# Patient Record
Sex: MALE | Race: WHITE | NOT HISPANIC OR LATINO | Employment: OTHER | URBAN - METROPOLITAN AREA
[De-identification: names, ages, dates, MRNs, and addresses within clinical notes are randomized per-mention and may not be internally consistent; named-entity substitution may affect disease eponyms.]

---

## 2024-08-17 ENCOUNTER — APPOINTMENT (OUTPATIENT)
Dept: RADIOLOGY | Facility: HOSPITAL | Age: 84
End: 2024-08-17
Payer: MEDICARE

## 2024-08-17 ENCOUNTER — CLINICAL SUPPORT (OUTPATIENT)
Dept: EMERGENCY MEDICINE | Facility: HOSPITAL | Age: 84
End: 2024-08-17
Payer: MEDICARE

## 2024-08-17 ENCOUNTER — HOSPITAL ENCOUNTER (OUTPATIENT)
Facility: HOSPITAL | Age: 84
Setting detail: OBSERVATION
End: 2024-08-17
Attending: EMERGENCY MEDICINE | Admitting: INTERNAL MEDICINE
Payer: MEDICARE
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
## 2024-08-17 DIAGNOSIS — S80.01XA TRAUMATIC HEMATOMA OF RIGHT KNEE, INITIAL ENCOUNTER: ICD-10-CM

## 2024-08-17 DIAGNOSIS — F32.0 CURRENT MILD EPISODE OF MAJOR DEPRESSIVE DISORDER WITHOUT PRIOR EPISODE (CMS-HCC): ICD-10-CM

## 2024-08-17 DIAGNOSIS — R41.89 IMPAIRED COGNITIVE ABILITY: ICD-10-CM

## 2024-08-17 DIAGNOSIS — S82.841A ANKLE FRACTURE, BIMALLEOLAR, CLOSED, RIGHT, INITIAL ENCOUNTER: ICD-10-CM

## 2024-08-17 DIAGNOSIS — M25.561 ACUTE PAIN OF RIGHT KNEE: ICD-10-CM
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
## 2024-08-17 LAB
ABO GROUP (TYPE) IN BLOOD: NORMAL
ANION GAP SERPL CALC-SCNC: 20 MMOL/L (ref 10–20)
ANTIBODY SCREEN: NORMAL
APTT PPP: 25 SECONDS (ref 27–38)
ATRIAL RATE: 72 BPM
ATRIAL RATE: 76 BPM
BASOPHILS # BLD AUTO: 0.05 X10*3/UL (ref 0–0.1)
BASOPHILS NFR BLD AUTO: 0.6 %
BUN SERPL-MCNC: 26 MG/DL (ref 6–23)
CALCIUM SERPL-MCNC: 9.8 MG/DL (ref 8.6–10.6)
CARDIAC TROPONIN I PNL SERPL HS: 7 NG/L (ref 0–53)
CHLORIDE SERPL-SCNC: 103 MMOL/L (ref 98–107)
CO2 SERPL-SCNC: 20 MMOL/L (ref 21–32)
CREAT SERPL-MCNC: 1.73 MG/DL (ref 0.5–1.3)
EGFRCR SERPLBLD CKD-EPI 2021: 38 ML/MIN/1.73M*2
EOSINOPHIL # BLD AUTO: 0.06 X10*3/UL (ref 0–0.4)
EOSINOPHIL NFR BLD AUTO: 0.7 %
ERYTHROCYTE [DISTWIDTH] IN BLOOD BY AUTOMATED COUNT: 12.3 % (ref 11.5–14.5)
GLUCOSE SERPL-MCNC: 110 MG/DL (ref 74–99)
HCT VFR BLD AUTO: 32.2 % (ref 41–52)
HGB BLD-MCNC: 12.3 G/DL (ref 13.5–17.5)
IMM GRANULOCYTES # BLD AUTO: 0.03 X10*3/UL (ref 0–0.5)
IMM GRANULOCYTES NFR BLD AUTO: 0.4 % (ref 0–0.9)
INR PPP: 1 (ref 0.9–1.1)
LYMPHOCYTES # BLD AUTO: 1.14 X10*3/UL (ref 0.8–3)
LYMPHOCYTES NFR BLD AUTO: 13.4 %
MCH RBC QN AUTO: 34.9 PG (ref 26–34)
MCHC RBC AUTO-ENTMCNC: 38.2 G/DL (ref 32–36)
MCV RBC AUTO: 92 FL (ref 80–100)
MONOCYTES # BLD AUTO: 0.59 X10*3/UL (ref 0.05–0.8)
MONOCYTES NFR BLD AUTO: 6.9 %
NEUTROPHILS # BLD AUTO: 6.65 X10*3/UL (ref 1.6–5.5)
NEUTROPHILS NFR BLD AUTO: 78 %
NRBC BLD-RTO: 0 /100 WBCS (ref 0–0)
P AXIS: 64 DEGREES
P AXIS: 77 DEGREES
P OFFSET: 166 MS
P OFFSET: 197 MS
P ONSET: 108 MS
P ONSET: 142 MS
PLATELET # BLD AUTO: 183 X10*3/UL (ref 150–450)
POTASSIUM SERPL-SCNC: 3.9 MMOL/L (ref 3.5–5.3)
PR INTERVAL: 168 MS
PR INTERVAL: 190 MS
PROTHROMBIN TIME: 11.3 SECONDS (ref 9.8–12.8)
Q ONSET: 203 MS
Q ONSET: 226 MS
QRS COUNT: 12 BEATS
QRS COUNT: 13 BEATS
QRS DURATION: 124 MS
QRS DURATION: 130 MS
QT INTERVAL: 418 MS
QT INTERVAL: 464 MS
QTC CALCULATION(BAZETT): 470 MS
QTC CALCULATION(BAZETT): 504 MS
QTC FREDERICIA: 452 MS
QTC FREDERICIA: 491 MS
R AXIS: 66 DEGREES
R AXIS: 77 DEGREES
RBC # BLD AUTO: 3.52 X10*6/UL (ref 4.5–5.9)
RH FACTOR (ANTIGEN D): NORMAL
SODIUM SERPL-SCNC: 139 MMOL/L (ref 136–145)
T AXIS: 59 DEGREES
T AXIS: 65 DEGREES
T OFFSET: 435 MS
T OFFSET: 435 MS
VENTRICULAR RATE: 71 BPM
VENTRICULAR RATE: 76 BPM
WBC # BLD AUTO: 8.5 X10*3/UL (ref 4.4–11.3)

## 2024-08-17 PROCEDURE — 36415 COLL VENOUS BLD VENIPUNCTURE: CPT | Performed by: EMERGENCY MEDICINE

## 2024-08-17 PROCEDURE — 73502 X-RAY EXAM HIP UNI 2-3 VIEWS: CPT | Mod: RIGHT SIDE | Performed by: RADIOLOGY

## 2024-08-17 PROCEDURE — 73700 CT LOWER EXTREMITY W/O DYE: CPT | Mod: RT

## 2024-08-17 PROCEDURE — 93005 ELECTROCARDIOGRAM TRACING: CPT

## 2024-08-17 PROCEDURE — 85025 COMPLETE CBC W/AUTO DIFF WBC: CPT | Performed by: EMERGENCY MEDICINE

## 2024-08-17 PROCEDURE — 73560 X-RAY EXAM OF KNEE 1 OR 2: CPT | Mod: RT

## 2024-08-17 PROCEDURE — 86901 BLOOD TYPING SEROLOGIC RH(D): CPT | Performed by: EMERGENCY MEDICINE

## 2024-08-17 PROCEDURE — 99285 EMERGENCY DEPT VISIT HI MDM: CPT | Mod: 25

## 2024-08-17 PROCEDURE — 73502 X-RAY EXAM HIP UNI 2-3 VIEWS: CPT | Mod: RT

## 2024-08-17 PROCEDURE — 2500000004 HC RX 250 GENERAL PHARMACY W/ HCPCS (ALT 636 FOR OP/ED)

## 2024-08-17 PROCEDURE — 85730 THROMBOPLASTIN TIME PARTIAL: CPT | Performed by: EMERGENCY MEDICINE

## 2024-08-17 PROCEDURE — 85610 PROTHROMBIN TIME: CPT | Performed by: EMERGENCY MEDICINE

## 2024-08-17 PROCEDURE — 73590 X-RAY EXAM OF LOWER LEG: CPT | Mod: RT

## 2024-08-17 PROCEDURE — 70450 CT HEAD/BRAIN W/O DYE: CPT

## 2024-08-17 PROCEDURE — 73700 CT LOWER EXTREMITY W/O DYE: CPT | Mod: RIGHT SIDE | Performed by: RADIOLOGY

## 2024-08-17 PROCEDURE — 72125 CT NECK SPINE W/O DYE: CPT

## 2024-08-17 PROCEDURE — 96374 THER/PROPH/DIAG INJ IV PUSH: CPT

## 2024-08-17 PROCEDURE — 96376 TX/PRO/DX INJ SAME DRUG ADON: CPT

## 2024-08-17 PROCEDURE — 2500000004 HC RX 250 GENERAL PHARMACY W/ HCPCS (ALT 636 FOR OP/ED): Performed by: EMERGENCY MEDICINE

## 2024-08-17 PROCEDURE — 70486 CT MAXILLOFACIAL W/O DYE: CPT

## 2024-08-17 PROCEDURE — 84484 ASSAY OF TROPONIN QUANT: CPT | Performed by: EMERGENCY MEDICINE

## 2024-08-17 PROCEDURE — 99285 EMERGENCY DEPT VISIT HI MDM: CPT | Performed by: EMERGENCY MEDICINE

## 2024-08-17 PROCEDURE — G0378 HOSPITAL OBSERVATION PER HR: HCPCS

## 2024-08-17 PROCEDURE — 80048 BASIC METABOLIC PNL TOTAL CA: CPT | Performed by: EMERGENCY MEDICINE

## 2024-08-17 PROCEDURE — 76377 3D RENDER W/INTRP POSTPROCES: CPT

## 2024-08-17 PROCEDURE — 93010 ELECTROCARDIOGRAM REPORT: CPT | Performed by: EMERGENCY MEDICINE

## 2024-08-17 PROCEDURE — 96361 HYDRATE IV INFUSION ADD-ON: CPT

## 2024-08-17 PROCEDURE — 96375 TX/PRO/DX INJ NEW DRUG ADDON: CPT

## 2024-08-17 PROCEDURE — 73552 X-RAY EXAM OF FEMUR 2/>: CPT | Mod: RT

## 2024-08-17 PROCEDURE — 73552 X-RAY EXAM OF FEMUR 2/>: CPT | Mod: RIGHT SIDE | Performed by: RADIOLOGY

## 2024-08-17 RX ORDER — SENNOSIDES 8.6 MG/1
2 TABLET ORAL 2 TIMES DAILY
Status: DISCONTINUED | OUTPATIENT
Start: 2024-08-17 | End: 2024-08-21 | Stop reason: HOSPADM

## 2024-08-17 RX ORDER — LISINOPRIL 20 MG/1
20 TABLET ORAL DAILY
Status: DISCONTINUED | OUTPATIENT
Start: 2024-08-18 | End: 2024-08-21 | Stop reason: HOSPADM

## 2024-08-17 RX ORDER — ONDANSETRON HYDROCHLORIDE 2 MG/ML
4 INJECTION, SOLUTION INTRAVENOUS ONCE
Status: COMPLETED | OUTPATIENT
Start: 2024-08-17 | End: 2024-08-17

## 2024-08-17 RX ORDER — ACETAMINOPHEN 160 MG/5ML
650 SOLUTION ORAL EVERY 4 HOURS PRN
Status: DISCONTINUED | OUTPATIENT
Start: 2024-08-17 | End: 2024-08-19

## 2024-08-17 RX ORDER — DONEPEZIL HYDROCHLORIDE 10 MG/1
10 TABLET, FILM COATED ORAL DAILY
Status: DISCONTINUED | OUTPATIENT
Start: 2024-08-18 | End: 2024-08-21 | Stop reason: HOSPADM

## 2024-08-17 RX ORDER — FINASTERIDE 5 MG/1
5 TABLET, FILM COATED ORAL DAILY
COMMUNITY

## 2024-08-17 RX ORDER — ONDANSETRON HYDROCHLORIDE 2 MG/ML
INJECTION, SOLUTION INTRAVENOUS
Status: COMPLETED
Start: 2024-08-17 | End: 2024-08-17

## 2024-08-17 RX ORDER — MIRTAZAPINE 15 MG/1
7.5 TABLET, FILM COATED ORAL
COMMUNITY
Start: 2024-06-14 | End: 2024-08-21 | Stop reason: HOSPADM

## 2024-08-17 RX ORDER — ESCITALOPRAM OXALATE 10 MG/1
10 TABLET ORAL
COMMUNITY
Start: 2024-05-14 | End: 2024-08-18 | Stop reason: WASHOUT

## 2024-08-17 RX ORDER — LIDOCAINE 560 MG/1
1 PATCH PERCUTANEOUS; TOPICAL; TRANSDERMAL DAILY
Status: DISCONTINUED | OUTPATIENT
Start: 2024-08-18 | End: 2024-08-21 | Stop reason: HOSPADM

## 2024-08-17 RX ORDER — PRAVASTATIN SODIUM 10 MG/1
1 TABLET ORAL
COMMUNITY
Start: 2012-12-24

## 2024-08-17 RX ORDER — DONEPEZIL HYDROCHLORIDE 10 MG/1
10 TABLET, FILM COATED ORAL DAILY
COMMUNITY

## 2024-08-17 RX ORDER — MEMANTINE HYDROCHLORIDE 10 MG/1
10 TABLET ORAL 2 TIMES DAILY
COMMUNITY
Start: 2024-05-16 | End: 2024-08-21 | Stop reason: HOSPADM

## 2024-08-17 RX ORDER — ESCITALOPRAM OXALATE 20 MG/1
10 TABLET ORAL DAILY
Status: DISCONTINUED | OUTPATIENT
Start: 2024-08-18 | End: 2024-08-21 | Stop reason: HOSPADM

## 2024-08-17 RX ORDER — PRAVASTATIN SODIUM 20 MG/1
10 TABLET ORAL
Status: DISCONTINUED | OUTPATIENT
Start: 2024-08-19 | End: 2024-08-21 | Stop reason: HOSPADM

## 2024-08-17 RX ORDER — QUETIAPINE FUMARATE 25 MG/1
25 TABLET, FILM COATED ORAL NIGHTLY
COMMUNITY
Start: 2024-06-09 | End: 2024-08-18 | Stop reason: WASHOUT

## 2024-08-17 RX ORDER — ACETAMINOPHEN 650 MG/1
650 SUPPOSITORY RECTAL EVERY 4 HOURS PRN
Status: DISCONTINUED | OUTPATIENT
Start: 2024-08-17 | End: 2024-08-19

## 2024-08-17 RX ORDER — HYDROMORPHONE HYDROCHLORIDE 1 MG/ML
0.2 INJECTION, SOLUTION INTRAMUSCULAR; INTRAVENOUS; SUBCUTANEOUS ONCE
Status: COMPLETED | OUTPATIENT
Start: 2024-08-17 | End: 2024-08-17

## 2024-08-17 RX ORDER — ACETAMINOPHEN 325 MG/1
650 TABLET ORAL EVERY 4 HOURS PRN
Status: DISCONTINUED | OUTPATIENT
Start: 2024-08-17 | End: 2024-08-19

## 2024-08-17 RX ORDER — LISINOPRIL 20 MG/1
20 TABLET ORAL DAILY
COMMUNITY

## 2024-08-17 RX ORDER — FINASTERIDE 5 MG/1
5 TABLET, FILM COATED ORAL DAILY
Status: DISCONTINUED | OUTPATIENT
Start: 2024-08-18 | End: 2024-08-21 | Stop reason: HOSPADM

## 2024-08-17 RX ORDER — MEMANTINE HYDROCHLORIDE 10 MG/1
10 TABLET ORAL DAILY
Status: DISCONTINUED | OUTPATIENT
Start: 2024-08-18 | End: 2024-08-21 | Stop reason: HOSPADM

## 2024-08-17 RX ORDER — MIRTAZAPINE 15 MG/1
15 TABLET, FILM COATED ORAL NIGHTLY
Status: DISCONTINUED | OUTPATIENT
Start: 2024-08-17 | End: 2024-08-21 | Stop reason: HOSPADM

## 2024-08-17 RX ORDER — OXYCODONE HYDROCHLORIDE 5 MG/1
5 TABLET ORAL EVERY 6 HOURS PRN
Status: DISCONTINUED | OUTPATIENT
Start: 2024-08-17 | End: 2024-08-21 | Stop reason: HOSPADM

## 2024-08-17 RX ADMIN — ONDANSETRON 4 MG: 2 INJECTION INTRAMUSCULAR; INTRAVENOUS at 19:26

## 2024-08-17 RX ADMIN — ONDANSETRON HYDROCHLORIDE 4 MG: 2 INJECTION, SOLUTION INTRAVENOUS at 18:25

## 2024-08-17 RX ADMIN — HYDROMORPHONE HYDROCHLORIDE 0.2 MG: 1 INJECTION, SOLUTION INTRAMUSCULAR; INTRAVENOUS; SUBCUTANEOUS at 18:04

## 2024-08-17 RX ADMIN — SODIUM CHLORIDE, POTASSIUM CHLORIDE, SODIUM LACTATE AND CALCIUM CHLORIDE 1000 ML: 600; 310; 30; 20 INJECTION, SOLUTION INTRAVENOUS at 20:45

## 2024-08-17 RX ADMIN — ONDANSETRON 4 MG: 2 INJECTION INTRAMUSCULAR; INTRAVENOUS at 18:25

## 2024-08-17 ASSESSMENT — PAIN - FUNCTIONAL ASSESSMENT: PAIN_FUNCTIONAL_ASSESSMENT: 0-10

## 2024-08-17 ASSESSMENT — COLUMBIA-SUICIDE SEVERITY RATING SCALE - C-SSRS
6. HAVE YOU EVER DONE ANYTHING, STARTED TO DO ANYTHING, OR PREPARED TO DO ANYTHING TO END YOUR LIFE?: NO
1. IN THE PAST MONTH, HAVE YOU WISHED YOU WERE DEAD OR WISHED YOU COULD GO TO SLEEP AND NOT WAKE UP?: NO
2. HAVE YOU ACTUALLY HAD ANY THOUGHTS OF KILLING YOURSELF?: NO

## 2024-08-17 ASSESSMENT — PAIN DESCRIPTION - FREQUENCY: FREQUENCY: CONSTANT/CONTINUOUS

## 2024-08-17 ASSESSMENT — PAIN DESCRIPTION - LOCATION: LOCATION: HIP

## 2024-08-17 ASSESSMENT — PAIN DESCRIPTION - PAIN TYPE: TYPE: ACUTE PAIN

## 2024-08-17 ASSESSMENT — PAIN SCALES - GENERAL: PAINLEVEL_OUTOF10: 10 - WORST POSSIBLE PAIN

## 2024-08-17 NOTE — ED PROVIDER NOTES
Limitations to History: Patient has dementia    HPI: 84-year-old male with history of hyperlipidemia hypertension and dementia alert and oriented x 2 at baseline presents after fall.  Patient was at a house showing and missed a step in the basement falling onto his right knee and hip and also striking his chin.  This was unwitnessed but patient was able to call out immediately to his spouse who came down.  Patient denies any loss of consciousness.  Patient is not anticoagulated aside from a baby aspirin.  Patient originally was able to ambulate on the scene and then developed impressive swelling to his right knee and increasing pain prompting his ED evaluation.    Additional History Obtained from: Spouse of 56 years    ------------------------------------------------------------------------------------------------------------------------------------------  Physical Exam:    VS: As documented in the triage note and EMR flowsheet from this visit were reviewed.  General: Patient appears uncomfortable saying his right knee hurts.  Patient hemodynamically stable and afebrile.  Eyes: Pupils round and reactive. No scleral icterus.   HENT: Patient with mild swelling to the right mandible in the mental area.  Patient with no malocclusion.  Normocephalic. Moist mucous membranes.  No cervical C-spine tenderness.  CV: Regular rate. No pedal edema appreciated.  Resp: Clear to auscultation bilaterally. Non-labored.    GI: Soft, nontender to palpation. Nondistended.   MSK: Patient with impressive swelling of about a grapefruit sized to the medial aspect of his right knee with blistering of the skin.  He has a intact popliteal pulse and DP pulse.  Patient with no shortening or malrotation of the femur.  Patient's pelvis is stable.  Patient with pain on palpation of the distal femur  Skin: Warm, dry, intact. No systemic rashes or lesions appreciated.  Neuro: Patient alert and oriented x 2.   Has extraocular motions intact is able to  elevate his palate without difficulty.  Has intact sensation to light touch over all regions of his face. Patient able to move all extremities without difficulty.  Patient cannot ambulate due to pain fluent. Answers questions appropriately.   Psych: Appropriate. Kempt.    ------------------------------------------------------------------------------------------------------------------------------------------    Medical Decision Making 84-year-old male presents after fall from 1 step onto his right knee but also with stigmata of facial trauma.  Patient is not anticoagulated.  Patient with impressive swelling and pain on palpation of his distal femur.  Portable x-ray shows possible fracture of the distal femur over the medial aspect of the knee though it is difficult for me to appreciate this versus chronic osteoarthritic changes.  Given the fall and this amount of trauma we will CT his brain C-spine face and that right knee.  Patient to require hydromorphone for pain control.  Despite the swelling patient has good peripheral pulses so I have low concern for an arterial injury or a dislocation that was relocated with associated arterial injury    9:07 PM  ED Course as of 08/17/24 2107   Sat Aug 17, 2024   2030 Basic metabolic panel(!) [CM]   2030 CBC and Auto Differential(!) [CM]   2030 CBC with no significant leukocytosis so hemoglobin is 12.3.  On care everywhere labs from [facility] most recent hemoglobin was in the 13's.  Patient with a creatinine of 1.73 most recent at [facility] was 1.5.  CT scans of the brain cervical spine and face are without any traumatic injury.  X-rays of the pelvis right femur right knee and right tib-fib are without any traumatic injury aside from significant soft tissue swelling medial to the right knee.  CT of the knee is pending radiology read at this time.  Patient's pain much improved with 0.2 mg hydromorphone [CM]      ED Course User Index  [CM] Bobby G Deion, MD         Diagnoses as of  08/17/24 2107   Fall, initial encounter   Acute pain of right knee   Traumatic hematoma of right knee, initial encounter     Patient with CT of the knee does not show any evidence of fracture but does show a large hematoma.  Patient is unable to ambulate given the size of the hematoma and pain over that site.  The rest of patient's imaging is negative for any traumatic findings.  Patient to be admitted to medicine for pain control PT OT.  We discussed possible discharge home with outpatient PMD follow-up and patient and his spouse were not comfortable with this given his recurrent falls.    EKG interpreted by myself (ED attending physician) normal sinus rhythm, right bundle branch block.  QTc 470.  No ST or T wave changes consistent with occlusion MI.  Normal axis.  Right bundle branch block is new compared to previous from 2012    External Records Reviewed: I reviewed recent and relevant outside records including: Outpatient labs done at [facility]    Diagnostic testing considered: CT imaging of the head neck and knee.  X-ray imaging of the hip femur knee and tib-fib    Independent Interpretation of Studies:  I independently interpreted CT of the head which shows no obvious traumatic hemorrhage, x-ray of the pelvis that shows no evidence of pelvic or hip fracture, CT of the neck that showed no malalignment of the cervical spine.  CT of the face that showed no evidence of fracture in the submental region of the mandible    Escalation of Care:  Appropriate for medicine admission for PT OT given recent falls and inability to ambulate given large hematoma to the right medial knee    Social Determinants Affecting Care: None    Prescription Drug Consideration: None    Discussion of Management with Other Providers:   I discussed the patient/results with: Admitting team     Bobby Albarran MD  Emergency Medicine \     Bobby Albarran MD  08/18/24 0420

## 2024-08-17 NOTE — ED TRIAGE NOTES
Pt presents with a fall today down 1 step, was unsure where the step ended covered in a rug, pt hit right knee with large hematoma and hip pain and ecchymosis to chin. Pt got up and walked to car and went home and decided he was in too much pain.   Pt did have another fall a week ago     History of neurogenic bladder and self  caths self at home, lives with  and he mentions pt does have some  slight dementia.

## 2024-08-18 ENCOUNTER — APPOINTMENT (OUTPATIENT)
Dept: RADIOLOGY | Facility: HOSPITAL | Age: 84
End: 2024-08-18
Payer: MEDICARE

## 2024-08-18 VITALS
DIASTOLIC BLOOD PRESSURE: 67 MMHG | SYSTOLIC BLOOD PRESSURE: 140 MMHG | BODY MASS INDEX: 26.66 KG/M2 | HEIGHT: 69 IN | TEMPERATURE: 98.4 F | WEIGHT: 180 LBS | RESPIRATION RATE: 19 BRPM | OXYGEN SATURATION: 97 % | HEART RATE: 87 BPM

## 2024-08-18 PROBLEM — S80.01XA TRAUMATIC HEMATOMA OF RIGHT KNEE: Status: ACTIVE | Noted: 2024-08-17

## 2024-08-18 LAB
ABO GROUP (TYPE) IN BLOOD: NORMAL
ALBUMIN SERPL BCP-MCNC: 4.2 G/DL (ref 3.4–5)
ANION GAP SERPL CALC-SCNC: 21 MMOL/L (ref 10–20)
APPEARANCE UR: ABNORMAL
BASOPHILS # BLD AUTO: 0.04 X10*3/UL (ref 0–0.1)
BASOPHILS NFR BLD AUTO: 0.5 %
BILIRUB UR STRIP.AUTO-MCNC: NEGATIVE MG/DL
BUN SERPL-MCNC: 29 MG/DL (ref 6–23)
CALCIUM SERPL-MCNC: 9.6 MG/DL (ref 8.6–10.6)
CHLORIDE SERPL-SCNC: 105 MMOL/L (ref 98–107)
CHLORIDE UR-SCNC: 47 MMOL/L
CHLORIDE/CREATININE (MMOL/G) IN URINE: 20 MMOL/G CREAT (ref 23–275)
CO2 SERPL-SCNC: 20 MMOL/L (ref 21–32)
COLOR UR: YELLOW
CREAT SERPL-MCNC: 2.06 MG/DL (ref 0.5–1.3)
CREAT UR-MCNC: 238.6 MG/DL (ref 20–370)
EGFRCR SERPLBLD CKD-EPI 2021: 31 ML/MIN/1.73M*2
EOSINOPHIL # BLD AUTO: 0.02 X10*3/UL (ref 0–0.4)
EOSINOPHIL NFR BLD AUTO: 0.2 %
ERYTHROCYTE [DISTWIDTH] IN BLOOD BY AUTOMATED COUNT: 12 % (ref 11.5–14.5)
GLUCOSE SERPL-MCNC: 115 MG/DL (ref 74–99)
GLUCOSE UR STRIP.AUTO-MCNC: NORMAL MG/DL
HCT VFR BLD AUTO: 30 % (ref 41–52)
HGB BLD-MCNC: 11.8 G/DL (ref 13.5–17.5)
IMM GRANULOCYTES # BLD AUTO: 0.02 X10*3/UL (ref 0–0.5)
IMM GRANULOCYTES NFR BLD AUTO: 0.2 % (ref 0–0.9)
KETONES UR STRIP.AUTO-MCNC: ABNORMAL MG/DL
LEUKOCYTE ESTERASE UR QL STRIP.AUTO: ABNORMAL
LYMPHOCYTES # BLD AUTO: 0.77 X10*3/UL (ref 0.8–3)
LYMPHOCYTES NFR BLD AUTO: 9.3 %
MAGNESIUM SERPL-MCNC: 1.78 MG/DL (ref 1.6–2.4)
MCH RBC QN AUTO: 35.2 PG (ref 26–34)
MCHC RBC AUTO-ENTMCNC: 39.3 G/DL (ref 32–36)
MCV RBC AUTO: 90 FL (ref 80–100)
MONOCYTES # BLD AUTO: 0.68 X10*3/UL (ref 0.05–0.8)
MONOCYTES NFR BLD AUTO: 8.2 %
MUCOUS THREADS #/AREA URNS AUTO: ABNORMAL /LPF
NEUTROPHILS # BLD AUTO: 6.72 X10*3/UL (ref 1.6–5.5)
NEUTROPHILS NFR BLD AUTO: 81.6 %
NITRITE UR QL STRIP.AUTO: NEGATIVE
NRBC BLD-RTO: 0 /100 WBCS (ref 0–0)
PH UR STRIP.AUTO: 5.5 [PH]
PHOSPHATE SERPL-MCNC: 3.8 MG/DL (ref 2.5–4.9)
PLATELET # BLD AUTO: 150 X10*3/UL (ref 150–450)
POTASSIUM SERPL-SCNC: 4.5 MMOL/L (ref 3.5–5.3)
POTASSIUM UR-SCNC: 77 MMOL/L
POTASSIUM/CREAT UR-RTO: 32 MMOL/G CREAT
PROT UR STRIP.AUTO-MCNC: ABNORMAL MG/DL
RBC # BLD AUTO: 3.35 X10*6/UL (ref 4.5–5.9)
RBC # UR STRIP.AUTO: ABNORMAL /UL
RBC #/AREA URNS AUTO: >20 /HPF
RH FACTOR (ANTIGEN D): NORMAL
SODIUM SERPL-SCNC: 141 MMOL/L (ref 136–145)
SODIUM UR-SCNC: 60 MMOL/L
SODIUM/CREAT UR-RTO: 25 MMOL/G CREAT
SP GR UR STRIP.AUTO: 1.02
UROBILINOGEN UR STRIP.AUTO-MCNC: NORMAL MG/DL
WBC # BLD AUTO: 8.3 X10*3/UL (ref 4.4–11.3)
WBC #/AREA URNS AUTO: ABNORMAL /HPF

## 2024-08-18 PROCEDURE — 2500000001 HC RX 250 WO HCPCS SELF ADMINISTERED DRUGS (ALT 637 FOR MEDICARE OP)

## 2024-08-18 PROCEDURE — 99233 SBSQ HOSP IP/OBS HIGH 50: CPT | Performed by: INTERNAL MEDICINE

## 2024-08-18 PROCEDURE — 82436 ASSAY OF URINE CHLORIDE: CPT

## 2024-08-18 PROCEDURE — 72100 X-RAY EXAM L-S SPINE 2/3 VWS: CPT

## 2024-08-18 PROCEDURE — G0378 HOSPITAL OBSERVATION PER HR: HCPCS

## 2024-08-18 PROCEDURE — 99222 1ST HOSP IP/OBS MODERATE 55: CPT | Performed by: ORTHOPAEDIC SURGERY

## 2024-08-18 PROCEDURE — 83735 ASSAY OF MAGNESIUM: CPT

## 2024-08-18 PROCEDURE — 80069 RENAL FUNCTION PANEL: CPT

## 2024-08-18 PROCEDURE — 81001 URINALYSIS AUTO W/SCOPE: CPT

## 2024-08-18 PROCEDURE — 72100 X-RAY EXAM L-S SPINE 2/3 VWS: CPT | Performed by: RADIOLOGY

## 2024-08-18 PROCEDURE — 71045 X-RAY EXAM CHEST 1 VIEW: CPT

## 2024-08-18 PROCEDURE — 36415 COLL VENOUS BLD VENIPUNCTURE: CPT

## 2024-08-18 PROCEDURE — 85025 COMPLETE CBC W/AUTO DIFF WBC: CPT

## 2024-08-18 RX ORDER — ACETAMINOPHEN 500 MG
2000 TABLET ORAL DAILY
COMMUNITY
End: 2024-08-21 | Stop reason: HOSPADM

## 2024-08-18 RX ORDER — OMEPRAZOLE 10 MG/1
10 CAPSULE, DELAYED RELEASE ORAL DAILY
COMMUNITY

## 2024-08-18 RX ORDER — HYDRALAZINE HYDROCHLORIDE 20 MG/ML
10 INJECTION INTRAMUSCULAR; INTRAVENOUS EVERY 6 HOURS PRN
Status: DISCONTINUED | OUTPATIENT
Start: 2024-08-18 | End: 2024-08-21 | Stop reason: HOSPADM

## 2024-08-18 RX ORDER — ASPIRIN 81 MG/1
81 TABLET ORAL DAILY
COMMUNITY
End: 2024-08-21 | Stop reason: HOSPADM

## 2024-08-18 RX ADMIN — MEMANTINE 10 MG: 10 TABLET ORAL at 10:14

## 2024-08-18 RX ADMIN — SENNOSIDES 17.2 MG: 8.6 TABLET, FILM COATED ORAL at 20:35

## 2024-08-18 RX ADMIN — LISINOPRIL 20 MG: 20 TABLET ORAL at 10:14

## 2024-08-18 RX ADMIN — FINASTERIDE 5 MG: 5 TABLET, FILM COATED ORAL at 10:14

## 2024-08-18 RX ADMIN — ESCITALOPRAM 10 MG: 20 TABLET, FILM COATED ORAL at 10:13

## 2024-08-18 RX ADMIN — MIRTAZAPINE 15 MG: 15 TABLET, FILM COATED ORAL at 20:35

## 2024-08-18 SDOH — SOCIAL STABILITY: SOCIAL INSECURITY: HAS ANYONE EVER THREATENED TO HURT YOUR FAMILY OR YOUR PETS?: NO

## 2024-08-18 SDOH — SOCIAL STABILITY: SOCIAL INSECURITY: ARE THERE ANY APPARENT SIGNS OF INJURIES/BEHAVIORS THAT COULD BE RELATED TO ABUSE/NEGLECT?: NO

## 2024-08-18 SDOH — SOCIAL STABILITY: SOCIAL INSECURITY: DO YOU FEEL ANYONE HAS EXPLOITED OR TAKEN ADVANTAGE OF YOU FINANCIALLY OR OF YOUR PERSONAL PROPERTY?: NO

## 2024-08-18 SDOH — SOCIAL STABILITY: SOCIAL INSECURITY: ARE YOU OR HAVE YOU BEEN THREATENED OR ABUSED PHYSICALLY, EMOTIONALLY, OR SEXUALLY BY ANYONE?: NO

## 2024-08-18 SDOH — SOCIAL STABILITY: SOCIAL INSECURITY: DOES ANYONE TRY TO KEEP YOU FROM HAVING/CONTACTING OTHER FRIENDS OR DOING THINGS OUTSIDE YOUR HOME?: NO

## 2024-08-18 SDOH — SOCIAL STABILITY: SOCIAL INSECURITY: HAVE YOU HAD ANY THOUGHTS OF HARMING ANYONE ELSE?: NO

## 2024-08-18 SDOH — SOCIAL STABILITY: SOCIAL INSECURITY: HAVE YOU HAD THOUGHTS OF HARMING ANYONE ELSE?: NO

## 2024-08-18 SDOH — SOCIAL STABILITY: SOCIAL INSECURITY: WERE YOU ABLE TO COMPLETE ALL THE BEHAVIORAL HEALTH SCREENINGS?: YES

## 2024-08-18 SDOH — SOCIAL STABILITY: SOCIAL INSECURITY: ABUSE: ADULT

## 2024-08-18 SDOH — SOCIAL STABILITY: SOCIAL INSECURITY: DO YOU FEEL UNSAFE GOING BACK TO THE PLACE WHERE YOU ARE LIVING?: NO

## 2024-08-18 ASSESSMENT — COGNITIVE AND FUNCTIONAL STATUS - GENERAL
CLIMB 3 TO 5 STEPS WITH RAILING: TOTAL
MOBILITY SCORE: 18
STANDING UP FROM CHAIR USING ARMS: A LITTLE
MOVING TO AND FROM BED TO CHAIR: A LITTLE
PATIENT BASELINE BEDBOUND: NO
DAILY ACTIVITIY SCORE: 24
WALKING IN HOSPITAL ROOM: A LITTLE

## 2024-08-18 ASSESSMENT — LIFESTYLE VARIABLES
EVER HAD A DRINK FIRST THING IN THE MORNING TO STEADY YOUR NERVES TO GET RID OF A HANGOVER: NO
HOW MANY STANDARD DRINKS CONTAINING ALCOHOL DO YOU HAVE ON A TYPICAL DAY: 1 OR 2
EVER FELT BAD OR GUILTY ABOUT YOUR DRINKING: NO
AUDIT-C TOTAL SCORE: 1
TOTAL SCORE: 0
HOW OFTEN DO YOU HAVE A DRINK CONTAINING ALCOHOL: MONTHLY OR LESS
HOW OFTEN DO YOU HAVE 6 OR MORE DRINKS ON ONE OCCASION: NEVER
HAVE PEOPLE ANNOYED YOU BY CRITICIZING YOUR DRINKING: NO
SKIP TO QUESTIONS 9-10: 1
HAVE YOU EVER FELT YOU SHOULD CUT DOWN ON YOUR DRINKING: NO
AUDIT-C TOTAL SCORE: 1

## 2024-08-18 ASSESSMENT — ACTIVITIES OF DAILY LIVING (ADL)
TOILETING: INDEPENDENT
FEEDING YOURSELF: INDEPENDENT
HEARING - LEFT EAR: HEARING AID
GROOMING: INDEPENDENT
HEARING - RIGHT EAR: HEARING AID
WALKS IN HOME: INDEPENDENT
JUDGMENT_ADEQUATE_SAFELY_COMPLETE_DAILY_ACTIVITIES: NO
LACK_OF_TRANSPORTATION: NO
BATHING: INDEPENDENT
ADEQUATE_TO_COMPLETE_ADL: YES
DRESSING YOURSELF: INDEPENDENT
ASSISTIVE_DEVICE: EYEGLASSES;HEARING AID - LEFT
PATIENT'S MEMORY ADEQUATE TO SAFELY COMPLETE DAILY ACTIVITIES?: UNABLE TO ASSESS

## 2024-08-18 ASSESSMENT — PATIENT HEALTH QUESTIONNAIRE - PHQ9
2. FEELING DOWN, DEPRESSED OR HOPELESS: NOT AT ALL
1. LITTLE INTEREST OR PLEASURE IN DOING THINGS: NOT AT ALL
SUM OF ALL RESPONSES TO PHQ9 QUESTIONS 1 & 2: 0

## 2024-08-18 ASSESSMENT — PAIN SCALES - GENERAL: PAINLEVEL_OUTOF10: 0 - NO PAIN

## 2024-08-18 NOTE — CONSULTS
"Orthopedic Surgery Consult Note    History of Present Illness:   Injury: R thigh hematoma    84 M (dementia, HTN, HLD, and BPH) p/a fall down 1 stair. Denies thinners. History obtained from spouse/POA and family in room. Found to have medial thigh hematoma overnight with blister that ruptured. Has been able to ambulate. No fractures on ED workup. No other injuries.    Past Medical History:  As noted in HPI.    Past Surgical History:  Denies prior surgeries to RLE    Social History:   - Denies tobacco use  - Denies alcohol use  - Denies drug use.    Family History:  Non-contributory to patient’s acute orthopaedic injury.    ROS:  Comprehensive review of systems negative except as noted in HPI and exam    Objective     Physical Exam:  - Constitutional: No acute distress, cooperative  - Eyes: EOM grossly intact  - Head/Neck: Trachea midline  - Respiratory/Thorax: Normal work of breathing  - Cardiovascular: RRR on peripheral palpation  - Gastrointestinal: Nondistended  - Psychological: Appropriate mood/behavior  - Skin: Warm and dry. Additional findings in musculoskeletal evaluation  - Musculoskeletal:  RLE:   - Ruptured 5x5cm blister over medial knee with associated surrounding hematoma/ecchymosis  - Tender at site of injury with painful ROM.  - Motor intact in DF/PF/EHL/FHL  - SILT in saph/sural/SPN/DPN distributions  - Foot warm, well perfused  - DP/PT pulse, brisk cap refill  - Compartments soft and compressible  - Stable to valgus/varus stress, Lachman's negative    Last Recorded Vitals  Blood pressure 148/83, pulse 88, temperature 36.9 °C (98.4 °F), resp. rate 18, height 1.753 m (5' 9\"), weight 81.6 kg (180 lb), SpO2 98%.  Intake/Output last 3 Shifts:  I/O last 3 completed shifts:  In: - (0 mL/kg)   Out: 700 (8.6 mL/kg) [Urine:700 (0.2 mL/kg/hr)]  Weight: 81.6 kg     Assessment:  Injury: R thigh hematoma  84 M (dementia, HTN, HLD, and BPH) p/a fall down 1 stair. Denies thinners. Exam w/anteromedial hematoma and " ruptured pressure blister, NVI. Compartments soft. Ligamentous exam stable. XR/CT w/o osseous or intra-articular hemarthrosis. Aspiration attempted w/ 5 cc bloody fluid.    Plan:   - NPO at midnight for upcoming procedure  - Appreciate excellent care per Medicine, please document medical clearance when able  - Please obtain pre-operative labs: T&S, PT/INR, CBC, EKG, CXR - complete  - Consented and posted to OR schedule for R thigh hematoma evacuation w/Dr. Schmitz in AM  - WBAT RLE  - Encourage IS, supplemental O2 as needed  - SCDs only, no chemo ppx in setting of upcoming surgery  - Maintain PIVs    Patient was seen within 30 minutes of consultation.    Arleen Adrian MD  Orthopaedic Surgery  PGY-2  Resident on Call   Available via Mailcloud    This patient will be followed by Ortho Trauma team (All chat preferred):    1st call: Román Bazan PGY-1 or Clint Magallanes PGY-1  2nd call: Kyle Snyder PGY-2  3rd call: Jude Escudero PGY-3    On weekends and after 6PM:  At [facility]: Please reach out to the orthopaedic on-call resident ([phone])  At [facility]: Please reach out to the orthopaedic on-call RIANNA or resident (please refer to Chellya)

## 2024-08-18 NOTE — H&P
"HPI:  Gal Cramer is a 84 y.o. male with a hx of dementia, HTN, HLD, and BPH admitted following mechanical fall resulting in R. Knee hematoma. Patient states that he poorly recalls the circumstances of the fall at this point but denies losing consciousness or head strike.   Per provider notes, patient was at a house showing and missed a step in the basement and fell onto his right knee and hip as well as striking his chin.  This fall was unwitnessed but he had immediate attention from his spouse.  Patient was able to ambulate on the scene but subsequently had impressive swelling and pain that led to his presentation.     Patient denies any history of any other recent falls.  He does not use a walker for ambulation and does not recall feeling unsteady prior to the fall.  Patient denies any dizziness, chest pain, palpitations, syncopal episodes, headache, vision changes, new weakness, numbness, or tingling.  Patient says he is aware of his right knee but pain is currently well-controlled.      ED course  Vitals:   /81 (BP Location: Right arm, Patient Position: Lying)   Pulse 77   Temp 37 °C (98.6 °F) (Temporal)   Resp 19   Ht 1.753 m (5' 9\")   Wt 81.6 kg (180 lb)   SpO2 100%   BMI 26.58 kg/m²    - Labs:   CBC: WBC 8.5 Hgb 12.3  plt 183   BMP: Na 139, K 3.9 Cl 103 HCO3 20 BUN 26 Cr 1.73glu 110   LFT: Ca 9.8    Heme: PT 11.3, INR 1.0 aPTT 25    - Imaging:   ECG 12 lead    Result Date: 8/17/2024  Normal sinus rhythm Right bundle branch block Abnormal ECG When compared with ECG of 13-MAR-2012 08:37, Right bundle branch block is now Present See ED provider note for full interpretation and clinical correlation Confirmed by David Marmolejo ([id number]) on 8/17/2024 10:52:07 PM    ECG 12 lead    Result Date: 8/17/2024  Normal sinus rhythm Nonspecific intraventricular block Abnormal ECG When compared with ECG of 17-AUG-2024 17:31, Nonspecific intraventricular block has replaced Right bundle branch block See ED " provider note for full interpretation and clinical correlation Confirmed by David Marmolejo ([id number]) on 8/17/2024 10:52:02 PM    CT knee right wo IV contrast    Result Date: 8/17/2024  STUDY: CT Right Knee; Completed Time:  8/17/2024 at 7:09 PM INDICATION: Right knee pain and swelling; minimal XR findings. COMPARISON: XR right knee, XR right femur, XR right hip and XR right tibia and fibula 8/17/2024. ACCESSION NUMBER(S): [id number] ORDERING CLINICIAN: SAVANNAH CARLIN TECHNIQUE:  Thin section axial images were obtained through the right knee without intravenous contrast.  Orthogonal reconstructed images were obtained and reviewed.  Automated mA/kV exposure control was utilized and patient examination was performed in strict accordance with principles of ALARA. FINDINGS: There is a 3.6 x 8.9 x 11.2 cm soft tissue mass anteriorly in the subcutaneous fat.  It is heterogeneous in attenuation and is consistent with a hematoma.    No acute osseous abnormalities. Soft tissue mass in the anterior subcutaneous fat consistent with a hematoma. Signed by Jose Alejandro Cross MD    CT head wo IV contrast    Result Date: 8/17/2024  Interpreted By:  Miriam Paz, STUDY: CT HEAD WO IV CONTRAST; CT CERVICAL SPINE WO IV CONTRAST; CT FACIAL BONES WO IV CONTRAST;  8/17/2024 7:07 pm   INDICATION: Signs/Symptoms:fall struck head, no AC; Signs/Symptoms:fall; Signs/Symptoms:fall, right chibn pain.   COMPARISON: None.   ACCESSION NUMBER(S): [id number]; [id number]; [id number]   ORDERING CLINICIAN: SAVANNAH CARLIN   TECHNIQUE: Noncontrast axial CT scan of head was performed, with coronal and sagittal reformats provided. The images were reviewed in bone, brain, blood and soft tissue windows.   Thin cut axial CT images through the facial bones were obtained and reconstructed in the coronal and sagittal plane. 3D reconstruction of the facial bones was created on a dedicated workstation and provided for interpretation.   Axial CT  images of the cervical spine are obtained. Axial, coronal and sagittal reconstructions are provided for review.   FINDINGS: CT HEAD:   No hyperdense intracranial hemorrhage is identified. There is no mass effect or midline shift.   Gray-white differentiation is intact, without evidence of CT apparent transcortical infarct, although patchy attenuation changes are present in the periventricular and subcortical white matter of bilateral cerebral hemispheres, nonspecific findings favored to represent sequela of microvascular disease.   No abnormal ventricular dilatation is present. Basal cisterns are patent. No extra-axial fluid collections are identified.   Scalp soft tissues do not demonstrate any acute abnormality. Calvarium is unremarkable without evidence of skull fracture.   Mastoid air cells and middle ear cavities are clear.   CT FACIAL BONES:   Bony orbits are intact, without evidence of fracture. Periorbital soft tissues and intraorbital structures are symmetric in appearance without evidence of acute trauma. Patient is status post cataract extraction bilaterally.   No acute facial bone fracture is identified. Nasal bones are unremarkable in appearance.   Paranasal sinuses are well aerated without evidence of air-fluid levels.   Some soft tissue swelling overlies the chin, without evidence of underlying osseous injury. No associated fluid collection or soft tissue gas is present.   Although evaluation of the oral cavity is degraded by beam hardening artifact no definite evidence of acute trauma to the oral cavity is present. Temporomandibular joints are intact, with asymmetric degenerative changes present in the right.   Large periapical lucency surrounds the roots of the 2nd right maxillary molar.   Parotid and submandibular glands are symmetric in appearance and otherwise unremarkable.   CT C-SPINE:   There is straightening of normal lordotic curvature of the cervical spine, without evidence of significant  spondylolisthesis.   Cervical vertebral body heights are preserved without evidence of compression fractures, although some insufficiency endplate changes with smaller Schmorl's nodes are present at several levels. There is no evidence of acute trauma to the posterior elements of the cervical spine.   Craniocervical junction is intact, although degenerative changes at the atlantoaxial articulation with hypertrophic pannus extending into the anterior epidural space of C1 contributing to mild spinal canal narrowing at this level with the effacement of the anterior subarachnoid space.   Facet joints are preserved without evidence of traumatic subluxation or perching, although multilevel degenerate facet osteoarthropathy is evident, most pronounced at C2-C3 and C3-C4 on the left.   Multilevel intervertebral disc height loss is present, moderate to severe at C4-C5 and C5-C6 and mild at other levels.   No high-grade stenosis is identified in the cervical spine, although mild spinal canal narrowing is suspected at C3-C4, C4-C5 and C5-C6 due to disc osteophyte complexes and ligamentum flavum thickening.   Mild spinal canal narrowing is present at C3-C4 bilaterally and C4-C5 on the left due to uncovertebral and facet joint hypertrophy.   Prevertebral and paraspinal soft tissues do not demonstrate any acute abnormalities. Included lung apices are clear.       CT HEAD: 1. No evidence of hemorrhage, skull fracture, or other acute intracranial trauma/abnormality. 2. Patchy attenuation changes are present in the periventricular and subcortical white matter of bilateral cerebral hemispheres, nonspecific findings favored to represent sequela of microvascular disease.   CT FACIAL BONES: 1. Soft tissue swelling overlies the chin, likely posttraumatic without absence of the underlying osseous injury. No facial or orbital bone fracture is present. 2. Large periapical lucency surrounds the roots of the 2nd right mandibular molar,  correlate with dental exam.   CT C-SPINE: 1. No evidence of acute trauma to the cervical spine. 2. Multilevel degenerative changes of the cervical spine are present, with intervertebral disc height loss, facet osteoarthropathy, and mild spinal canal and neural foraminal stenosis at several levels due to disc osteophyte complexes, ligamentum flavum thickening and hypertrophic facet changes.   MACRO: None   Signed by: Miriam Paz 8/17/2024 7:28 PM Dictation workstation:   [id number]    CT maxillofacial bones wo IV contrast    Result Date: 8/17/2024  Interpreted By:  Miriam Paz, STUDY: CT HEAD WO IV CONTRAST; CT CERVICAL SPINE WO IV CONTRAST; CT FACIAL BONES WO IV CONTRAST;  8/17/2024 7:07 pm   INDICATION: Signs/Symptoms:fall struck head, no AC; Signs/Symptoms:fall; Signs/Symptoms:fall, right chibn pain.   COMPARISON: None.   ACCESSION NUMBER(S): [id number]; [id number]; [id number]   ORDERING CLINICIAN: SAVANNAH CARLIN   TECHNIQUE: Noncontrast axial CT scan of head was performed, with coronal and sagittal reformats provided. The images were reviewed in bone, brain, blood and soft tissue windows.   Thin cut axial CT images through the facial bones were obtained and reconstructed in the coronal and sagittal plane. 3D reconstruction of the facial bones was created on a dedicated workstation and provided for interpretation.   Axial CT images of the cervical spine are obtained. Axial, coronal and sagittal reconstructions are provided for review.   FINDINGS: CT HEAD:   No hyperdense intracranial hemorrhage is identified. There is no mass effect or midline shift.   Gray-white differentiation is intact, without evidence of CT apparent transcortical infarct, although patchy attenuation changes are present in the periventricular and subcortical white matter of bilateral cerebral hemispheres, nonspecific findings favored to represent sequela of microvascular disease.   No abnormal ventricular dilatation  is present. Basal cisterns are patent. No extra-axial fluid collections are identified.   Scalp soft tissues do not demonstrate any acute abnormality. Calvarium is unremarkable without evidence of skull fracture.   Mastoid air cells and middle ear cavities are clear.   CT FACIAL BONES:   Bony orbits are intact, without evidence of fracture. Periorbital soft tissues and intraorbital structures are symmetric in appearance without evidence of acute trauma. Patient is status post cataract extraction bilaterally.   No acute facial bone fracture is identified. Nasal bones are unremarkable in appearance.   Paranasal sinuses are well aerated without evidence of air-fluid levels.   Some soft tissue swelling overlies the chin, without evidence of underlying osseous injury. No associated fluid collection or soft tissue gas is present.   Although evaluation of the oral cavity is degraded by beam hardening artifact no definite evidence of acute trauma to the oral cavity is present. Temporomandibular joints are intact, with asymmetric degenerative changes present in the right.   Large periapical lucency surrounds the roots of the 2nd right maxillary molar.   Parotid and submandibular glands are symmetric in appearance and otherwise unremarkable.   CT C-SPINE:   There is straightening of normal lordotic curvature of the cervical spine, without evidence of significant spondylolisthesis.   Cervical vertebral body heights are preserved without evidence of compression fractures, although some insufficiency endplate changes with smaller Schmorl's nodes are present at several levels. There is no evidence of acute trauma to the posterior elements of the cervical spine.   Craniocervical junction is intact, although degenerative changes at the atlantoaxial articulation with hypertrophic pannus extending into the anterior epidural space of C1 contributing to mild spinal canal narrowing at this level with the effacement of the anterior  subarachnoid space.   Facet joints are preserved without evidence of traumatic subluxation or perching, although multilevel degenerate facet osteoarthropathy is evident, most pronounced at C2-C3 and C3-C4 on the left.   Multilevel intervertebral disc height loss is present, moderate to severe at C4-C5 and C5-C6 and mild at other levels.   No high-grade stenosis is identified in the cervical spine, although mild spinal canal narrowing is suspected at C3-C4, C4-C5 and C5-C6 due to disc osteophyte complexes and ligamentum flavum thickening.   Mild spinal canal narrowing is present at C3-C4 bilaterally and C4-C5 on the left due to uncovertebral and facet joint hypertrophy.   Prevertebral and paraspinal soft tissues do not demonstrate any acute abnormalities. Included lung apices are clear.       CT HEAD: 1. No evidence of hemorrhage, skull fracture, or other acute intracranial trauma/abnormality. 2. Patchy attenuation changes are present in the periventricular and subcortical white matter of bilateral cerebral hemispheres, nonspecific findings favored to represent sequela of microvascular disease.   CT FACIAL BONES: 1. Soft tissue swelling overlies the chin, likely posttraumatic without absence of the underlying osseous injury. No facial or orbital bone fracture is present. 2. Large periapical lucency surrounds the roots of the 2nd right mandibular molar, correlate with dental exam.   CT C-SPINE: 1. No evidence of acute trauma to the cervical spine. 2. Multilevel degenerative changes of the cervical spine are present, with intervertebral disc height loss, facet osteoarthropathy, and mild spinal canal and neural foraminal stenosis at several levels due to disc osteophyte complexes, ligamentum flavum thickening and hypertrophic facet changes.   MACRO: None   Signed by: Miriam Paz 8/17/2024 7:28 PM Dictation workstation:   [id number]    CT cervical spine wo IV contrast    Result Date: 8/17/2024  Interpreted By:   Miriam Paz, STUDY: CT HEAD WO IV CONTRAST; CT CERVICAL SPINE WO IV CONTRAST; CT FACIAL BONES WO IV CONTRAST;  8/17/2024 7:07 pm   INDICATION: Signs/Symptoms:fall struck head, no AC; Signs/Symptoms:fall; Signs/Symptoms:fall, right chibn pain.   COMPARISON: None.   ACCESSION NUMBER(S): [id number]; [id number]; [id number]   ORDERING CLINICIAN: SAVANNAH CARLIN   TECHNIQUE: Noncontrast axial CT scan of head was performed, with coronal and sagittal reformats provided. The images were reviewed in bone, brain, blood and soft tissue windows.   Thin cut axial CT images through the facial bones were obtained and reconstructed in the coronal and sagittal plane. 3D reconstruction of the facial bones was created on a dedicated workstation and provided for interpretation.   Axial CT images of the cervical spine are obtained. Axial, coronal and sagittal reconstructions are provided for review.   FINDINGS: CT HEAD:   No hyperdense intracranial hemorrhage is identified. There is no mass effect or midline shift.   Gray-white differentiation is intact, without evidence of CT apparent transcortical infarct, although patchy attenuation changes are present in the periventricular and subcortical white matter of bilateral cerebral hemispheres, nonspecific findings favored to represent sequela of microvascular disease.   No abnormal ventricular dilatation is present. Basal cisterns are patent. No extra-axial fluid collections are identified.   Scalp soft tissues do not demonstrate any acute abnormality. Calvarium is unremarkable without evidence of skull fracture.   Mastoid air cells and middle ear cavities are clear.   CT FACIAL BONES:   Bony orbits are intact, without evidence of fracture. Periorbital soft tissues and intraorbital structures are symmetric in appearance without evidence of acute trauma. Patient is status post cataract extraction bilaterally.   No acute facial bone fracture is identified. Nasal bones are  unremarkable in appearance.   Paranasal sinuses are well aerated without evidence of air-fluid levels.   Some soft tissue swelling overlies the chin, without evidence of underlying osseous injury. No associated fluid collection or soft tissue gas is present.   Although evaluation of the oral cavity is degraded by beam hardening artifact no definite evidence of acute trauma to the oral cavity is present. Temporomandibular joints are intact, with asymmetric degenerative changes present in the right.   Large periapical lucency surrounds the roots of the 2nd right maxillary molar.   Parotid and submandibular glands are symmetric in appearance and otherwise unremarkable.   CT C-SPINE:   There is straightening of normal lordotic curvature of the cervical spine, without evidence of significant spondylolisthesis.   Cervical vertebral body heights are preserved without evidence of compression fractures, although some insufficiency endplate changes with smaller Schmorl's nodes are present at several levels. There is no evidence of acute trauma to the posterior elements of the cervical spine.   Craniocervical junction is intact, although degenerative changes at the atlantoaxial articulation with hypertrophic pannus extending into the anterior epidural space of C1 contributing to mild spinal canal narrowing at this level with the effacement of the anterior subarachnoid space.   Facet joints are preserved without evidence of traumatic subluxation or perching, although multilevel degenerate facet osteoarthropathy is evident, most pronounced at C2-C3 and C3-C4 on the left.   Multilevel intervertebral disc height loss is present, moderate to severe at C4-C5 and C5-C6 and mild at other levels.   No high-grade stenosis is identified in the cervical spine, although mild spinal canal narrowing is suspected at C3-C4, C4-C5 and C5-C6 due to disc osteophyte complexes and ligamentum flavum thickening.   Mild spinal canal narrowing is  present at C3-C4 bilaterally and C4-C5 on the left due to uncovertebral and facet joint hypertrophy.   Prevertebral and paraspinal soft tissues do not demonstrate any acute abnormalities. Included lung apices are clear.       CT HEAD: 1. No evidence of hemorrhage, skull fracture, or other acute intracranial trauma/abnormality. 2. Patchy attenuation changes are present in the periventricular and subcortical white matter of bilateral cerebral hemispheres, nonspecific findings favored to represent sequela of microvascular disease.   CT FACIAL BONES: 1. Soft tissue swelling overlies the chin, likely posttraumatic without absence of the underlying osseous injury. No facial or orbital bone fracture is present. 2. Large periapical lucency surrounds the roots of the 2nd right mandibular molar, correlate with dental exam.   CT C-SPINE: 1. No evidence of acute trauma to the cervical spine. 2. Multilevel degenerative changes of the cervical spine are present, with intervertebral disc height loss, facet osteoarthropathy, and mild spinal canal and neural foraminal stenosis at several levels due to disc osteophyte complexes, ligamentum flavum thickening and hypertrophic facet changes.   MACRO: None   Signed by: Miriam Paz 8/17/2024 7:28 PM Dictation workstation:   [id number]    XR hip right with pelvis when performed 2 or 3 views    Result Date: 8/17/2024  STUDY: Femur Radiographs, Pelvis and Right Hip Radiographs;  08/17/2024 6:18 PM INDICATION: Fall/trauma. COMPARISON: None available. ACCESSION NUMBER(S): [id number], [id number] ORDERING CLINICIAN: SAVANNAH CARLIN TECHNIQUE:  Two views (five images) of the right femur.  One view(s) of the pelvis.  Two view(s) of the right hip hip. FINDINGS:  Right Femur: There is no displaced fracture.  The alignment is anatomic.  There is marked soft tissue swelling anteriorly over the knee. PELVIS: The pelvic ring is intact.  There is no acute fracture.  There are degenerative  changes in the included the lower lumbar spine.  The sacroiliac joints are patent.  There is mild narrowing of the left hip joint Right Hip: There is no displaced fracture.  The alignment is anatomic.  There is mild joint space narrowing with acetabular spurring.  Nonspecific soft tissue reticulations.    No acute bony abnormalities on x-ray examination of the pelvis, right femur and right hip. Marked anterior soft tissue swelling over the right knee. Signed by Vonda Sanchez DO    XR femur right 2+ views    Result Date: 8/17/2024  STUDY: Femur Radiographs, Pelvis and Right Hip Radiographs;  08/17/2024 6:18 PM INDICATION: Fall/trauma. COMPARISON: None available. ACCESSION NUMBER(S): [id number], [id number] ORDERING CLINICIAN: SAVANNAH CARLIN TECHNIQUE:  Two views (five images) of the right femur.  One view(s) of the pelvis.  Two view(s) of the right hip hip. FINDINGS:  Right Femur: There is no displaced fracture.  The alignment is anatomic.  There is marked soft tissue swelling anteriorly over the knee. PELVIS: The pelvic ring is intact.  There is no acute fracture.  There are degenerative changes in the included the lower lumbar spine.  The sacroiliac joints are patent.  There is mild narrowing of the left hip joint Right Hip: There is no displaced fracture.  The alignment is anatomic.  There is mild joint space narrowing with acetabular spurring.  Nonspecific soft tissue reticulations.    No acute bony abnormalities on x-ray examination of the pelvis, right femur and right hip. Marked anterior soft tissue swelling over the right knee. Signed by Vonda Sanchez DO    XR tibia fibula right 2 views    Result Date: 8/17/2024  STUDY: Knee Radiographs; 08/17/2024 6:17 PM INDICATION: Fall with right knee and leg pain. COMPARISON: None. ACCESSION NUMBER(S): [id number], [id number] ORDERING CLINICIAN: SAVANNAH CARLIN TECHNIQUE:  Right Knee:  Three view(s) of the right knee. Right Tibia/Fibula:  Five view(s) of the right  tibia and fibula. FINDINGS:  Right Knee:  There is no displaced fracture.  The alignment is anatomic.  No soft tissue abnormality is seen.  There is no joint effusion. There is prominent soft tissue swelling anterior to the patella. Right Tibia/Fibula:  There is no displaced fracture.  The alignment is anatomic.  No soft tissue abnormality is seen.    Prominent soft tissue swelling anterior to the patella. No fracture or dislocation. Signed by Andre Chanel MD    XR knee right 1-2 views    Result Date: 8/17/2024  STUDY: Knee Radiographs; 08/17/2024 6:17 PM INDICATION: Fall with right knee and leg pain. COMPARISON: None. ACCESSION NUMBER(S): [id number], [id number] ORDERING CLINICIAN: SAVANNAH CARLIN TECHNIQUE:  Right Knee:  Three view(s) of the right knee. Right Tibia/Fibula:  Five view(s) of the right tibia and fibula. FINDINGS:  Right Knee:  There is no displaced fracture.  The alignment is anatomic.  No soft tissue abnormality is seen.  There is no joint effusion. There is prominent soft tissue swelling anterior to the patella. Right Tibia/Fibula:  There is no displaced fracture.  The alignment is anatomic.  No soft tissue abnormality is seen.    Prominent soft tissue swelling anterior to the patella. No fracture or dislocation. Signed by Andre Chanel MD   - Interventions 1L LR, zofran, and dilaudid x1     Past medical history:  As above    Surgical history:  No past surgical history on file.    Medications prior to admission:  Prior to Admission medications    Medication Sig Start Date End Date Taking? Authorizing Provider   escitalopram (Lexapro) 10 mg tablet Take 1 tablet (10 mg) by mouth once daily. 5/14/24  Yes Historical Provider, MD   memantine (Namenda) 10 mg tablet Take 1 tablet (10 mg) by mouth twice a day. 5/16/24  Yes Historical Provider, MD   mirtazapine (Remeron) 15 mg tablet Take 0.5 tablets (7.5 mg) by mouth. 6/14/24  Yes Historical Provider, MD   pravastatin (Pravachol) 10 mg tablet Take  1 tablet (10 mg) by mouth once a day on Monday, Wednesday, and Friday. 12  Yes Historical Provider, MD   QUEtiapine (SEROquel) 25 mg tablet Take 1 tablet (25 mg) by mouth once daily at bedtime. 24  Yes Historical Provider, MD   donepezil (Aricept) 10 mg tablet Take 1 tablet (10 mg) by mouth once daily.    Historical Provider, MD   finasteride (Proscar) 5 mg tablet Take 1 tablet (5 mg) by mouth once daily.    Historical Provider, MD   lisinopril 20 mg tablet Take 1 tablet (20 mg) by mouth once daily.    Historical Provider, MD     Medication Documentation Review Audit    **Prior to Admission medications have not yet been reviewed**         Allergies:  NKDA    Family history:  Non-contributory    Social history:  Lives at home with partner. Lives in apartment building, with elevator, not a lot of fall hazards.  Max helps with medications. No other recent falls.   Denies EtOH, illicits, or tobacco use    Review of systems:  12 point ROS otherwise negative      Vitals:    24 Hour Vitals  Temperature:  [37 °C (98.6 °F)] 37 °C (98.6 °F)  Heart Rate:  [77] 77  Respirations:  [19] 19  BP: (164)/(81) 164/81    Temp (24hrs), Av °C (98.6 °F), Min:37 °C (98.6 °F), Max:37 °C (98.6 °F)     24 hour Intake/Output    Intake/Output Summary (Last 24 hours) at 2024 2326  Last data filed at 2024 2045  Gross per 24 hour   Intake --   Output 700 ml   Net -700 ml        Physical exam:  Constitutional: Well-developed male in no acute distress.  HEENT: NCAT. EOMI. Small ecchymose on Right side of chin.   Respiratory: CTAB. No wheezes, crackles, or rhonchi. Normal respiratory effort on RA.  Cardiovascular: RRR, normal S1/S2, No murmurs, rubs, or gallops. + b/l DP pulse  Abdominal: Soft, nondistended, nontender to palpation. No rebound or guarding  Neuro: CN II-XII grossly intact. Moving all extremities with no focal deficits. Sensation and motor intact in b/l LE.   MSK: WWP, RLE edema. R. Knee with large medial  hematoma with overlying thin bubble with wheeping fluid. (Image uploaded to media).Surrounding edema. Small eschar on L. knee  Psych: Appropriate mood and affect.          Medications   Scheduled Medications  [Held by provider] donepezil, 10 mg, oral, Daily  [START ON 8/18/2024] escitalopram, 10 mg, oral, Daily  [START ON 8/18/2024] finasteride, 5 mg, oral, Daily  [START ON 8/18/2024] lisinopril, 20 mg, oral, Daily  [START ON 8/18/2024] memantine, 10 mg, oral, Daily  mirtazapine, 15 mg, oral, Nightly  [START ON 8/19/2024] pravastatin, 10 mg, oral, Every Mon/Wed/Fri  sennosides, 2 tablet, oral, BID     Continuous Medications    PRN Medications         Labs  CBC  Results from last 72 hours   Lab Units 08/17/24  1811   WBC AUTO x10*3/uL 8.5   HEMOGLOBIN g/dL 12.3*   HEMATOCRIT % 32.2*   PLATELETS AUTO x10*3/uL 183        BMP  Results from last 72 hours   Lab Units 08/17/24  1811   SODIUM mmol/L 139   POTASSIUM mmol/L 3.9   CHLORIDE mmol/L 103   BUN mg/dL 26*   CREATININE mg/dL 1.73*       Imaging:  === 08/05/13 ===    US SCROTUM AND TESTICLES    - Impression -  .  Large right-sided spermatocele, stable to slightly progressed.    Tiny left-sided varicocele.    Small right-sided hydrocele.    No testicular mass on either side.   === 08/17/24 ===    CT KNEE RIGHT WO IV CONTRAST    - Impression -  No acute osseous abnormalities.  Soft tissue mass in the anterior subcutaneous fat consistent with a  hematoma.  Signed by Jose Alejandro Cross MD           Assessment and plan:  Gal Cramer is a 84 y.o. male with a hx of dementia, HTN, HLD, and BPH admitted following mechanical fall resulting in R. Knee hematoma. Hg 12.3 and CT/xrays s/f overlying hematoma without fracture. Will continue with symptomatic management, trend CBC, and await PT/OT evaluation for homegoing needs.     #Mechanical Fall  ::CT knee without fracture but with overlying hematoma  ::CT Head, maxillofacial bones and hip/femur xrays without acute abnormality  ::Bl  Hg ~13  -PT/OT ordered   -Pain control with lidocaine patch, tylenol, oxy 5 prn  -Trend CBC  -Will julio c hematoma to assess for expansion    #Dementia  ::Hold donepezil as borderline Qtc (504) in conjunction with lexapro  -Continue mirtazapine, escitalapram, and memantine    #HTN  #HLD  -Continue home statin and lisinopril 20     #BPH  -Continue home finasteride     #VEDA on CKD  ::bl Cr ~1.5, Cr 1.73on admit  -Fup RFP after fluid bolus  -Send Ulytes if Cr does not improve      #Dispo  -PT/OT ordered    F: PRN  E: PRN  N: Regular diet  GI: None indicated  DVT prophylaxis: Held for now iso hematoma  Code status: Full (CONFIRMED ON ADMISSION)  Surrogate decision maker:  Max [phone]       Alejandra Luong MD  PGY-2 Internal Medicine

## 2024-08-18 NOTE — PROGRESS NOTES
Pharmacy Medication History Review    Gal Cramer is a 84 y.o. male admitted for Fall (on) (from) other stairs and steps, initial encounter. Pharmacy reviewed the patient's [id number] medications and allergies for accuracy.    The list below reflects the updated PTA list. Comments regarding how patient may be taking medications differently can be found in the Admit Orders Activity  Prior to Admission Medications   Prescriptions Last Dose Informant Patient Reported? Taking?   aspirin 81 mg EC tablet 8/17/2024 at am Spouse/Significant Other Yes Yes   Sig: Take 1 tablet (81 mg) by mouth once daily.   cholecalciferol (Vitamin D-3) 50 mcg (2,000 unit) capsule 8/17/2024 at am Spouse/Significant Other Yes Yes   Sig: Take 1 capsule (50 mcg) by mouth early in the morning..   donepezil (Aricept) 10 mg tablet 8/17/2024 at am Spouse/Significant Other Yes Yes   Sig: Take 1 tablet (10 mg) by mouth once daily.   finasteride (Proscar) 5 mg tablet 8/17/2024 at am Spouse/Significant Other Yes Yes   Sig: Take 1 tablet (5 mg) by mouth once daily.   lisinopril 20 mg tablet 8/17/2024 at am Spouse/Significant Other Yes Yes   Sig: Take 1 tablet (20 mg) by mouth once daily.   memantine (Namenda) 10 mg tablet 8/17/2024 at am Spouse/Significant Other Yes Yes   Sig: Take 1 tablet (10 mg) by mouth twice a day.   mirtazapine (Remeron) 15 mg tablet 8/16/2024 at pm Spouse/Significant Other Yes Yes   Sig: Take 0.5 tablets (7.5 mg) by mouth.   omeprazole (PriLOSEC) 10 mg DR capsule 8/17/2024 at am Spouse/Significant Other Yes Yes   Sig: Take 1 capsule (10 mg) by mouth once daily. Do not crush or chew.   pravastatin (Pravachol) 10 mg tablet 8/17/2024 at am Spouse/Significant Other Yes Yes   Sig: Take 1 tablet (10 mg) by mouth once a day on Monday, Wednesday, and Friday.      Facility-Administered Medications: None        The list below reflects the updated allergy list. Please review each documented allergy for additional clarification  and justification.  Allergies  Reviewed by Julianna Moss, RN on 8/17/2024   No Known Allergies         Patient declines M2B at discharge. Pharmacy has been updated to [facility] in [facility].    Sources used to complete the med history include   Family Interview - via phone with spouse Navi; lisbet historian, had medication list on hand for interview  Admission MedRec Grid  OARRS - none recent  EPIC medication dispense report    Medications ADDED:  Omeprazole 10mg  Aspirin 81mg  Vitamin D3 2000IU  Medications CHANGED:  None   Medications REMOVED:   Escitalopram 10mg  Quetiapine 25mg    Below are additional concerns with the patient's PTA list.  Patient has recent fills for escitalopram and quetiapine, however spouse states that patient no longer taking these medications    Deysi Rinaldi, PharmD   Transitions of Care Pharmacist  [facility]  Please reach out via Secure Chat for questions, or if no response call [phone] or [facility]

## 2024-08-19 ENCOUNTER — ANESTHESIA EVENT (OUTPATIENT)
Dept: OPERATING ROOM | Facility: HOSPITAL | Age: 84
End: 2024-08-19
Payer: MEDICARE

## 2024-08-19 ENCOUNTER — ANESTHESIA (OUTPATIENT)
Dept: OPERATING ROOM | Facility: HOSPITAL | Age: 84
End: 2024-08-19
Payer: MEDICARE
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
## 2024-08-19 LAB
ALBUMIN SERPL BCP-MCNC: 3.7 G/DL (ref 3.4–5)
ANION GAP SERPL CALC-SCNC: 12 MMOL/L (ref 10–20)
APTT PPP: 32 SECONDS (ref 27–38)
BASOPHILS # BLD AUTO: 0.05 X10*3/UL (ref 0–0.1)
BASOPHILS NFR BLD AUTO: 0.8 %
BUN SERPL-MCNC: 32 MG/DL (ref 6–23)
CALCIUM SERPL-MCNC: 8.9 MG/DL (ref 8.6–10.6)
CHLORIDE SERPL-SCNC: 108 MMOL/L (ref 98–107)
CO2 SERPL-SCNC: 27 MMOL/L (ref 21–32)
CREAT SERPL-MCNC: 1.96 MG/DL (ref 0.5–1.3)
EGFRCR SERPLBLD CKD-EPI 2021: 33 ML/MIN/1.73M*2
EOSINOPHIL # BLD AUTO: 0.11 X10*3/UL (ref 0–0.4)
EOSINOPHIL NFR BLD AUTO: 1.8 %
ERYTHROCYTE [DISTWIDTH] IN BLOOD BY AUTOMATED COUNT: 12.8 % (ref 11.5–14.5)
GLUCOSE SERPL-MCNC: 87 MG/DL (ref 74–99)
HCT VFR BLD AUTO: 27.5 % (ref 41–52)
HGB BLD-MCNC: 10.2 G/DL (ref 13.5–17.5)
IMM GRANULOCYTES # BLD AUTO: 0.03 X10*3/UL (ref 0–0.5)
IMM GRANULOCYTES NFR BLD AUTO: 0.5 % (ref 0–0.9)
INR PPP: 1 (ref 0.9–1.1)
LYMPHOCYTES # BLD AUTO: 1.23 X10*3/UL (ref 0.8–3)
LYMPHOCYTES NFR BLD AUTO: 19.8 %
MAGNESIUM SERPL-MCNC: 2 MG/DL (ref 1.6–2.4)
MCH RBC QN AUTO: 36.8 PG (ref 26–34)
MCHC RBC AUTO-ENTMCNC: 37.1 G/DL (ref 32–36)
MCV RBC AUTO: 99 FL (ref 80–100)
MONOCYTES # BLD AUTO: 0.55 X10*3/UL (ref 0.05–0.8)
MONOCYTES NFR BLD AUTO: 8.8 %
NEUTROPHILS # BLD AUTO: 4.25 X10*3/UL (ref 1.6–5.5)
NEUTROPHILS NFR BLD AUTO: 68.3 %
NRBC BLD-RTO: 0 /100 WBCS (ref 0–0)
PHOSPHATE SERPL-MCNC: 3 MG/DL (ref 2.5–4.9)
PLATELET # BLD AUTO: 162 X10*3/UL (ref 150–450)
POTASSIUM SERPL-SCNC: 4.1 MMOL/L (ref 3.5–5.3)
PROTHROMBIN TIME: 10.9 SECONDS (ref 9.8–12.8)
RBC # BLD AUTO: 2.77 X10*6/UL (ref 4.5–5.9)
SODIUM SERPL-SCNC: 143 MMOL/L (ref 136–145)
WBC # BLD AUTO: 6.2 X10*3/UL (ref 4.4–11.3)

## 2024-08-19 PROCEDURE — 27301 DRAIN THIGH/KNEE LESION: CPT | Performed by: ORTHOPAEDIC SURGERY

## 2024-08-19 PROCEDURE — 2500000001 HC RX 250 WO HCPCS SELF ADMINISTERED DRUGS (ALT 637 FOR MEDICARE OP)

## 2024-08-19 PROCEDURE — 7100000001 HC RECOVERY ROOM TIME - INITIAL BASE CHARGE: Performed by: ORTHOPAEDIC SURGERY

## 2024-08-19 PROCEDURE — 99100 ANES PT EXTEME AGE<1 YR&>70: CPT | Performed by: ANESTHESIOLOGY

## 2024-08-19 PROCEDURE — 99233 SBSQ HOSP IP/OBS HIGH 50: CPT | Performed by: INTERNAL MEDICINE

## 2024-08-19 PROCEDURE — 1100000001 HC PRIVATE ROOM DAILY

## 2024-08-19 PROCEDURE — 85025 COMPLETE CBC W/AUTO DIFF WBC: CPT

## 2024-08-19 PROCEDURE — 2500000004 HC RX 250 GENERAL PHARMACY W/ HCPCS (ALT 636 FOR OP/ED): Performed by: ORTHOPAEDIC SURGERY

## 2024-08-19 PROCEDURE — 80069 RENAL FUNCTION PANEL: CPT

## 2024-08-19 PROCEDURE — 2500000004 HC RX 250 GENERAL PHARMACY W/ HCPCS (ALT 636 FOR OP/ED): Performed by: STUDENT IN AN ORGANIZED HEALTH CARE EDUCATION/TRAINING PROGRAM

## 2024-08-19 PROCEDURE — 3600000008 HC OR TIME - EACH INCREMENTAL 1 MINUTE - PROCEDURE LEVEL THREE: Performed by: ORTHOPAEDIC SURGERY

## 2024-08-19 PROCEDURE — 2500000005 HC RX 250 GENERAL PHARMACY W/O HCPCS: Performed by: STUDENT IN AN ORGANIZED HEALTH CARE EDUCATION/TRAINING PROGRAM

## 2024-08-19 PROCEDURE — 2500000005 HC RX 250 GENERAL PHARMACY W/O HCPCS: Performed by: ORTHOPAEDIC SURGERY

## 2024-08-19 PROCEDURE — 2500000004 HC RX 250 GENERAL PHARMACY W/ HCPCS (ALT 636 FOR OP/ED): Mod: JZ

## 2024-08-19 PROCEDURE — 36415 COLL VENOUS BLD VENIPUNCTURE: CPT

## 2024-08-19 PROCEDURE — 99232 SBSQ HOSP IP/OBS MODERATE 35: CPT

## 2024-08-19 PROCEDURE — 3600000003 HC OR TIME - INITIAL BASE CHARGE - PROCEDURE LEVEL THREE: Performed by: ORTHOPAEDIC SURGERY

## 2024-08-19 PROCEDURE — 3700000001 HC GENERAL ANESTHESIA TIME - INITIAL BASE CHARGE: Performed by: ORTHOPAEDIC SURGERY

## 2024-08-19 PROCEDURE — 0JCN0ZZ EXTIRPATION OF MATTER FROM RIGHT LOWER LEG SUBCUTANEOUS TISSUE AND FASCIA, OPEN APPROACH: ICD-10-PCS | Performed by: OBSTETRICS & GYNECOLOGY

## 2024-08-19 PROCEDURE — 2720000007 HC OR 272 NO HCPCS: Performed by: ORTHOPAEDIC SURGERY

## 2024-08-19 PROCEDURE — 85610 PROTHROMBIN TIME: CPT

## 2024-08-19 PROCEDURE — A27301 PR INCIS/DRAIN THIGH/KNEE ABSCESS,DEEP: Performed by: ANESTHESIOLOGY

## 2024-08-19 PROCEDURE — 83735 ASSAY OF MAGNESIUM: CPT

## 2024-08-19 PROCEDURE — 3700000002 HC GENERAL ANESTHESIA TIME - EACH INCREMENTAL 1 MINUTE: Performed by: ORTHOPAEDIC SURGERY

## 2024-08-19 PROCEDURE — 51701 INSERT BLADDER CATHETER: CPT

## 2024-08-19 PROCEDURE — 7100000002 HC RECOVERY ROOM TIME - EACH INCREMENTAL 1 MINUTE: Performed by: ORTHOPAEDIC SURGERY

## 2024-08-19 RX ORDER — CHOLECALCIFEROL (VITAMIN D3) 25 MCG
2000 TABLET ORAL DAILY
Status: DISCONTINUED | OUTPATIENT
Start: 2024-08-19 | End: 2024-08-21 | Stop reason: HOSPADM

## 2024-08-19 RX ORDER — TRANEXAMIC ACID 100 MG/ML
INJECTION, SOLUTION INTRAVENOUS AS NEEDED
Status: DISCONTINUED | OUTPATIENT
Start: 2024-08-19 | End: 2024-08-19

## 2024-08-19 RX ORDER — LABETALOL HYDROCHLORIDE 5 MG/ML
5 INJECTION, SOLUTION INTRAVENOUS ONCE AS NEEDED
Status: DISCONTINUED | OUTPATIENT
Start: 2024-08-19 | End: 2024-08-19 | Stop reason: HOSPADM

## 2024-08-19 RX ORDER — LIDOCAINE HYDROCHLORIDE 20 MG/ML
INJECTION, SOLUTION INFILTRATION; PERINEURAL AS NEEDED
Status: DISCONTINUED | OUTPATIENT
Start: 2024-08-19 | End: 2024-08-19

## 2024-08-19 RX ORDER — ACETAMINOPHEN 325 MG/1
975 TABLET ORAL EVERY 6 HOURS PRN
Status: DISCONTINUED | OUTPATIENT
Start: 2024-08-19 | End: 2024-08-19

## 2024-08-19 RX ORDER — ALBUTEROL SULFATE 0.83 MG/ML
2.5 SOLUTION RESPIRATORY (INHALATION) ONCE AS NEEDED
Status: DISCONTINUED | OUTPATIENT
Start: 2024-08-19 | End: 2024-08-19 | Stop reason: HOSPADM

## 2024-08-19 RX ORDER — CEFAZOLIN SODIUM 2 G/100ML
2 INJECTION, SOLUTION INTRAVENOUS EVERY 12 HOURS
Status: COMPLETED | OUTPATIENT
Start: 2024-08-19 | End: 2024-08-20

## 2024-08-19 RX ORDER — HYDROMORPHONE HYDROCHLORIDE 1 MG/ML
0.4 INJECTION, SOLUTION INTRAMUSCULAR; INTRAVENOUS; SUBCUTANEOUS EVERY 5 MIN PRN
Status: DISCONTINUED | OUTPATIENT
Start: 2024-08-19 | End: 2024-08-19 | Stop reason: HOSPADM

## 2024-08-19 RX ORDER — PROPOFOL 10 MG/ML
INJECTION, EMULSION INTRAVENOUS AS NEEDED
Status: DISCONTINUED | OUTPATIENT
Start: 2024-08-19 | End: 2024-08-19

## 2024-08-19 RX ORDER — SODIUM CHLORIDE, SODIUM LACTATE, POTASSIUM CHLORIDE, CALCIUM CHLORIDE 600; 310; 30; 20 MG/100ML; MG/100ML; MG/100ML; MG/100ML
100 INJECTION, SOLUTION INTRAVENOUS CONTINUOUS
Status: DISCONTINUED | OUTPATIENT
Start: 2024-08-19 | End: 2024-08-19 | Stop reason: HOSPADM

## 2024-08-19 RX ORDER — ONDANSETRON HYDROCHLORIDE 2 MG/ML
INJECTION, SOLUTION INTRAVENOUS AS NEEDED
Status: DISCONTINUED | OUTPATIENT
Start: 2024-08-19 | End: 2024-08-19

## 2024-08-19 RX ORDER — LIDOCAINE HYDROCHLORIDE 10 MG/ML
0.1 INJECTION INFILTRATION; PERINEURAL ONCE
Status: DISCONTINUED | OUTPATIENT
Start: 2024-08-19 | End: 2024-08-19 | Stop reason: HOSPADM

## 2024-08-19 RX ORDER — DROPERIDOL 2.5 MG/ML
0.62 INJECTION, SOLUTION INTRAMUSCULAR; INTRAVENOUS ONCE AS NEEDED
Status: DISCONTINUED | OUTPATIENT
Start: 2024-08-19 | End: 2024-08-19 | Stop reason: HOSPADM

## 2024-08-19 RX ORDER — HYDROMORPHONE HYDROCHLORIDE 1 MG/ML
0.2 INJECTION, SOLUTION INTRAMUSCULAR; INTRAVENOUS; SUBCUTANEOUS EVERY 5 MIN PRN
Status: DISCONTINUED | OUTPATIENT
Start: 2024-08-19 | End: 2024-08-19 | Stop reason: HOSPADM

## 2024-08-19 RX ORDER — PANTOPRAZOLE SODIUM 20 MG/1
20 TABLET, DELAYED RELEASE ORAL
Status: DISCONTINUED | OUTPATIENT
Start: 2024-08-20 | End: 2024-08-21 | Stop reason: HOSPADM

## 2024-08-19 RX ORDER — ACETAMINOPHEN 325 MG/1
975 TABLET ORAL 3 TIMES DAILY
Status: DISCONTINUED | OUTPATIENT
Start: 2024-08-19 | End: 2024-08-21 | Stop reason: HOSPADM

## 2024-08-19 RX ORDER — MEPERIDINE HYDROCHLORIDE 25 MG/ML
12.5 INJECTION INTRAMUSCULAR; INTRAVENOUS; SUBCUTANEOUS EVERY 10 MIN PRN
Status: DISCONTINUED | OUTPATIENT
Start: 2024-08-19 | End: 2024-08-19 | Stop reason: HOSPADM

## 2024-08-19 RX ORDER — ASPIRIN 81 MG/1
81 TABLET ORAL DAILY
Status: DISCONTINUED | OUTPATIENT
Start: 2024-08-19 | End: 2024-08-19

## 2024-08-19 RX ORDER — FERROUS SULFATE, DRIED 160(50) MG
2 TABLET, EXTENDED RELEASE ORAL DAILY
Qty: 90 TABLET | Refills: 1 | Status: SHIPPED | OUTPATIENT
Start: 2024-08-19 | End: 2024-11-17

## 2024-08-19 RX ORDER — TOBRAMYCIN 1.2 G/30ML
INJECTION, POWDER, LYOPHILIZED, FOR SOLUTION INTRAVENOUS AS NEEDED
Status: DISCONTINUED | OUTPATIENT
Start: 2024-08-19 | End: 2024-08-19 | Stop reason: HOSPADM

## 2024-08-19 RX ORDER — SODIUM CHLORIDE 0.9 G/100ML
IRRIGANT IRRIGATION AS NEEDED
Status: DISCONTINUED | OUTPATIENT
Start: 2024-08-19 | End: 2024-08-19 | Stop reason: HOSPADM

## 2024-08-19 RX ORDER — FENTANYL CITRATE 50 UG/ML
INJECTION, SOLUTION INTRAMUSCULAR; INTRAVENOUS AS NEEDED
Status: DISCONTINUED | OUTPATIENT
Start: 2024-08-19 | End: 2024-08-19

## 2024-08-19 RX ORDER — ESMOLOL HYDROCHLORIDE 10 MG/ML
INJECTION INTRAVENOUS AS NEEDED
Status: DISCONTINUED | OUTPATIENT
Start: 2024-08-19 | End: 2024-08-19

## 2024-08-19 RX ORDER — VANCOMYCIN HYDROCHLORIDE 1 G/20ML
INJECTION, POWDER, LYOPHILIZED, FOR SOLUTION INTRAVENOUS AS NEEDED
Status: DISCONTINUED | OUTPATIENT
Start: 2024-08-19 | End: 2024-08-19 | Stop reason: HOSPADM

## 2024-08-19 RX ORDER — ASPIRIN 81 MG/1
81 TABLET ORAL 2 TIMES DAILY
Status: DISCONTINUED | OUTPATIENT
Start: 2024-08-20 | End: 2024-08-21 | Stop reason: HOSPADM

## 2024-08-19 RX ORDER — DIPHENHYDRAMINE HYDROCHLORIDE 50 MG/ML
12.5 INJECTION INTRAMUSCULAR; INTRAVENOUS ONCE AS NEEDED
Status: DISCONTINUED | OUTPATIENT
Start: 2024-08-19 | End: 2024-08-19 | Stop reason: HOSPADM

## 2024-08-19 RX ORDER — LABETALOL HYDROCHLORIDE 5 MG/ML
INJECTION, SOLUTION INTRAVENOUS AS NEEDED
Status: DISCONTINUED | OUTPATIENT
Start: 2024-08-19 | End: 2024-08-19

## 2024-08-19 RX ORDER — CEFAZOLIN 1 G/1
INJECTION, POWDER, FOR SOLUTION INTRAVENOUS AS NEEDED
Status: DISCONTINUED | OUTPATIENT
Start: 2024-08-19 | End: 2024-08-19

## 2024-08-19 RX ORDER — ACETAMINOPHEN 325 MG/1
975 TABLET ORAL 2 TIMES DAILY
Status: CANCELLED | OUTPATIENT
Start: 2024-08-19

## 2024-08-19 RX ADMIN — Medication 2000 UNITS: at 14:57

## 2024-08-19 RX ADMIN — MEMANTINE 10 MG: 10 TABLET ORAL at 08:20

## 2024-08-19 RX ADMIN — ACETAMINOPHEN 975 MG: 325 TABLET ORAL at 20:39

## 2024-08-19 RX ADMIN — MIRTAZAPINE 15 MG: 15 TABLET, FILM COATED ORAL at 20:39

## 2024-08-19 RX ADMIN — FINASTERIDE 5 MG: 5 TABLET, FILM COATED ORAL at 08:21

## 2024-08-19 RX ADMIN — ASPIRIN 81 MG: 81 TABLET, COATED ORAL at 14:57

## 2024-08-19 RX ADMIN — ESCITALOPRAM 10 MG: 20 TABLET, FILM COATED ORAL at 08:20

## 2024-08-19 RX ADMIN — LISINOPRIL 20 MG: 20 TABLET ORAL at 08:20

## 2024-08-19 RX ADMIN — CEFAZOLIN SODIUM 2 G: 2 INJECTION, SOLUTION INTRAVENOUS at 16:05

## 2024-08-19 RX ADMIN — SODIUM CHLORIDE, POTASSIUM CHLORIDE, SODIUM LACTATE AND CALCIUM CHLORIDE 100 ML/HR: 600; 310; 30; 20 INJECTION, SOLUTION INTRAVENOUS at 10:45

## 2024-08-19 RX ADMIN — SENNOSIDES 17.2 MG: 8.6 TABLET, FILM COATED ORAL at 20:39

## 2024-08-19 RX ADMIN — PRAVASTATIN SODIUM 10 MG: 20 TABLET ORAL at 23:51

## 2024-08-19 RX ADMIN — Medication 6 L/MIN: at 10:45

## 2024-08-19 RX ADMIN — ACETAMINOPHEN 975 MG: 325 TABLET ORAL at 14:57

## 2024-08-19 RX ADMIN — SENNOSIDES 17.2 MG: 8.6 TABLET, FILM COATED ORAL at 08:20

## 2024-08-19 SDOH — HEALTH STABILITY: MENTAL HEALTH: CURRENT SMOKER: 0

## 2024-08-19 ASSESSMENT — COLUMBIA-SUICIDE SEVERITY RATING SCALE - C-SSRS
2. HAVE YOU ACTUALLY HAD ANY THOUGHTS OF KILLING YOURSELF?: NO
1. IN THE PAST MONTH, HAVE YOU WISHED YOU WERE DEAD OR WISHED YOU COULD GO TO SLEEP AND NOT WAKE UP?: NO
6. HAVE YOU EVER DONE ANYTHING, STARTED TO DO ANYTHING, OR PREPARED TO DO ANYTHING TO END YOUR LIFE?: NO

## 2024-08-19 ASSESSMENT — PAIN SCALES - GENERAL
PAINLEVEL_OUTOF10: 3
PAINLEVEL_OUTOF10: 0 - NO PAIN
PAIN_LEVEL: 0
PAINLEVEL_OUTOF10: 3

## 2024-08-19 ASSESSMENT — PAIN - FUNCTIONAL ASSESSMENT
PAIN_FUNCTIONAL_ASSESSMENT: UNABLE TO SELF-REPORT
PAIN_FUNCTIONAL_ASSESSMENT: UNABLE TO SELF-REPORT
PAIN_FUNCTIONAL_ASSESSMENT: 0-10

## 2024-08-19 NOTE — DISCHARGE INSTRUCTIONS
Dear Mr. Cramer,    You were admitted on 8/17/24 overnight after having fallen earlier in the day while going down the stairs during a house tour, and hitting your right knee and right side of your face. You mentioned this was your third fall in the last three weeks. On arrival, you had a large bruise with a collection of blood (hematoma) on the right knee, as well as a small bruise on your chin. You did not complain of any pain at this time. Initial imaging showed no new abnormalities other than soft tissue swelling and fluid collection in the right knee. Orthopedic surgery evaluated your imaging, and your knee on physical exam, and determined you were a good candidate for surgery. You were taken to surgery on 8/19/24 for drainage and removal of blood collection in the right knee. Surgery went well, and you did not complain of excessive pain postoperatively.       FOLLOW UP APPOINTMENTS:  - Please follow up with the orthopedic surgery doctor (Jose Schmitz) within 2 weeks. Referral orders have been placed.   - Please also follow up with your primary care provider within 1-2 weeks.    Please call [phone] to schedule your follow up appointments.     Future Appointments   Date Time Provider Department Center   9/6/2024 11:45 AM Mel Crowell PA-C [id number]       It has been a pleasure taking care of you. We wish you a very speedy recovery!    Your  Internal Medicine Team  [facility]  Department of Internal Medicine      ------------------------------------------------------------------------------------------------------  Orthopaedic Surgery Discharge Instructions:    Follow-Up Instructions  You will need to be seen in clinic by Dr. Jose Schmitz  in 2 weeks for a post-operative evaluation.    You will need to call and schedule an appointment, unless there is a previous appointment that appears on your discharge instructions.  The direct orthopaedic clinic  appointment line phone number is [phone].  Please do not delay in calling to make this appointment.    You should also follow up with your primary care provider in 1-2 weeks.    Activity Restrictions  1) No driving until further instructed by your orthopaedic physician, which will be addressed at your outpatient appointments.    2) No driving or operating heavy machinery while taking narcotic pain medication.    3) Weight bearing status --> weight bearing as tolerated right lower extremity     Discharge Medications  You have been sent home with the following home medications: Oxycodone, Tylenol, Miralax, Zofran, Vitamin D, and Aspirin.  Please wean yourself off the oxycodone, as tolerated. A good time to take the medication is before physical therapy sessions and bedtime. Miralax is a stool softener to reduce the narcotic pain medications cause. Take it one or twice a day while taking narcotic pain medication to ensure you maintain your regular bowel movement frequency. Zofran can be taken for nausea. Please take one Vitamin D tablet daily to aid bone healing. Baby aspirin is taken twice daily to help reduce your risk of blood clots.     You should also take tylenol 650mg every 6 hours as needed to reduce the amount of oxycodone you need for pain.    Wound care instructions:   1) Leave operative dressing in place until postoperative day 7 (8/26/24). Then remove and leave incision open to air. Let water run freely over incision when showering, do not scrub. Do not soak in pool or tub.    2) Call if any drainage after 7 days, increased redness/warmth/swelling at incision site, abnormal pain/tenderness of the extremity, abnormal swelling of the extremity that does not respond to elevation, SOB/chest pain.

## 2024-08-19 NOTE — ANESTHESIA PREPROCEDURE EVALUATION
Patient: Gal Cramer    Procedure Information       Date/Time: 08/19/24 0900    Procedures:       Incision and Drainage Thigh (Right: Thigh - Leg Upper)      Evacuation Hematoma Thigh (Right: Thigh - Leg Upper)    Location: [facility] OR 09 / Virtual [facility] OR    Surgeons: Jose Schmitz MD        Gal Cramer is a 84 y.o. male with a hx of dementia, HTN, HLD, and BPH admitted following mechanical fall resulting in R. Knee hematoma.   Relevant Problems      #Dementia      #HTN  #HLD       #BPH       #VEDA on CKD  -       Chemistry    Lab Results   Component Value Date/Time     08/18/2024 0613    K 4.5 08/18/2024 0613     08/18/2024 0613    CO2 20 (L) 08/18/2024 0613    BUN 29 (H) 08/18/2024 0613    CREATININE 2.06 (H) 08/18/2024 0613    Lab Results   Component Value Date/Time    CALCIUM 9.6 08/18/2024 0613          Lab Results   Component Value Date/Time    WBC 8.3 08/18/2024 0613    HGB 11.8 (L) 08/18/2024 0613    HCT 30.0 (L) 08/18/2024 0613     08/18/2024 0613     Lab Results   Component Value Date/Time    PROTIME 11.3 08/17/2024 1811    INR 1.0 08/17/2024 1811     Encounter Date: 08/17/24   ECG 12 lead   Result Value    Ventricular Rate 71    Atrial Rate 72    KS Interval 190    QRS Duration 130    QT Interval 464    QTC Calculation(Bazett) 504    P Axis 64    R Axis 66    T Axis 59    QRS Count 12    Q Onset 203    P Onset 108    P Offset 166    T Offset 435    QTC Fredericia 491    Narrative    Normal sinus rhythm  Nonspecific intraventricular block  Abnormal ECG  When compared with ECG of 17-AUG-2024 17:31,  Nonspecific intraventricular block has replaced Right bundle branch block  See ED provider note for full interpretation and clinical correlation  Confirmed by David Marmolejo ([id number]) on 8/17/2024 10:52:02 PM     No results found for this or any previous visit from the past 1095 days.   Clinical information reviewed:   Tobacco  Allergies  Meds   Med Hx  Surg Hx    Fam Hx  Soc Hx       ECG 12 lead     Result Date: 8/17/2024  Normal sinus rhythm Right bundle branch block Abnormal ECG When compared with ECG of 13-MAR-2012 08:37, Right bundle branch block is now Present See ED provider note for full interpretation and clinical correlation Confirmed by David Marmolejo ([id number]) on 8/17/2024 10:52:07 PM  NPO Detail:  NPO/Void Status  Carbohydrate Drink Given Prior to Surgery? : N  Date of Last Liquid: 08/18/24  Time of Last Liquid: 2359  Date of Last Solid: 08/18/24  Time of Last Solid: 2359  Last Intake Type: Clear fluids  Time of Last Void: 0500         Physical Exam    Airway  Mallampati: II  TM distance: >3 FB  Neck ROM: full     Cardiovascular    Dental        Pulmonary    Abdominal        Anesthesia Plan    History of general anesthesia?: yes  History of complications of general anesthesia?: yes    ASA 3     general     The patient is not a current smoker.  Patient did not smoke on day of procedure.    intravenous induction   Postoperative administration of opioids is intended.  Trial extubation is planned.  Anesthetic plan and risks discussed with patient and spouse.  Use of blood products discussed with patient and spouse who consented to blood products.    Plan discussed with resident.

## 2024-08-19 NOTE — ANESTHESIA PROCEDURE NOTES
Airway  Date/Time: 8/19/2024 10:12 AM  Urgency: elective    Airway not difficult    Staffing  Performed: resident   Authorized by: Corina Nunn MD    Performed by: Darryn Corona MD  Patient location during procedure: OR    Indications and Patient Condition  Indications for airway management: anesthesia  Spontaneous Ventilation: absent  Sedation level: deep  Preoxygenated: yes  Patient position: sniffing  Mask difficulty assessment: 0 - not attempted  Planned trial extubation    Final Airway Details  Final airway type: supraglottic airway      Successful airway: Supreme  Size 5     Number of attempts at approach: 1

## 2024-08-19 NOTE — SIGNIFICANT EVENT
Patient's revised cardiac risk index is 6.0% with a 30 day risk of MI, death, or cardiac arrest.  From medicine point of view, patient is medically optimized, and chronic conditions controlled. As per all surgeries, there are always inherent risks. Final risks verses benefits of all surgical procedures is based on the surgical and the anesthesia teams' assessments.

## 2024-08-19 NOTE — ADDENDUM NOTE
Addendum  created 08/19/24 1114 by Joslyn Garnett, [facility]    Narcotic reconciliation edited

## 2024-08-19 NOTE — ANESTHESIA POSTPROCEDURE EVALUATION
Patient: Gal Cramer    Procedure Summary       Date: 08/19/24 Room / Location: [facility] OR 09 / Virtual [facility] OR    Anesthesia Start: 1002 Anesthesia Stop: 1051    Procedures:       Incision and Drainage Thigh (Right: Thigh - Leg Upper)      Evacuation Hematoma Thigh (Right: Thigh - Leg Upper) Diagnosis:       Traumatic hematoma of right knee, initial encounter      (Traumatic hematoma of right knee, initial encounter [S80.01XA])    Surgeons: Jose Schmitz MD Responsible Provider: Corina Nunn MD    Anesthesia Type: general ASA Status: 3            Anesthesia Type: general    Vitals Value Taken Time   /89 08/19/24 1051   Temp 36.2 08/19/24 1051   Pulse 67 08/19/24 1051   Resp 12 08/19/24 1051   SpO2 99 08/19/24 1051       Anesthesia Post Evaluation    Patient location during evaluation: PACU  Patient participation: complete - patient cannot participate  Level of consciousness: sedated (Removal of LMA while deep. Airway suctioned, minimal secretions. Airway patent with oral and nasal airways)  Pain score: 0  Pain management: adequate  Airway patency: patent  Cardiovascular status: acceptable  Respiratory status: acceptable  Hydration status: acceptable  Postoperative Nausea and Vomiting: none        No notable events documented.    
Fluid/Electrolyte/Metabolic

## 2024-08-19 NOTE — PROGRESS NOTES
"Physical Therapy                 Therapy Communication Note    Patient Name: Gal Cramer  MRN: [id number]  Today's Date: 8/19/2024     Discipline: Physical Therapy    Missed Visit Reason: Missed Visit Reason: Other (Comment)    Missed Time: Attempt      Comment:  PT eval orders received; per EMR pending \"OR today with Dr Schmitz for hematoma evacuation\". Will follow and re-attempt following OR  "

## 2024-08-19 NOTE — PROGRESS NOTES
"Orthopaedic Surgery Progress Note    PROCEDURE: Right lower extremity hematoma evacuation   DOS: 8/19/24  PRIMARY: Medicine     SUBJECTIVE:  The patient was returned to the PACU in stable condition and evaluated in the immediate postoperative period. Pain well controlled considering recent surgery. Denies chest pain or shortness of breath.     OBJECTIVE:  /75   Pulse 76   Temp 36.2 °C (97.2 °F) (Tympanic)   Resp 16   Ht 1.75 m (5' 8.9\")   Wt 82 kg (180 lb 12.4 oz)   SpO2 100%   BMI 26.78 kg/m²     PHYSICAL EXAM  Gen: NAD  HEENT: normocephalic atraumatic  Psych: appropriate mood and affect  Resp: nonlabored breathing    Cardiac: Extremities WWP    Neuro: Motor and sensory grossly in tact.    Skin: no rashes    MSK:  Right Lower Extremity:   - Compressive ACE, Mepilex in place (remove POD7 8/26/24)   -Tender at incisional sites   -Fires EHL/TA/Gastroc   -SILT in sural, saphenous, superficial/deep peroneal, tibial nerve distributions  -Foot warm, well perfused  -Palpable DP pulse   -Compartments soft and compressible     ASSESSMENT: 4 y.o. male (dementia, HTN, HLD, and BPH) p/a fall down 1 stair with R thigh hematoma. Denies thinners. Exam w/anteromedial hematoma and ruptured pressure blister, NVI. Compartments soft. Ligamentous exam stable. XR/CT w/o osseous or intra-articular hemarthrosis. Aspiration attempted w/ 5 cc bloody fluid. Now s/p RLE hematoma evacuation w/ Dr. Schmitz on 8/19/24.     Injuries:   - RLE traumatic hematoma      PLAN:   - Appreciate excellent care per Medicine   - WB: WBAT RLE    - Abx: Perioperative Ancef 2g q8h x 3 doses   - Diet: Per primary, okay to advance as tolerated from an ortho perspective   - DVT: Per primary, recommend SCDs and ASA 81mg BID   - Dressing(s): Remove Mepliex dressing(s) POD7 (8/26/24)   - Booker: None   - Drain:  Hemovac x 1     Recommended Discharge Medications:   - Oxycodone immediate release tablet 5mg q6h x 7d   - Acetaminophen 650mg q6h x 30d   - Asprin " 81mg BID x 30d   - Calcium carbonate-vitamin D tablet 500mg-200units every day    - Zofran-ODT 4mg q8h x 30d   - Miralax 17g packet every day x 30d        DISPOSITION: Per primary     This patient was seen and staffed with the attending on call, Dr. Jose Schmitz .      Schuyler Becerra MD   Orthopedic Surgery PGY1  [facility]     This patient will be followed by the Orthopaedic Trauma service. Please page or Epic Chat the corresponding residents below with questions or concerns.       Ortho Trauma Service (Epic Chat Preferred)  First call: Schuyler Becerra MD PGY-1  First call: Gabriel Magallanes MD PGY-1  Second call: Jose Mckeon PGY-2  Third call: Román Austin PGY-3    6pm-6am M-F, Holidays, and weekends page Ortho on-call @[phone] with urgent questions/concerns.

## 2024-08-19 NOTE — PROGRESS NOTES
"Orthopaedic Surgery Progress Note    S:  No acute events overnight. Pain well controlled. Denies chest pain, shortness of breath, or fevers.    O:  BP (!) 160/97   Pulse 82   Temp 36.6 °C (97.9 °F)   Resp 17   Ht 1.753 m (5' 9\")   Wt 81.6 kg (180 lb)   SpO2 98%   BMI 26.58 kg/m²     Gen: arousable, NAD, appropriately conversational  Cardiac: extremities warm  Resp: nonlabored on RA  GI: soft, nondistended    MSK:  Right Lower Extremity:   - Ruptured 5x5cm blister over medial knee with associated surrounding hematoma/ecchymosis  - Tender at site of injury with painful ROM.  - Motor intact in DF/PF/EHL/FHL  - SILT in saph/sural/SPN/DPN distributions  - Foot warm, well perfused  - DP/PT pulse, brisk cap refill  - Compartments soft and compressible      A/P: 84 y.o. male (dementia, HTN, HLD, and BPH) p/a fall down 1 stair with R thigh hematoma. Denies thinners. Exam w/anteromedial hematoma and ruptured pressure blister, NVI. Compartments soft. Ligamentous exam stable. XR/CT w/o osseous or intra-articular hemarthrosis. Aspiration attempted w/ 5 cc bloody fluid.    Plan:  - NPO since midnight for upcoming procedure  - Appreciate excellent care per Medicine, please document medical clearance when able  - Please obtain pre-operative labs: T&S, PT/INR, CBC, EKG, CXR - complete  - Consented and posted to OR schedule for R thigh hematoma evacuation w/Dr. Schmitz in AM  - WBAT RLE  - Encourage IS, supplemental O2 as needed  - SCDs only, no chemo ppx in setting of upcoming surgery  - Maintain PIVs     Patient was seen within 30 minutes of consultation.    Dispo: OR today with Dr Schmitz for hematoma evacuation    Jude Escudero MD  PGY-3 Orthopedic Surgery  [facility]  Epic Chat Preferred  Pager: [phone]    While inpatient, this patient will be followed by the Orthopaedic Trauma team. Please see contact information below:      First call: Schuyler Becerra, PGY-1, Clint Magallanes, PGY-2  Second call: Kyle" Lillian, PGY-2   Third call: Jude Escudero, PGY-3    6pm-6am M-F, Holidays, and weekends page Ortho on-call @[phone] with urgent questions/concerns.

## 2024-08-19 NOTE — OP NOTE
Incision and Drainage Thigh (R), Evacuation Hematoma Thigh (R) Operative Note     Date: 2024  OR Location: [facility] OR    Name: Gal Cramer, : 1940, Age: 84 y.o., MRN: [id number], Sex: male    Diagnosis  Pre-op Diagnosis      * Traumatic hematoma of right knee, initial encounter [S80.01XA] Post-op Diagnosis     * Traumatic hematoma of right knee, initial encounter [S80.01XA]     Procedures  Evacuation of hematoma right distal thigh    Surgeons      * Jose Schmitz - Primary    Resident/Fellow/Other Assistant:  Surgeons and Role:     * Zelalem Bullock DO - Resident - Assisting    Procedure Summary  Anesthesia: General  ASA: III  Anesthesia Staff: Anesthesiologist: Cornia Nunn MD  Anesthesia Resident: Darryn Corona MD  Estimated Blood Loss: 5mL  Intra-op Medications:   Administrations occurring from 0900 to 1050 on 24:   Medication Name Total Dose   sodium chloride 0.9 % irrigation solution 3,000 mL   vancomycin (Vancocin) vial for injection 1 g   tobramycin (Nebcin) injection 1,200 mg   lactated Ringer's infusion 8.33 mL   oxygen (O2) therapy 30 L              Anesthesia Record               Intraprocedure I/O Totals          Intake    LR bolus 500.00 mL    Tranexamic Acid 0.00 mL    The total shown is the total volume documented since Anesthesia Start was filed.    Total Intake 500 mL          Specimen: No specimens collected     Staff:   Circulator: Evyenia  Scrub Person: Nasseem         Drains and/or Catheters:   Closed/Suction Drain Right Knee Accordion 10 Fr. (Active)   Dressing Status Clean;Dry 24 1130   Drainage Appearance Dark red 24 1130   Status To bulb suction 24 1130       Tourniquet Times: 0 minutes         Implants: none    Findings: Hematoma in right thigh     Indications: Gal Cramer is an 84 y.o. male who is having surgery for Traumatic hematoma of right knee, initial encounter [S80.01XA].     The patient was seen in the preoperative area.  The risks, benefits, complications, treatment options, non-operative alternatives, expected recovery and outcomes were discussed with the patient. The possibilities of reaction to medication, pulmonary aspiration, injury to surrounding structures, bleeding, recurrent infection, the need for additional procedures, failure to diagnose a condition, and creating a complication requiring transfusion or operation were discussed with the patient. The patient concurred with the proposed plan, giving informed consent.  The site of surgery was properly noted/marked if necessary per policy. The patient has been actively warmed in preoperative area. Preoperative antibiotics have been ordered and given within 1 hours of incision. Venous thrombosis prophylaxis have been ordered including unilateral sequential compression device    Procedure Details: The patient was brought back to the operating room placed on the operating table in supine position.  Timeout was performed verifying the patient by name, medical record number, date of birth, procedure to be performed, surgical site, laterality, and patient allergies.  All in attendance were in agreement.  Anesthesia induced by the anesthesia team.  Patient right lower extremities prepped and draped in the standard orthopedic fashion.    A large hematoma can be felt about the distal medial aspect of the patient's thigh overlying the patient's VMO musculature.  It was subcutaneous in nature after looking at the CT scan with contrast.  We began by making incision lateral to the at risk skin which was in the superior medial portion of the knee proximal and just medial to the patella.  The incision was 5 cm in length.  This was made sharply with a 10 blade.  Using a hemostat I was able to bluntly dissect into the loculation containing the hematoma.  Large volume of gelatinous clotted blood was then expressed from the surgical wound.  3 L of normal saline were then used to irrigate the  site of the hematoma.  Using a finger, we were able to scoop out any remaining clotted blood.  A 10 Polish round Hemovac drain was then placed into the site of the prior hematoma and brought out through the superior medial portion of the knee.  Vancomycin powder was placed in the wound.  We inspected the wound to ensure that  there was no bleeding vessels which would cause a recurrence of the hematoma.  The subcutaneous tissue was closed with 2-0 Vicryl in interrupted fashion.  The skin was closed with 3-0 nylon in mattress fashion.  A compressive soft dressing was placed over the knee.    The patient was awoken and transferred to the [facility] and brought to PACU in stable condition without complication.    Complications:  None; patient tolerated the procedure well.    Disposition: PACU - hemodynamically stable.  Condition: stable         Additional Details: Patient will be weightbearing as tolerated to the right lower extremity.  The Hemovac drain will remain in place.  Output should be monitored every 8 hours and documented in the EMR.  Orthopedic surgery residents will pull and appropriate.  Patient received Ancef x 3 doses.  DVT prophylaxis per hospital protocol.  Patient follow-up with Dr. Schmitz in 2 weeks for postoperative wound check.    Attending Attestation: I was present and scrubbed for the entire procedure.    Jose Schmitz  Phone Number: [phone]

## 2024-08-19 NOTE — HOSPITAL COURSE
Gal Cramer is a 84 y.o. male with a hx of dementia, HTN, HLD, GERD, peripheral neuropathy, and BPH (self-cath 3-4x every day) admitted following unprovoked mechanical fall down 1 step resulting in R Knee hematoma which rapidly increased in size with a hemorrhagic bullae on the anteromedial portion of the R knee, causing lateralization of the patella. He did not endorse much pain over the area, and had some bruising both proximal and distal to the blister. Exam also notable for bruising on his right chin. Imaging in the ED was unremarkable except for soft tissue swelling indicative of hematoma on CT R knee. CT head, CT maxillofacial bone, CT C spine, XR knee, XR tibia fibula, XR femur, and XR hip and pelvis all unremarkable for acute abnormalities. Gait exam notable for some unsteadiness, and patient has had history of 3 falls in the past month. Labs notable for VEDA on CKD with Cr 1.73>2.06 after fluids (baseline 1.5), as well as asymptomatic bacteriuria on urinalysis (leuk esterase 500+; WBC 21-50). Patient straight caths himself at baseline, as was having some minor bleeding when doing this in hospital. Patient evaluated by orthopedic surgery for hematoma management, and taken for I&D of hematoma on 8/19. Postoperatively, patient was doing well and endorsed minimal pain. Anticoagulation was restarted with ASA 81 BID on POD1, and hemovac drain removed on 8/21.    Throughout admission, exam was also notable for dementia, with poor memory and recall of events leading up to the fall. This seemed to be at his baseline. Patient depends on  for help with memory and medical management.    Follow Up:  Remove Mepliex dressing(s) POD7 (8/26/24)   Orthopedic surgery outpatient follow up appointment in 2 weeks (Dr. Jose Schmitz)

## 2024-08-19 NOTE — PROGRESS NOTES
Gal Cramer is a 84 y.o. male on day 0 of admission presenting with Fall (on) (from) other stairs and steps, initial encounter.      Subjective   - NAEON. Surgery today 0900.  - Patient with some difficulty straight cathing himself overnight    Labs:  - Urine electrolytes: Na 60  - Urinalysis: Leuk Esterase 500; Ketones trace; WBC 21-50    Studies:  XR Lumbar spine (8/18):   1. No acute fracture or traumatic subluxation of the lumbar spine.  2. Mild superior endplate deformities from T12-L2 vertebral bodies without bony retropulsion.  3. Multilevel moderate degenerative changes with severe degenerative change at L4-L5.    CT R Knee (8/18):  No acute osseous abnormalities. Soft tissue mass in anterior subcutaneous fat consistent with hematoma    CT Head/Facial Bones/C-Spine (8/18):  CT HEAD:  1. No evidence of hemorrhage, skull fracture, or other acute intracranial trauma/abnormality.  2. Patchy attenuation changes are present in the periventricular and subcortical white matter of bilateral cerebral hemispheres, nonspecific findings favored to represent sequela of microvascular disease.  CT FACIAL BONES:  1. Soft tissue swelling overlies the chin, likely posttraumatic without absence of the underlying osseous injury. No facial or orbital bone fracture is present.  2. Large periapical lucency surrounds the roots of the 2nd right  mandibular molar, correlate with dental exam.    CT C-SPINE:  1. No evidence of acute trauma to the cervical spine.  2. Multilevel degenerative changes of the cervical spine are present, with intervertebral disc height loss, facet osteoarthropathy, and mild spinal canal and neural foraminal stenosis at several levels due to disc osteophyte complexes, ligamentum flavum thickening and hypertrophic facet changes.    XR hip, pelvis, femur, tibia/fibula, knee all unremarkable except for soft tissue swelling of the R anterior knee    Brief Plan:  Surgery today 0900 with ortho  UTI management  postoperatively  Consider Q6 straight cath scheduled  Tylenol 975mg BID scheduled for pain postop       Objective     Last Recorded Vitals  BP (!) 160/97   Pulse 82   Temp 36.6 °C (97.9 °F)   Resp 17   Wt 81.6 kg (180 lb)   SpO2 98%   Intake/Output last 3 Shifts:    Intake/Output Summary (Last 24 hours) at 8/19/2024 0640  Last data filed at 8/18/2024 2200  Gross per 24 hour   Intake 240 ml   Output --   Net 240 ml       Admission Weight  Weight: 81.6 kg (180 lb) (08/17/24 1729)    Daily Weight  08/17/24 : 81.6 kg (180 lb)    Image Results  XR chest 1 view  Narrative: Interpreted By:  Andrew Cope,   STUDY:  XR CHEST 1 VIEW;  8/18/2024 5:57 pm      INDICATION:  Signs/Symptoms:preop eval.      COMPARISON:  None.      ACCESSION NUMBER(S):  [id number]      ORDERING CLINICIAN:  YAEL VALLADARES      FINDINGS:  AP radiograph of the chest was provided.              CARDIOMEDIASTINAL SILHOUETTE:  Cardiomediastinal silhouette is normal in size and configuration.      LUNGS:  Lungs are clear.      ABDOMEN:  No remarkable upper abdominal findings.      BONES:  No acute osseous changes.      Impression: 1.  No evidence of acute cardiopulmonary process.              MACRO:  None      Signed by: Andrew Cope 8/18/2024 7:20 PM  Dictation workstation:   [id number]  XR lumbar spine 2-3 views  Narrative: Interpreted By:  Araceli Wisdom and Kamau Nyokabi   STUDY:  XR LUMBAR SPINE 2-3 VIEWS; ;  8/18/2024 10:22 am      INDICATION:  Signs/Symptoms:left low back pain after fall.      COMPARISON:  Abdominal radiograph 03/13/2012  MRI of the lumbar spine 12/28/2013      ACCESSION NUMBER(S):  [id number]      ORDERING CLINICIAN:  SAVANNAH CARLIN      FINDINGS:  Three views of the lumbar spine.      Mild dextrocurvature of the lumbar spine.      No traumatic subluxation. No acute fractures.      Mild superior endplate concavity of L2 likely representing chronic  compression fracture. Rest of the vertebral body heights  are  maintained. Severe degenerative changes from L3-4 to L5-S1.      Impression: Likely chronic mild L2 vertebral body compression fracture.      Severe degenerative changes from L3-4 to L5-S1.      Mild dextroscoliosis.      I personally reviewed the images/study and I agree with Dr. John Paul Dorman findings as stated. This study was interpreted at [place], [place]      MACRO:  None      Signed by: Araceli Kosnica 8/18/2024 1:43 PM  Dictation workstation:   [id number]      Physical Exam  Constitutional:       General: He is not in acute distress.     Appearance: Normal appearance. He is normal weight.   HENT:      Head: Normocephalic and atraumatic.      Right Ear: External ear normal.      Left Ear: External ear normal.      Nose: Nose normal.      Mouth/Throat:      Mouth: Mucous membranes are moist.   Eyes:      General: No scleral icterus.     Extraocular Movements: Extraocular movements intact.      Conjunctiva/sclera: Conjunctivae normal.      Pupils: Pupils are equal, round, and reactive to light.   Cardiovascular:      Rate and Rhythm: Normal rate and regular rhythm.      Pulses: Normal pulses.      Heart sounds: Normal heart sounds. No murmur heard.     No friction rub. No gallop.   Pulmonary:      Effort: Pulmonary effort is normal. No respiratory distress.      Breath sounds: Normal breath sounds. No wheezing.   Abdominal:      General: Abdomen is flat. Bowel sounds are normal. There is no distension.      Palpations: Abdomen is soft. There is no mass.      Tenderness: There is no abdominal tenderness.   Musculoskeletal:         General: Swelling, tenderness (mild) and signs of injury present. No deformity. Normal range of motion.      Cervical back: Normal range of motion.      Right lower leg: No edema.      Left lower leg: No edema.      Comments: RLE edema. R. Knee with large medial hematoma with hemorrhagic blister on admission s.p rupture. Additional  small skin scraping on R knee below injury. Surrounding bruising both proximally and distally. R leg cooler than left, pulses palpable distally and neurological exam unremarkable.   Skin:     General: Skin is warm and dry.      Capillary Refill: Capillary refill takes less than 2 seconds.   Neurological:      General: No focal deficit present.      Mental Status: He is alert and oriented to person, place, and time. Mental status is at baseline.      Sensory: No sensory deficit.      Gait: Gait abnormal.      Comments: Some unsteadiness on normal gait. Unable to complete tandem walking 2/2 balance issues. Romberg negative.   Psychiatric:         Mood and Affect: Mood normal.         Behavior: Behavior normal.         Thought Content: Thought content normal.         Medications  Scheduled medications  [Held by provider] donepezil, 10 mg, oral, Daily  escitalopram, 10 mg, oral, Daily  finasteride, 5 mg, oral, Daily  lidocaine, 1 patch, transdermal, Daily  lisinopril, 20 mg, oral, Daily  memantine, 10 mg, oral, Daily  mirtazapine, 15 mg, oral, Nightly  pravastatin, 10 mg, oral, Every Mon/Wed/Fri  sennosides, 2 tablet, oral, BID      Continuous medications     PRN medications  PRN medications: acetaminophen **OR** acetaminophen **OR** acetaminophen, hydrALAZINE, oxyCODONE    Relevant Results  Results for orders placed or performed during the hospital encounter of 08/17/24 (from the past 24 hour(s))   Urine electrolytes   Result Value Ref Range    Sodium, Urine Random 60 mmol/L    Sodium/Creatinine Ratio 25 Not established. mmol/g Creat    Potassium, Urine Random 77 mmol/L    Potassium/Creatinine Ratio 32 Not established mmol/g Creat    Chloride, Urine Random 47 mmol/L    Chloride/Creatinine Ratio 20 (L) 23 - 275 mmol/g creat    Creatinine, Urine Random 238.6 20.0 - 370.0 mg/dL   Urinalysis with Reflex Microscopic   Result Value Ref Range    Color, Urine Yellow Light-Yellow, Yellow, Dark-Yellow    Appearance, Urine Turbid  (N) Clear    Specific Gravity, Urine 1.022 1.005 - 1.035    pH, Urine 5.5 5.0, 5.5, 6.0, 6.5, 7.0, 7.5, 8.0    Protein, Urine 50 (1+) (A) NEGATIVE, 10 (TRACE), 20 (TRACE) mg/dL    Glucose, Urine Normal Normal mg/dL    Blood, Urine 0.1 (1+) (A) NEGATIVE    Ketones, Urine TRACE (A) NEGATIVE mg/dL    Bilirubin, Urine NEGATIVE NEGATIVE    Urobilinogen, Urine Normal Normal mg/dL    Nitrite, Urine NEGATIVE NEGATIVE    Leukocyte Esterase, Urine 500 Tushar/µL (A) NEGATIVE   Microscopic Only, Urine   Result Value Ref Range    WBC, Urine 21-50 (A) 1-5, NONE /HPF    RBC, Urine >20 (A) NONE, 1-2, 3-5 /HPF    Mucus, Urine FEW Reference range not established. /LPF   Abo/Rh Group Test   Result Value Ref Range    ABO TYPE A     Rh TYPE NEG      XR lumbar spine 2-3 views    Result Date: 8/18/2024  STUDY: XR LUMBAR SPINE 2-3 VIEWS; ;  8/18/2024 10:22 am   INDICATION: Signs/Symptoms:left low back pain after fall.   COMPARISON: Abdominal radiograph 03/13/2012   ACCESSION NUMBER(S): [id number]   ORDERING CLINICIAN: SAVANNAH CARLIN   FINDINGS: Three views of the lumbar spine.   Mild dextrocurvature of the lumbar spine.   No traumatic subluxation. No acute fractures.   Mild superior endplate deformities from T12-L2 vertebral bodies. No bony retropulsion into the spinal canal.   Moderate multilevel degenerative change disc height loss and endplate osteophytosis. Severe degenerative change of the L4-L5. Mild facet arthropathy at L5-S1.       1. No acute fracture or traumatic subluxation of the lumbar spine. 2. Mild superior endplate deformities from T12-L2 vertebral bodies without bony retropulsion. 3. Multilevel moderate degenerative changes with severe degenerative change at L4-L5.     I personally reviewed the images/study and I agree with Dr. John Paul Dorman findings as stated. This study was interpreted at [place], [place]   MACRO: None     Dictation workstation:   [id number]    ECG 12  lead    Result Date: 8/17/2024  Normal sinus rhythm Right bundle branch block Abnormal ECG When compared with ECG of 13-MAR-2012 08:37, Right bundle branch block is now Present See ED provider note for full interpretation and clinical correlation Confirmed by David Marmolejo ([id number]) on 8/17/2024 10:52:07 PM    ECG 12 lead    Result Date: 8/17/2024  Normal sinus rhythm Nonspecific intraventricular block Abnormal ECG When compared with ECG of 17-AUG-2024 17:31, Nonspecific intraventricular block has replaced Right bundle branch block See ED provider note for full interpretation and clinical correlation Confirmed by David Marmolejo ([id number]) on 8/17/2024 10:52:02 PM    CT knee right wo IV contrast    Result Date: 8/17/2024  STUDY: CT Right Knee; Completed Time:  8/17/2024 at 7:09 PM INDICATION: Right knee pain and swelling; minimal XR findings. COMPARISON: XR right knee, XR right femur, XR right hip and XR right tibia and fibula 8/17/2024. ACCESSION NUMBER(S): [id number] ORDERING CLINICIAN: SAVANNAH CARLIN TECHNIQUE:  Thin section axial images were obtained through the right knee without intravenous contrast.  Orthogonal reconstructed images were obtained and reviewed.  Automated mA/kV exposure control was utilized and patient examination was performed in strict accordance with principles of ALARA. FINDINGS: There is a 3.6 x 8.9 x 11.2 cm soft tissue mass anteriorly in the subcutaneous fat.  It is heterogeneous in attenuation and is consistent with a hematoma.    No acute osseous abnormalities. Soft tissue mass in the anterior subcutaneous fat consistent with a hematoma. Signed by Jose Alejandro Cross MD    CT head wo IV contrast    Result Date: 8/17/2024  Interpreted By:  Miriam Paz, STUDY: CT HEAD WO IV CONTRAST; CT CERVICAL SPINE WO IV CONTRAST; CT FACIAL BONES WO IV CONTRAST;  8/17/2024 7:07 pm   INDICATION: Signs/Symptoms:fall struck head, no AC; Signs/Symptoms:fall; Signs/Symptoms:fall, right chibn pain.    COMPARISON: None.   ACCESSION NUMBER(S): [id number]; [id number]; [id number]   ORDERING CLINICIAN: SAVANNAH CARLIN   TECHNIQUE: Noncontrast axial CT scan of head was performed, with coronal and sagittal reformats provided. The images were reviewed in bone, brain, blood and soft tissue windows.   Thin cut axial CT images through the facial bones were obtained and reconstructed in the coronal and sagittal plane. 3D reconstruction of the facial bones was created on a dedicated workstation and provided for interpretation.   Axial CT images of the cervical spine are obtained. Axial, coronal and sagittal reconstructions are provided for review.   FINDINGS: CT HEAD:   No hyperdense intracranial hemorrhage is identified. There is no mass effect or midline shift.   Gray-white differentiation is intact, without evidence of CT apparent transcortical infarct, although patchy attenuation changes are present in the periventricular and subcortical white matter of bilateral cerebral hemispheres, nonspecific findings favored to represent sequela of microvascular disease.   No abnormal ventricular dilatation is present. Basal cisterns are patent. No extra-axial fluid collections are identified.   Scalp soft tissues do not demonstrate any acute abnormality. Calvarium is unremarkable without evidence of skull fracture.   Mastoid air cells and middle ear cavities are clear.   CT FACIAL BONES:   Bony orbits are intact, without evidence of fracture. Periorbital soft tissues and intraorbital structures are symmetric in appearance without evidence of acute trauma. Patient is status post cataract extraction bilaterally.   No acute facial bone fracture is identified. Nasal bones are unremarkable in appearance.   Paranasal sinuses are well aerated without evidence of air-fluid levels.   Some soft tissue swelling overlies the chin, without evidence of underlying osseous injury. No associated fluid collection or soft tissue gas is present.    Although evaluation of the oral cavity is degraded by beam hardening artifact no definite evidence of acute trauma to the oral cavity is present. Temporomandibular joints are intact, with asymmetric degenerative changes present in the right.   Large periapical lucency surrounds the roots of the 2nd right maxillary molar.   Parotid and submandibular glands are symmetric in appearance and otherwise unremarkable.   CT C-SPINE:   There is straightening of normal lordotic curvature of the cervical spine, without evidence of significant spondylolisthesis.   Cervical vertebral body heights are preserved without evidence of compression fractures, although some insufficiency endplate changes with smaller Schmorl's nodes are present at several levels. There is no evidence of acute trauma to the posterior elements of the cervical spine.   Craniocervical junction is intact, although degenerative changes at the atlantoaxial articulation with hypertrophic pannus extending into the anterior epidural space of C1 contributing to mild spinal canal narrowing at this level with the effacement of the anterior subarachnoid space.   Facet joints are preserved without evidence of traumatic subluxation or perching, although multilevel degenerate facet osteoarthropathy is evident, most pronounced at C2-C3 and C3-C4 on the left.   Multilevel intervertebral disc height loss is present, moderate to severe at C4-C5 and C5-C6 and mild at other levels.   No high-grade stenosis is identified in the cervical spine, although mild spinal canal narrowing is suspected at C3-C4, C4-C5 and C5-C6 due to disc osteophyte complexes and ligamentum flavum thickening.   Mild spinal canal narrowing is present at C3-C4 bilaterally and C4-C5 on the left due to uncovertebral and facet joint hypertrophy.   Prevertebral and paraspinal soft tissues do not demonstrate any acute abnormalities. Included lung apices are clear.       CT HEAD: 1. No evidence of hemorrhage,  skull fracture, or other acute intracranial trauma/abnormality. 2. Patchy attenuation changes are present in the periventricular and subcortical white matter of bilateral cerebral hemispheres, nonspecific findings favored to represent sequela of microvascular disease.   CT FACIAL BONES: 1. Soft tissue swelling overlies the chin, likely posttraumatic without absence of the underlying osseous injury. No facial or orbital bone fracture is present. 2. Large periapical lucency surrounds the roots of the 2nd right mandibular molar, correlate with dental exam.   CT C-SPINE: 1. No evidence of acute trauma to the cervical spine. 2. Multilevel degenerative changes of the cervical spine are present, with intervertebral disc height loss, facet osteoarthropathy, and mild spinal canal and neural foraminal stenosis at several levels due to disc osteophyte complexes, ligamentum flavum thickening and hypertrophic facet changes.   MACRO: None   Signed by: Miriam Paz 8/17/2024 7:28 PM Dictation workstation:   [id number]    CT maxillofacial bones wo IV contrast    Result Date: 8/17/2024  Interpreted By:  Miriam Paz, STUDY: CT HEAD WO IV CONTRAST; CT CERVICAL SPINE WO IV CONTRAST; CT FACIAL BONES WO IV CONTRAST;  8/17/2024 7:07 pm   INDICATION: Signs/Symptoms:fall struck head, no AC; Signs/Symptoms:fall; Signs/Symptoms:fall, right chibn pain.   COMPARISON: None.   ACCESSION NUMBER(S): [id number]; [id number]; [id number]   ORDERING CLINICIAN: SAVANNAH CARLIN   TECHNIQUE: Noncontrast axial CT scan of head was performed, with coronal and sagittal reformats provided. The images were reviewed in bone, brain, blood and soft tissue windows.   Thin cut axial CT images through the facial bones were obtained and reconstructed in the coronal and sagittal plane. 3D reconstruction of the facial bones was created on a dedicated workstation and provided for interpretation.   Axial CT images of the cervical spine are  obtained. Axial, coronal and sagittal reconstructions are provided for review.   FINDINGS: CT HEAD:   No hyperdense intracranial hemorrhage is identified. There is no mass effect or midline shift.   Gray-white differentiation is intact, without evidence of CT apparent transcortical infarct, although patchy attenuation changes are present in the periventricular and subcortical white matter of bilateral cerebral hemispheres, nonspecific findings favored to represent sequela of microvascular disease.   No abnormal ventricular dilatation is present. Basal cisterns are patent. No extra-axial fluid collections are identified.   Scalp soft tissues do not demonstrate any acute abnormality. Calvarium is unremarkable without evidence of skull fracture.   Mastoid air cells and middle ear cavities are clear.   CT FACIAL BONES:   Bony orbits are intact, without evidence of fracture. Periorbital soft tissues and intraorbital structures are symmetric in appearance without evidence of acute trauma. Patient is status post cataract extraction bilaterally.   No acute facial bone fracture is identified. Nasal bones are unremarkable in appearance.   Paranasal sinuses are well aerated without evidence of air-fluid levels.   Some soft tissue swelling overlies the chin, without evidence of underlying osseous injury. No associated fluid collection or soft tissue gas is present.   Although evaluation of the oral cavity is degraded by beam hardening artifact no definite evidence of acute trauma to the oral cavity is present. Temporomandibular joints are intact, with asymmetric degenerative changes present in the right.   Large periapical lucency surrounds the roots of the 2nd right maxillary molar.   Parotid and submandibular glands are symmetric in appearance and otherwise unremarkable.   CT C-SPINE:   There is straightening of normal lordotic curvature of the cervical spine, without evidence of significant spondylolisthesis.   Cervical  vertebral body heights are preserved without evidence of compression fractures, although some insufficiency endplate changes with smaller Schmorl's nodes are present at several levels. There is no evidence of acute trauma to the posterior elements of the cervical spine.   Craniocervical junction is intact, although degenerative changes at the atlantoaxial articulation with hypertrophic pannus extending into the anterior epidural space of C1 contributing to mild spinal canal narrowing at this level with the effacement of the anterior subarachnoid space.   Facet joints are preserved without evidence of traumatic subluxation or perching, although multilevel degenerate facet osteoarthropathy is evident, most pronounced at C2-C3 and C3-C4 on the left.   Multilevel intervertebral disc height loss is present, moderate to severe at C4-C5 and C5-C6 and mild at other levels.   No high-grade stenosis is identified in the cervical spine, although mild spinal canal narrowing is suspected at C3-C4, C4-C5 and C5-C6 due to disc osteophyte complexes and ligamentum flavum thickening.   Mild spinal canal narrowing is present at C3-C4 bilaterally and C4-C5 on the left due to uncovertebral and facet joint hypertrophy.   Prevertebral and paraspinal soft tissues do not demonstrate any acute abnormalities. Included lung apices are clear.       CT HEAD: 1. No evidence of hemorrhage, skull fracture, or other acute intracranial trauma/abnormality. 2. Patchy attenuation changes are present in the periventricular and subcortical white matter of bilateral cerebral hemispheres, nonspecific findings favored to represent sequela of microvascular disease.   CT FACIAL BONES: 1. Soft tissue swelling overlies the chin, likely posttraumatic without absence of the underlying osseous injury. No facial or orbital bone fracture is present. 2. Large periapical lucency surrounds the roots of the 2nd right mandibular molar, correlate with dental exam.   CT  C-SPINE: 1. No evidence of acute trauma to the cervical spine. 2. Multilevel degenerative changes of the cervical spine are present, with intervertebral disc height loss, facet osteoarthropathy, and mild spinal canal and neural foraminal stenosis at several levels due to disc osteophyte complexes, ligamentum flavum thickening and hypertrophic facet changes.   MACRO: None   Signed by: Miriam Paz 8/17/2024 7:28 PM Dictation workstation:   [id number]    CT cervical spine wo IV contrast    Result Date: 8/17/2024  Interpreted By:  Miriam Paz, STUDY: CT HEAD WO IV CONTRAST; CT CERVICAL SPINE WO IV CONTRAST; CT FACIAL BONES WO IV CONTRAST;  8/17/2024 7:07 pm   INDICATION: Signs/Symptoms:fall struck head, no AC; Signs/Symptoms:fall; Signs/Symptoms:fall, right chibn pain.   COMPARISON: None.   ACCESSION NUMBER(S): [id number]; [id number]; [id number]   ORDERING CLINICIAN: SAVANNAH CARLIN   TECHNIQUE: Noncontrast axial CT scan of head was performed, with coronal and sagittal reformats provided. The images were reviewed in bone, brain, blood and soft tissue windows.   Thin cut axial CT images through the facial bones were obtained and reconstructed in the coronal and sagittal plane. 3D reconstruction of the facial bones was created on a dedicated workstation and provided for interpretation.   Axial CT images of the cervical spine are obtained. Axial, coronal and sagittal reconstructions are provided for review.   FINDINGS: CT HEAD:   No hyperdense intracranial hemorrhage is identified. There is no mass effect or midline shift.   Gray-white differentiation is intact, without evidence of CT apparent transcortical infarct, although patchy attenuation changes are present in the periventricular and subcortical white matter of bilateral cerebral hemispheres, nonspecific findings favored to represent sequela of microvascular disease.   No abnormal ventricular dilatation is present. Basal cisterns are  patent. No extra-axial fluid collections are identified.   Scalp soft tissues do not demonstrate any acute abnormality. Calvarium is unremarkable without evidence of skull fracture.   Mastoid air cells and middle ear cavities are clear.   CT FACIAL BONES:   Bony orbits are intact, without evidence of fracture. Periorbital soft tissues and intraorbital structures are symmetric in appearance without evidence of acute trauma. Patient is status post cataract extraction bilaterally.   No acute facial bone fracture is identified. Nasal bones are unremarkable in appearance.   Paranasal sinuses are well aerated without evidence of air-fluid levels.   Some soft tissue swelling overlies the chin, without evidence of underlying osseous injury. No associated fluid collection or soft tissue gas is present.   Although evaluation of the oral cavity is degraded by beam hardening artifact no definite evidence of acute trauma to the oral cavity is present. Temporomandibular joints are intact, with asymmetric degenerative changes present in the right.   Large periapical lucency surrounds the roots of the 2nd right maxillary molar.   Parotid and submandibular glands are symmetric in appearance and otherwise unremarkable.   CT C-SPINE:   There is straightening of normal lordotic curvature of the cervical spine, without evidence of significant spondylolisthesis.   Cervical vertebral body heights are preserved without evidence of compression fractures, although some insufficiency endplate changes with smaller Schmorl's nodes are present at several levels. There is no evidence of acute trauma to the posterior elements of the cervical spine.   Craniocervical junction is intact, although degenerative changes at the atlantoaxial articulation with hypertrophic pannus extending into the anterior epidural space of C1 contributing to mild spinal canal narrowing at this level with the effacement of the anterior subarachnoid space.   Facet joints  are preserved without evidence of traumatic subluxation or perching, although multilevel degenerate facet osteoarthropathy is evident, most pronounced at C2-C3 and C3-C4 on the left.   Multilevel intervertebral disc height loss is present, moderate to severe at C4-C5 and C5-C6 and mild at other levels.   No high-grade stenosis is identified in the cervical spine, although mild spinal canal narrowing is suspected at C3-C4, C4-C5 and C5-C6 due to disc osteophyte complexes and ligamentum flavum thickening.   Mild spinal canal narrowing is present at C3-C4 bilaterally and C4-C5 on the left due to uncovertebral and facet joint hypertrophy.   Prevertebral and paraspinal soft tissues do not demonstrate any acute abnormalities. Included lung apices are clear.       CT HEAD: 1. No evidence of hemorrhage, skull fracture, or other acute intracranial trauma/abnormality. 2. Patchy attenuation changes are present in the periventricular and subcortical white matter of bilateral cerebral hemispheres, nonspecific findings favored to represent sequela of microvascular disease.   CT FACIAL BONES: 1. Soft tissue swelling overlies the chin, likely posttraumatic without absence of the underlying osseous injury. No facial or orbital bone fracture is present. 2. Large periapical lucency surrounds the roots of the 2nd right mandibular molar, correlate with dental exam.   CT C-SPINE: 1. No evidence of acute trauma to the cervical spine. 2. Multilevel degenerative changes of the cervical spine are present, with intervertebral disc height loss, facet osteoarthropathy, and mild spinal canal and neural foraminal stenosis at several levels due to disc osteophyte complexes, ligamentum flavum thickening and hypertrophic facet changes.   MACRO: None   Signed by: Miriam Paz 8/17/2024 7:28 PM Dictation workstation:   [id number]    XR hip right with pelvis when performed 2 or 3 views    Result Date: 8/17/2024  STUDY: Femur Radiographs,  Pelvis and Right Hip Radiographs;  08/17/2024 6:18 PM INDICATION: Fall/trauma. COMPARISON: None available. ACCESSION NUMBER(S): [id number], [id number] ORDERING CLINICIAN: SAVANNAH CARLIN TECHNIQUE:  Two views (five images) of the right femur.  One view(s) of the pelvis.  Two view(s) of the right hip hip. FINDINGS:  Right Femur: There is no displaced fracture.  The alignment is anatomic.  There is marked soft tissue swelling anteriorly over the knee. PELVIS: The pelvic ring is intact.  There is no acute fracture.  There are degenerative changes in the included the lower lumbar spine.  The sacroiliac joints are patent.  There is mild narrowing of the left hip joint Right Hip: There is no displaced fracture.  The alignment is anatomic.  There is mild joint space narrowing with acetabular spurring.  Nonspecific soft tissue reticulations.    No acute bony abnormalities on x-ray examination of the pelvis, right femur and right hip. Marked anterior soft tissue swelling over the right knee. Signed by Vonda Sanchez DO    XR femur right 2+ views    Result Date: 8/17/2024  STUDY: Femur Radiographs, Pelvis and Right Hip Radiographs;  08/17/2024 6:18 PM INDICATION: Fall/trauma. COMPARISON: None available. ACCESSION NUMBER(S): [id number], [id number] ORDERING CLINICIAN: SAVANNAH CARLIN TECHNIQUE:  Two views (five images) of the right femur.  One view(s) of the pelvis.  Two view(s) of the right hip hip. FINDINGS:  Right Femur: There is no displaced fracture.  The alignment is anatomic.  There is marked soft tissue swelling anteriorly over the knee. PELVIS: The pelvic ring is intact.  There is no acute fracture.  There are degenerative changes in the included the lower lumbar spine.  The sacroiliac joints are patent.  There is mild narrowing of the left hip joint Right Hip: There is no displaced fracture.  The alignment is anatomic.  There is mild joint space narrowing with acetabular spurring.  Nonspecific soft tissue  reticulations.    No acute bony abnormalities on x-ray examination of the pelvis, right femur and right hip. Marked anterior soft tissue swelling over the right knee. Signed by Vonda Sanchez DO    XR tibia fibula right 2 views    Result Date: 8/17/2024  STUDY: Knee Radiographs; 08/17/2024 6:17 PM INDICATION: Fall with right knee and leg pain. COMPARISON: None. ACCESSION NUMBER(S): [id number], [id number] ORDERING CLINICIAN: SAVANNAH CARLIN TECHNIQUE:  Right Knee:  Three view(s) of the right knee. Right Tibia/Fibula:  Five view(s) of the right tibia and fibula. FINDINGS:  Right Knee:  There is no displaced fracture.  The alignment is anatomic.  No soft tissue abnormality is seen.  There is no joint effusion. There is prominent soft tissue swelling anterior to the patella. Right Tibia/Fibula:  There is no displaced fracture.  The alignment is anatomic.  No soft tissue abnormality is seen.    Prominent soft tissue swelling anterior to the patella. No fracture or dislocation. Signed by Andre Chanel MD    XR knee right 1-2 views    Result Date: 8/17/2024  STUDY: Knee Radiographs; 08/17/2024 6:17 PM INDICATION: Fall with right knee and leg pain. COMPARISON: None. ACCESSION NUMBER(S): [id number], [id number] ORDERING CLINICIAN: SAVANNAH CARLIN TECHNIQUE:  Right Knee:  Three view(s) of the right knee. Right Tibia/Fibula:  Five view(s) of the right tibia and fibula. FINDINGS:  Right Knee:  There is no displaced fracture.  The alignment is anatomic.  No soft tissue abnormality is seen.  There is no joint effusion. There is prominent soft tissue swelling anterior to the patella. Right Tibia/Fibula:  There is no displaced fracture.  The alignment is anatomic.  No soft tissue abnormality is seen.    Prominent soft tissue swelling anterior to the patella. No fracture or dislocation. Signed by Andre Chanel MD      Assessment/Plan    Gal Cramer is a 84 y.o. male with a hx of dementia, HTN, HLD, GERD, peripheral  neuropathy, and BPH (self-cath 3-4x every day) admitted following unprovoked mechanical fall resulting in R. Knee hematoma.     #Mechanical Fall  #R knee hematoma  - CT knee without fracture but with overlying hematoma causing lateral patellar deviation  - CT Head, maxillofacial bones and hip/femur xrays without acute abnormality  Plan:  PT/OT  Pain control with lidocaine patch, tylenol, oxy 5 prn  Trend CBC  Will julio c hematoma to assess for expansion  Orthopedic surgery consult for eval  DVT prophylaxis held given hematoma. Consider starting after repeat CBC to evaluate hematoma progression     #Dementia  - Poor memory and neurocognitive status at baseline. Patient somewhat confused on exam.  Plan:  Hold donepezil as borderline Qtc (504) in conjunction with lexapro  Continue mirtazapine, escitalapram, and memantine     #HTN  #HLD  Plan:  Continue home statin and lisinopril 20      #BPH  Plan:  Continue home finasteride   Pt to continue straight-cathing per home regimen (3-4x daily)     #VEDA on CKD  - bl Cr ~1.5, Cr 1.73on admit > 2.06 on repeat  - 1L fluid bolus on admission  Plan:  Continue to trend creatinine on repeat RFPs  Urine electrolytes given worsening creatinine after fluid bolus  Consider additional fluid bolus    #UTI  - Urinalysis: Leuk Esterase 500; Ketones trace; WBC 21-50   - asymptomatic, mental status at baseline  Plan:  Continue to monitor for symptoms suggestive of UTI. Hold abx for now.     #Dispo  -PT/OT ordered     F: PRN  E: PRN  N: Regular diet  GI: None indicated  DVT prophylaxis: SCDs.  Code status: Full (CONFIRMED ON ADMISSION)  Surrogate decision maker:  Max [phone]     COY BRISCOE, MS4

## 2024-08-19 NOTE — H&P
[facility] Department of Orthopaedic Surgery   Surgical History & Physical <30 Days  08/19/24    Reason for Surgery: Right thigh hematoma   Planned Procedure: Right thigh hematoma evacuation     History & Physical Reviewed:  I have reviewed the History and Physical for obtained within the last 30 days. Relevant findings and updates are noted below:  No significant changes.    Home medications were reviewed with significant updates noted below:  No significant changes.    ERAS patient?: No    COVID-19 Risk Consent:   Surgeon has reviewed the key risks related to tawanna COVID-19 and subsequent sequelae.     08/19/24 at 4:45 AM - MD Schuyler Cohn MD   Orthopedic Surgery PGY1  [facility]     This patient will be followed by the Orthopaedic Trauma service. Please page or Epic Chat the corresponding residents below with questions or concerns.       Ortho Trauma Service (Epic Chat Preferred)  First call: Schuyler Becerra MD PGY-1  First call: Gabriel Magallanes MD PGY-1  Second call: Jose Mckeon PGY-2  Third call: Román Austin PGY-3    6pm-6am M-F, Holidays, and weekends page Ortho on-call @[phone] with urgent questions/concerns.

## 2024-08-19 NOTE — PROGRESS NOTES
08/19/24 1413   Discharge Planning   Living Arrangements Spouse/significant other   Support Systems Spouse/significant other   Assistance Needed Patient independnent with ADL's   Type of Residence Private residence   Number of Stairs to Enter Residence 0   Number of Stairs Within Residence 0   Do you have animals or pets at home? No   Who is requesting discharge planning? Provider   Home or Post Acute Services In home services   Expected Discharge Disposition HH Services   Does the patient need discharge transport arranged? No   Financial Resource Strain   How hard is it for you to pay for the very basics like food, housing, medical care, and heating? Not hard   Housing Stability   In the last 12 months, was there a time when you were not able to pay the mortgage or rent on time? N   At any time in the past 12 months, were you homeless or living in a shelter (including now)? N   Transportation Needs   In the past 12 months, has lack of transportation kept you from medical appointments or from getting medications? no     Assessment Note:  Met with patient and introduced myself as care coordinator and member of the Care Transitions team for discharge planning. Pt feels safe at home.   Transportation: Spouse  Pharmacy: [facility]  DME: none  Previous home care: Yes [facility]  Falls: yes, reason for visit  PCP: [facility]  Dialysis: no    Address, phone number and emergency contact verified. All questions and concerns addressed. Will continue to follow patient for discharge needs.    Transitional Care Coordination Progress Note:  Patient discussed during interdisciplinary rounds.   Team members present: MD & TCC  Plan per Medical/Surgical team: Patient admitted for fall in home OR today PT/OT eval  Payor: Medicare A/B   Discharge disposition: TBD PT/OT eval  Potential Barriers: none   ADOD: 8/23    VIKI LozadaN-RN

## 2024-08-20 PROBLEM — S82.841A ANKLE FRACTURE, BIMALLEOLAR, CLOSED, RIGHT, INITIAL ENCOUNTER: Status: ACTIVE | Noted: 2024-08-17

## 2024-08-20 LAB
ALBUMIN SERPL BCP-MCNC: 3.5 G/DL (ref 3.4–5)
ANION GAP SERPL CALC-SCNC: 13 MMOL/L (ref 10–20)
BUN SERPL-MCNC: 31 MG/DL (ref 6–23)
CALCIUM SERPL-MCNC: 8.7 MG/DL (ref 8.6–10.6)
CHLORIDE SERPL-SCNC: 107 MMOL/L (ref 98–107)
CO2 SERPL-SCNC: 28 MMOL/L (ref 21–32)
CREAT SERPL-MCNC: 1.8 MG/DL (ref 0.5–1.3)
EGFRCR SERPLBLD CKD-EPI 2021: 36 ML/MIN/1.73M*2
ERYTHROCYTE [DISTWIDTH] IN BLOOD BY AUTOMATED COUNT: 12.4 % (ref 11.5–14.5)
GLUCOSE SERPL-MCNC: 90 MG/DL (ref 74–99)
HCT VFR BLD AUTO: 26.1 % (ref 41–52)
HGB BLD-MCNC: 9.4 G/DL (ref 13.5–17.5)
MAGNESIUM SERPL-MCNC: 1.94 MG/DL (ref 1.6–2.4)
MCH RBC QN AUTO: 35.2 PG (ref 26–34)
MCHC RBC AUTO-ENTMCNC: 36 G/DL (ref 32–36)
MCV RBC AUTO: 98 FL (ref 80–100)
NRBC BLD-RTO: 0 /100 WBCS (ref 0–0)
PHOSPHATE SERPL-MCNC: 3.3 MG/DL (ref 2.5–4.9)
PLATELET # BLD AUTO: 157 X10*3/UL (ref 150–450)
POTASSIUM SERPL-SCNC: 3.8 MMOL/L (ref 3.5–5.3)
RBC # BLD AUTO: 2.67 X10*6/UL (ref 4.5–5.9)
SODIUM SERPL-SCNC: 144 MMOL/L (ref 136–145)
WBC # BLD AUTO: 7.5 X10*3/UL (ref 4.4–11.3)

## 2024-08-20 PROCEDURE — 97535 SELF CARE MNGMENT TRAINING: CPT | Mod: GO

## 2024-08-20 PROCEDURE — 97165 OT EVAL LOW COMPLEX 30 MIN: CPT | Mod: GO

## 2024-08-20 PROCEDURE — 51701 INSERT BLADDER CATHETER: CPT

## 2024-08-20 PROCEDURE — 99232 SBSQ HOSP IP/OBS MODERATE 35: CPT | Performed by: STUDENT IN AN ORGANIZED HEALTH CARE EDUCATION/TRAINING PROGRAM

## 2024-08-20 PROCEDURE — 36415 COLL VENOUS BLD VENIPUNCTURE: CPT | Performed by: INTERNAL MEDICINE

## 2024-08-20 PROCEDURE — 2500000001 HC RX 250 WO HCPCS SELF ADMINISTERED DRUGS (ALT 637 FOR MEDICARE OP)

## 2024-08-20 PROCEDURE — 2500000005 HC RX 250 GENERAL PHARMACY W/O HCPCS

## 2024-08-20 PROCEDURE — 2500000004 HC RX 250 GENERAL PHARMACY W/ HCPCS (ALT 636 FOR OP/ED): Mod: JZ

## 2024-08-20 PROCEDURE — 97530 THERAPEUTIC ACTIVITIES: CPT | Mod: GP

## 2024-08-20 PROCEDURE — 80069 RENAL FUNCTION PANEL: CPT | Performed by: INTERNAL MEDICINE

## 2024-08-20 PROCEDURE — 2500000004 HC RX 250 GENERAL PHARMACY W/ HCPCS (ALT 636 FOR OP/ED)

## 2024-08-20 PROCEDURE — 83735 ASSAY OF MAGNESIUM: CPT | Performed by: INTERNAL MEDICINE

## 2024-08-20 PROCEDURE — 97162 PT EVAL MOD COMPLEX 30 MIN: CPT | Mod: GP

## 2024-08-20 PROCEDURE — 85027 COMPLETE CBC AUTOMATED: CPT | Performed by: INTERNAL MEDICINE

## 2024-08-20 PROCEDURE — 2500000002 HC RX 250 W HCPCS SELF ADMINISTERED DRUGS (ALT 637 FOR MEDICARE OP, ALT 636 FOR OP/ED)

## 2024-08-20 PROCEDURE — 1100000001 HC PRIVATE ROOM DAILY

## 2024-08-20 RX ORDER — POLYETHYLENE GLYCOL 3350 17 G/17G
17 POWDER, FOR SOLUTION ORAL DAILY
Status: DISCONTINUED | OUTPATIENT
Start: 2024-08-20 | End: 2024-08-21 | Stop reason: HOSPADM

## 2024-08-20 RX ADMIN — FINASTERIDE 5 MG: 5 TABLET, FILM COATED ORAL at 08:55

## 2024-08-20 RX ADMIN — ASPIRIN 81 MG: 81 TABLET, COATED ORAL at 20:58

## 2024-08-20 RX ADMIN — ESCITALOPRAM 10 MG: 20 TABLET, FILM COATED ORAL at 08:58

## 2024-08-20 RX ADMIN — PANTOPRAZOLE SODIUM 20 MG: 20 TABLET, DELAYED RELEASE ORAL at 06:09

## 2024-08-20 RX ADMIN — CEFAZOLIN SODIUM 2 G: 2 INJECTION, SOLUTION INTRAVENOUS at 13:47

## 2024-08-20 RX ADMIN — DONEPEZIL HYDROCHLORIDE 10 MG: 10 TABLET, FILM COATED ORAL at 08:58

## 2024-08-20 RX ADMIN — LIDOCAINE 1 PATCH: 4 PATCH TOPICAL at 08:57

## 2024-08-20 RX ADMIN — LISINOPRIL 20 MG: 20 TABLET ORAL at 08:56

## 2024-08-20 RX ADMIN — Medication 2000 UNITS: at 06:09

## 2024-08-20 RX ADMIN — SENNOSIDES 17.2 MG: 8.6 TABLET, FILM COATED ORAL at 20:58

## 2024-08-20 RX ADMIN — POLYETHYLENE GLYCOL 3350 17 G: 17 POWDER, FOR SOLUTION ORAL at 11:30

## 2024-08-20 RX ADMIN — ACETAMINOPHEN 975 MG: 325 TABLET ORAL at 20:57

## 2024-08-20 RX ADMIN — SENNOSIDES 17.2 MG: 8.6 TABLET, FILM COATED ORAL at 08:57

## 2024-08-20 RX ADMIN — MIRTAZAPINE 15 MG: 15 TABLET, FILM COATED ORAL at 20:58

## 2024-08-20 RX ADMIN — CEFAZOLIN SODIUM 2 G: 2 INJECTION, SOLUTION INTRAVENOUS at 02:21

## 2024-08-20 RX ADMIN — MEMANTINE 10 MG: 10 TABLET ORAL at 08:56

## 2024-08-20 RX ADMIN — ASPIRIN 81 MG: 81 TABLET, COATED ORAL at 08:56

## 2024-08-20 ASSESSMENT — COGNITIVE AND FUNCTIONAL STATUS - GENERAL
PERSONAL GROOMING: A LITTLE
DRESSING REGULAR UPPER BODY CLOTHING: A LITTLE
MOBILITY SCORE: 16
DRESSING REGULAR LOWER BODY CLOTHING: A LOT
MOVING TO AND FROM BED TO CHAIR: A LITTLE
TURNING FROM BACK TO SIDE WHILE IN FLAT BAD: A LITTLE
HELP NEEDED FOR BATHING: A LOT
TOILETING: A LITTLE
WALKING IN HOSPITAL ROOM: A LITTLE
CLIMB 3 TO 5 STEPS WITH RAILING: TOTAL
DAILY ACTIVITIY SCORE: 16
MOVING FROM LYING ON BACK TO SITTING ON SIDE OF FLAT BED WITH BEDRAILS: A LITTLE
STANDING UP FROM CHAIR USING ARMS: A LITTLE
EATING MEALS: A LITTLE

## 2024-08-20 ASSESSMENT — ACTIVITIES OF DAILY LIVING (ADL)
ADL_ASSISTANCE: INDEPENDENT
ADL_ASSISTANCE: INDEPENDENT
HOME_MANAGEMENT_TIME_ENTRY: 14

## 2024-08-20 ASSESSMENT — PAIN DESCRIPTION - LOCATION: LOCATION: LEG

## 2024-08-20 ASSESSMENT — PAIN - FUNCTIONAL ASSESSMENT
PAIN_FUNCTIONAL_ASSESSMENT: 0-10

## 2024-08-20 ASSESSMENT — PAIN DESCRIPTION - ORIENTATION: ORIENTATION: RIGHT

## 2024-08-20 ASSESSMENT — PAIN SCALES - GENERAL
PAINLEVEL_OUTOF10: 4
PAINLEVEL_OUTOF10: 0 - NO PAIN

## 2024-08-20 NOTE — PROGRESS NOTES
Physical Therapy    Physical Therapy Evaluation & Treatment    Patient Name: Gal Cramer  MRN: [id number]  Today's Date: 8/20/2024   Time Calculation  Start Time: 1040  Stop Time: 1104  Time Calculation (min): 24 min    Assessment/Plan   PT Assessment  PT Assessment Results: Decreased endurance, Impaired balance, Decreased mobility  Rehab Prognosis: Good  Medical Staff Made Aware: Yes  End of Session Communication: Bedside nurse  Assessment Comment: Patient is a 83yo M presenting with mechanical fall resulting in R. Knee hematoma. S/p ortho surgery I&D of R knee hematoma on 8/19/24. Patient lives with supportive spouse and tolerated session well. Pt is below baseline and required FWW for ambulation. dc rec mod  End of Session Patient Position: Up in chair, Alarm off, caregiver present   IP OR SWING BED PT PLAN  Inpatient or Swing Bed: Inpatient  PT Plan  Treatment/Interventions: Bed mobility, Transfer training, Gait training, Balance training, Strengthening, Neuromuscular re-education, Endurance training, Therapeutic exercise, Range of motion, Therapeutic activity  PT Plan: Ongoing PT  PT Frequency: 5 times per week  PT Discharge Recommendations: Moderate intensity level of continued care  Equipment Recommended upon Discharge: Wheeled walker  PT Recommended Transfer Status: Assistive device  PT - OK to Discharge: Yes (indicates PT eval complete and dc rec determined)      Subjective     General Visit Information:  General  Reason for Referral: admitted following unprovoked mechanical fall resulting in R. Knee hematoma. S/p ortho surgery I&D of R knee hematoma on 8/19/24.  Past Medical History Relevant to Rehab: dementia, HTN, HLD, GERD, peripheral neuropathy, and BPH (self-cath 3-4x every day)  Family/Caregiver Present: Yes  Caregiver Feedback:  present, supportive  Co-Treatment: OT  Co-Treatment Reason: maximize pt safety and function, expedite dc  Prior to Session Communication: Bedside nurse  Patient  Position Received: Bed, 3 rail up, Alarm off, not on at start of session  General Comment: pt supine in bed, RN cleared. pleasant and cooperative  Home Living:  Home Living  Type of Home: Meg  Lives With: Spouse  Home Adaptive Equipment: Cane  Home Layout: One level  Home Access: Elevator  Bathroom Shower/Tub: Tub/shower unit, Walk-in shower  Bathroom Equipment: None  Prior Level of Function:  Prior Function Per Pt/Caregiver Report  Level of [place]: Independent with ADLs and functional transfers  ADL Assistance: Independent  Homemaking Assistance:  (spouse assists)  Ambulatory Assistance: Independent  Prior Function Comments: 3 falls in the past 3 weeks.  Precautions:  Precautions  LE Weight Bearing Status: Weight Bearing as Tolerated    Objective   Pain:  Pain Assessment  Pain Assessment: 0-10  0-10 (Numeric) Pain Score:  (denies pain but reports discomfort from ace banadage)  Pain Location:  (RLE)  Cognition:  Cognition  Overall Cognitive Status: Impaired  Orientation Level: Disoriented to time, Disoriented to situation    General Assessments:     Activity Tolerance  Endurance: Endurance does not limit participation in activity    Sensation  Light Touch: No apparent deficits       Static Sitting Balance  Static Sitting-Level of Assistance: Close supervision  Static Sitting-Comment/Number of Minutes: 5  Functional Assessments:  Bed Mobility  Bed Mobility: Yes  Bed Mobility 1  Bed Mobility 1: Supine to sitting  Level of Assistance 1: Contact guard, Minimal verbal cues  Bed Mobility Comments 1: HOB elevated slightly    Transfers  Transfer: Yes  Transfer 1  Transfer From 1: Sit to, Stand to  Transfer to 1: Stand, Sit  Technique 1: Sit to stand, Stand to sit  Transfer Device 1: Walker  Transfer Level of Assistance 1: Minimum assistance  Trials/Comments 1: stood from EOB and from chair    Ambulation/Gait Training  Ambulation/Gait Training Performed: Yes  Ambulation/Gait Training 1  Surface 1: Level tile  Device  1: Rolling walker  Assistance 1: Contact guard  Quality of Gait 1: Decreased step length  Comments/Distance (ft) 1: ambulated 3' to chair, then 100' x2 in morgan. Patient required cues for walker management/placement and for safe turning  Extremity/Trunk Assessments:  RLE   RLE :  (3/5 observed through functional mobility)  LLE   LLE : Within Functional Limits  Treatments:  Therapeutic Activity  Therapeutic Activity Performed: Yes  Therapeutic Activity 1: pt completed bed mobility, sat EOB then stood with FWW and cues for hand placement. transfered to chair with lateral steps. Patient then stood from chair and ambulated into morgan and back to room. cues for walker throughout  Outcome Measures:  [facility] Basic Mobility  Turning from your back to your side while in a flat bed without using bedrails: A little  Moving from lying on your back to sitting on the side of a flat bed without using bedrails: A little  Moving to and from bed to chair (including a wheelchair): A little  Standing up from a chair using your arms (e.g. wheelchair or bedside chair): A little  To walk in hospital room: A little  Climbing 3-5 steps with railing: Total  Basic Mobility - Total Score: 16    Encounter Problems       Encounter Problems (Active)       Mobility       STG - Patient will ambulate > 300' LRAD modif indep  (Progressing)       Start:  08/20/24    Expected End:  09/10/24               PT Transfers       STG - Patient will perform bed mobility indep  (Progressing)       Start:  08/20/24    Expected End:  09/10/24            STG - Patient will transfer sit to and from stand modif indep LRAD (Progressing)       Start:  08/20/24    Expected End:  09/10/24               Pain - Adult              Education Documentation  Precautions, taught by Heaven Suarez, PT at 8/20/2024  1:22 PM.  Learner: Patient  Readiness: Acceptance  Method: Explanation  Response: Verbalizes Understanding  Comment: edu on role of PT, dc plan and safety    Mobility  Training, taught by Heaven Suarez PT at 8/20/2024  1:22 PM.  Learner: Patient  Readiness: Acceptance  Method: Explanation  Response: Verbalizes Understanding  Comment: edu on role of PT, dc plan and safety    Education Comments  No comments found.

## 2024-08-20 NOTE — CARE PLAN
Problem: Fall/Injury  Goal: Not fall by end of shift  Outcome: Progressing  Goal: Be free from injury by end of the shift  Outcome: Progressing  Goal: Verbalize understanding of personal risk factors for fall in the hospital  Outcome: Progressing  Goal: Verbalize understanding of risk factor reduction measures to prevent injury from fall in the home  Outcome: Progressing  Goal: Use assistive devices by end of the shift  Outcome: Progressing  Goal: Pace activities to prevent fatigue by end of the shift  Outcome: Progressing     Problem: Pain - Adult  Goal: Verbalizes/displays adequate comfort level or baseline comfort level  Outcome: Progressing     Problem: Safety - Adult  Goal: Free from fall injury  Outcome: Progressing     Problem: Discharge Planning  Goal: Discharge to home or other facility with appropriate resources  Outcome: Progressing     Problem: Skin  Goal: Decreased wound size/increased tissue granulation at next dressing change  Outcome: Progressing  Goal: Promote/optimize nutrition  Outcome: Progressing  Goal: Promote skin healing  Outcome: Progressing   The patient's goals for the shift include      The clinical goals for the shift include Patient will remain safe throughout this shift

## 2024-08-20 NOTE — PROGRESS NOTES
"Orthopaedic Surgery Progress Note    PROCEDURE: Right lower extremity hematoma evacuation   DOS: 8/19/24  PRIMARY: Medicine     SUBJECTIVE:  No acute events overnight. AFVSS. Pain well controlled. Denies CP, SOB, N/T, N/V.    OBJECTIVE:  BP (!) 171/91   Pulse 65   Temp 36.4 °C (97.5 °F)   Resp 18   Ht 1.75 m (5' 8.9\")   Wt 82 kg (180 lb 12.4 oz)   SpO2 99%   BMI 26.78 kg/m²     PHYSICAL EXAM  Gen: NAD  HEENT: normocephalic atraumatic  Psych: appropriate mood and affect  Resp: nonlabored breathing    Cardiac: Extremities WWP    Neuro: Motor and sensory grossly in tact.    Skin: no rashes    MSK:  Right Lower Extremity:   - Compressive ACE, Mepilex in place (remove POD7 8/26/24)   - HV drain  -Tender at incisional sites   -Fires EHL/TA/Gastroc   -SILT in sural, saphenous, superficial/deep peroneal, tibial nerve distributions  -Foot warm, well perfused  -Palpable DP pulse   -Compartments soft and compressible     ASSESSMENT: 4 y.o. male (dementia, HTN, HLD, and BPH) p/a fall down 1 stair with R thigh hematoma. Denies thinners. Exam w/anteromedial hematoma and ruptured pressure blister, NVI. Compartments soft. Ligamentous exam stable. XR/CT w/o osseous or intra-articular hemarthrosis. Aspiration attempted w/ 5 cc bloody fluid. Now s/p RLE hematoma evacuation w/ Dr. Schmitz on 8/19/24.     Injuries:   - RLE traumatic hematoma      PLAN:   - Appreciate excellent care per Medicine   - WB: WBAT RLE    - Abx: Perioperative Ancef 2g q8h x 3 doses   - Diet: Per primary, okay to advance as tolerated from an ortho perspective   - DVT: Per primary, recommend SCDs and ASA 81mg BID   - Dressing(s): Remove Mepliex dressing(s) POD7 (8/26/24)   - Booker: None   - Drain:  Hemovac x 1     Recommended Discharge Medications:   - Oxycodone immediate release tablet 5mg q6h x 7d   - Acetaminophen 650mg q6h x 30d   - Asprin 81mg BID x 30d   - Calcium carbonate-vitamin D tablet 500mg-200units every day    - Zofran-ODT 4mg q8h x 30d   - " Miralax 17g packet every day x 30d        DISPOSITION: Per primary     This patient was seen and staffed with the attending on call, Dr. Jose Schmitz .    Jude Escudero MD  PGY-3 Orthopedic Surgery  [facility]  Who Works Around You Preferred  Pager: [phone]    While admitted, this patient will be followed by the Ortho Trauma Team. Please contact below residents with any questions (available via Epic Chat).     First call: Schuyler Becerra, PGY-1, Clint Magallanes, PGY-2  Second call: Kyle Snyder PGY-2   Third call: Jude Escudero, PGY-3    6pm-6am M-F, Holidays, and weekends page Ortho on-call @[phone] with urgent questions/concerns.

## 2024-08-20 NOTE — PROGRESS NOTES
Gal Cramer is a 84 y.o. male on day 1 of admission presenting with Fall (on) (from) other stairs and steps, initial encounter.      Subjective   - NAEON. POD1 R I&D for hematoma evacuation of knee.  - ASA 81 BID starting today  - Hypertensive in AM (171/53). Denies pain, not getting PRN oxycodone. On home lisinopril 20mg.    Labs:  RFP: BUN 31; Cr 1.8 (downtrending); GFR 36  CBC: Hgb 9.4; Hct 26.1, Plt 157  Mg 1.94    Studies:  XR Lumbar spine (8/18):   1. No acute fracture or traumatic subluxation of the lumbar spine.  2. Mild superior endplate deformities from T12-L2 vertebral bodies without bony retropulsion.  3. Multilevel moderate degenerative changes with severe degenerative change at L4-L5.    CT R Knee (8/18):  No acute osseous abnormalities. Soft tissue mass in anterior subcutaneous fat consistent with hematoma    CT Head/Facial Bones/C-Spine (8/18):  CT HEAD:  1. No evidence of hemorrhage, skull fracture, or other acute intracranial trauma/abnormality.  2. Patchy attenuation changes are present in the periventricular and subcortical white matter of bilateral cerebral hemispheres, nonspecific findings favored to represent sequela of microvascular disease.  CT FACIAL BONES:  1. Soft tissue swelling overlies the chin, likely posttraumatic without absence of the underlying osseous injury. No facial or orbital bone fracture is present.  2. Large periapical lucency surrounds the roots of the 2nd right  mandibular molar, correlate with dental exam.    CT C-SPINE:  1. No evidence of acute trauma to the cervical spine.  2. Multilevel degenerative changes of the cervical spine are present, with intervertebral disc height loss, facet osteoarthropathy, and mild spinal canal and neural foraminal stenosis at several levels due to disc osteophyte complexes, ligamentum flavum thickening and hypertrophic facet changes.    XR hip, pelvis, femur, tibia/fibula, knee all unremarkable except for soft tissue swelling of  the R anterior knee    Brief Plan:  Q6 straight cath scheduled  Tylenol 975mg BID scheduled for pain postop  ASA 81 BID for anticoagulation per ortho  PT/OT eval. Consider DC later today  Miralax every day for constipation       Objective     Last Recorded Vitals  BP (!) 171/91   Pulse 65   Temp 36.4 °C (97.5 °F)   Resp 18   Wt 82 kg (180 lb 12.4 oz)   SpO2 99%   Intake/Output last 3 Shifts:    Intake/Output Summary (Last 24 hours) at 8/20/2024 1057  Last data filed at 8/20/2024 0627  Gross per 24 hour   Intake 535 ml   Output 460 ml   Net 75 ml       Admission Weight  Weight: 81.6 kg (180 lb) (08/17/24 1729)    Daily Weight  08/19/24 : 82 kg (180 lb 12.4 oz)    Image Results  XR chest 1 view  Narrative: Interpreted By:  Andrew Cope,   STUDY:  XR CHEST 1 VIEW;  8/18/2024 5:57 pm      INDICATION:  Signs/Symptoms:preop eval.      COMPARISON:  None.      ACCESSION NUMBER(S):  [id number]      ORDERING CLINICIAN:  YAEL VALLADARES      FINDINGS:  AP radiograph of the chest was provided.              CARDIOMEDIASTINAL SILHOUETTE:  Cardiomediastinal silhouette is normal in size and configuration.      LUNGS:  Lungs are clear.      ABDOMEN:  No remarkable upper abdominal findings.      BONES:  No acute osseous changes.      Impression: 1.  No evidence of acute cardiopulmonary process.              MACRO:  None      Signed by: Andrew Cope 8/18/2024 7:20 PM  Dictation workstation:   [id number]  XR lumbar spine 2-3 views  Narrative: Interpreted By:  Araceli Wisdom and Kamau Nyokabi   STUDY:  XR LUMBAR SPINE 2-3 VIEWS; ;  8/18/2024 10:22 am      INDICATION:  Signs/Symptoms:left low back pain after fall.      COMPARISON:  Abdominal radiograph 03/13/2012  MRI of the lumbar spine 12/28/2013      ACCESSION NUMBER(S):  [id number]      ORDERING CLINICIAN:  SAVANNAH CARLIN      FINDINGS:  Three views of the lumbar spine.      Mild dextrocurvature of the lumbar spine.      No traumatic subluxation. No acute  fractures.      Mild superior endplate concavity of L2 likely representing chronic  compression fracture. Rest of the vertebral body heights are  maintained. Severe degenerative changes from L3-4 to L5-S1.      Impression: Likely chronic mild L2 vertebral body compression fracture.      Severe degenerative changes from L3-4 to L5-S1.      Mild dextroscoliosis.      I personally reviewed the images/study and I agree with Dr. John Paul Dorman findings as stated. This study was interpreted at [place], [place]      MACRO:  None      Signed by: Araceli Wisdom 8/18/2024 1:43 PM  Dictation workstation:   [id number]      Physical Exam  Constitutional:       General: He is not in acute distress.     Appearance: Normal appearance. He is normal weight.   HENT:      Head: Normocephalic and atraumatic.      Right Ear: External ear normal.      Left Ear: External ear normal.      Nose: Nose normal.      Mouth/Throat:      Mouth: Mucous membranes are moist.   Eyes:      General: No scleral icterus.     Extraocular Movements: Extraocular movements intact.      Conjunctiva/sclera: Conjunctivae normal.      Pupils: Pupils are equal, round, and reactive to light.   Cardiovascular:      Rate and Rhythm: Normal rate and regular rhythm.      Pulses: Normal pulses.      Heart sounds: Normal heart sounds. No murmur heard.     No friction rub. No gallop.   Pulmonary:      Effort: Pulmonary effort is normal. No respiratory distress.      Breath sounds: Normal breath sounds. No wheezing.   Abdominal:      General: Abdomen is flat. Bowel sounds are normal. There is no distension.      Palpations: Abdomen is soft. There is no mass.      Tenderness: There is no abdominal tenderness.   Musculoskeletal:         General: Swelling, tenderness (mild) and signs of injury present. No deformity. Normal range of motion.      Cervical back: Normal range of motion.      Right lower leg: No edema.      Left  lower leg: No edema.      Comments: RLE edema. R. Knee with large medial hematoma with hemorrhagic blister on admission s.p rupture. Additional small skin scraping on R knee below injury. Surrounding bruising both proximally and distally. R leg 2+ edema/swelling. No pain   Skin:     General: Skin is warm and dry.      Capillary Refill: Capillary refill takes less than 2 seconds.   Neurological:      General: No focal deficit present.      Mental Status: He is alert and oriented to person, place, and time. Mental status is at baseline.      Sensory: No sensory deficit.   Psychiatric:         Mood and Affect: Mood normal.         Behavior: Behavior normal.         Thought Content: Thought content normal.         Medications  Scheduled medications  acetaminophen, 975 mg, oral, TID  aspirin, 81 mg, oral, BID  ceFAZolin, 2 g, intravenous, q12h  cholecalciferol, 2,000 Units, oral, Daily  donepezil, 10 mg, oral, Daily  escitalopram, 10 mg, oral, Daily  finasteride, 5 mg, oral, Daily  lidocaine, 1 patch, transdermal, Daily  lisinopril, 20 mg, oral, Daily  memantine, 10 mg, oral, Daily  mirtazapine, 15 mg, oral, Nightly  pantoprazole, 20 mg, oral, Daily before breakfast  pravastatin, 10 mg, oral, Every Mon/Wed/Fri  sennosides, 2 tablet, oral, BID      Continuous medications     PRN medications  PRN medications: hydrALAZINE, oxyCODONE    Relevant Results  Results for orders placed or performed during the hospital encounter of 08/17/24 (from the past 24 hour(s))   CBC   Result Value Ref Range    WBC 7.5 4.4 - 11.3 x10*3/uL    nRBC 0.0 0.0 - 0.0 /100 WBCs    RBC 2.67 (L) 4.50 - 5.90 x10*6/uL    Hemoglobin 9.4 (L) 13.5 - 17.5 g/dL    Hematocrit 26.1 (L) 41.0 - 52.0 %    MCV 98 80 - 100 fL    MCH 35.2 (H) 26.0 - 34.0 pg    MCHC 36.0 32.0 - 36.0 g/dL    RDW 12.4 11.5 - 14.5 %    Platelets 157 150 - 450 x10*3/uL   Renal Function Panel   Result Value Ref Range    Glucose 90 74 - 99 mg/dL    Sodium 144 136 - 145 mmol/L    Potassium  3.8 3.5 - 5.3 mmol/L    Chloride 107 98 - 107 mmol/L    Bicarbonate 28 21 - 32 mmol/L    Anion Gap 13 10 - 20 mmol/L    Urea Nitrogen 31 (H) 6 - 23 mg/dL    Creatinine 1.80 (H) 0.50 - 1.30 mg/dL    eGFR 36 (L) >60 mL/min/1.73m*2    Calcium 8.7 8.6 - 10.6 mg/dL    Phosphorus 3.3 2.5 - 4.9 mg/dL    Albumin 3.5 3.4 - 5.0 g/dL   Magnesium   Result Value Ref Range    Magnesium 1.94 1.60 - 2.40 mg/dL     XR lumbar spine 2-3 views    Result Date: 8/18/2024  STUDY: XR LUMBAR SPINE 2-3 VIEWS; ;  8/18/2024 10:22 am   INDICATION: Signs/Symptoms:left low back pain after fall.   COMPARISON: Abdominal radiograph 03/13/2012   ACCESSION NUMBER(S): [id number]   ORDERING CLINICIAN: SAVANNAH CARLIN   FINDINGS: Three views of the lumbar spine.   Mild dextrocurvature of the lumbar spine.   No traumatic subluxation. No acute fractures.   Mild superior endplate deformities from T12-L2 vertebral bodies. No bony retropulsion into the spinal canal.   Moderate multilevel degenerative change disc height loss and endplate osteophytosis. Severe degenerative change of the L4-L5. Mild facet arthropathy at L5-S1.       1. No acute fracture or traumatic subluxation of the lumbar spine. 2. Mild superior endplate deformities from T12-L2 vertebral bodies without bony retropulsion. 3. Multilevel moderate degenerative changes with severe degenerative change at L4-L5.     I personally reviewed the images/study and I agree with Dr. John Paul Dorman findings as stated. This study was interpreted at [facility], [place], [place]   MACRO: None     Dictation workstation:   [id number]    ECG 12 lead    Result Date: 8/17/2024  Normal sinus rhythm Right bundle branch block Abnormal ECG When compared with ECG of 13-MAR-2012 08:37, Right bundle branch block is now Present See ED provider note for full interpretation and clinical correlation Confirmed by David Marmolejo ([id number]) on 8/17/2024 10:52:07 PM    ECG 12 lead    Result  Date: 8/17/2024  Normal sinus rhythm Nonspecific intraventricular block Abnormal ECG When compared with ECG of 17-AUG-2024 17:31, Nonspecific intraventricular block has replaced Right bundle branch block See ED provider note for full interpretation and clinical correlation Confirmed by Davdi Marmolejo ([id number]) on 8/17/2024 10:52:02 PM    CT knee right wo IV contrast    Result Date: 8/17/2024  STUDY: CT Right Knee; Completed Time:  8/17/2024 at 7:09 PM INDICATION: Right knee pain and swelling; minimal XR findings. COMPARISON: XR right knee, XR right femur, XR right hip and XR right tibia and fibula 8/17/2024. ACCESSION NUMBER(S): [id number] ORDERING CLINICIAN: SAVANNAH CARLIN TECHNIQUE:  Thin section axial images were obtained through the right knee without intravenous contrast.  Orthogonal reconstructed images were obtained and reviewed.  Automated mA/kV exposure control was utilized and patient examination was performed in strict accordance with principles of ALARA. FINDINGS: There is a 3.6 x 8.9 x 11.2 cm soft tissue mass anteriorly in the subcutaneous fat.  It is heterogeneous in attenuation and is consistent with a hematoma.    No acute osseous abnormalities. Soft tissue mass in the anterior subcutaneous fat consistent with a hematoma. Signed by Jose Alejandro Cross MD    CT head wo IV contrast    Result Date: 8/17/2024  Interpreted By:  Miriam Paz, STUDY: CT HEAD WO IV CONTRAST; CT CERVICAL SPINE WO IV CONTRAST; CT FACIAL BONES WO IV CONTRAST;  8/17/2024 7:07 pm   INDICATION: Signs/Symptoms:fall struck head, no AC; Signs/Symptoms:fall; Signs/Symptoms:fall, right chibn pain.   COMPARISON: None.   ACCESSION NUMBER(S): [id number]; [id number]; [id number]   ORDERING CLINICIAN: SAVANNAH CARLIN   TECHNIQUE: Noncontrast axial CT scan of head was performed, with coronal and sagittal reformats provided. The images were reviewed in bone, brain, blood and soft tissue windows.   Thin cut axial CT images through  the facial bones were obtained and reconstructed in the coronal and sagittal plane. 3D reconstruction of the facial bones was created on a dedicated workstation and provided for interpretation.   Axial CT images of the cervical spine are obtained. Axial, coronal and sagittal reconstructions are provided for review.   FINDINGS: CT HEAD:   No hyperdense intracranial hemorrhage is identified. There is no mass effect or midline shift.   Gray-white differentiation is intact, without evidence of CT apparent transcortical infarct, although patchy attenuation changes are present in the periventricular and subcortical white matter of bilateral cerebral hemispheres, nonspecific findings favored to represent sequela of microvascular disease.   No abnormal ventricular dilatation is present. Basal cisterns are patent. No extra-axial fluid collections are identified.   Scalp soft tissues do not demonstrate any acute abnormality. Calvarium is unremarkable without evidence of skull fracture.   Mastoid air cells and middle ear cavities are clear.   CT FACIAL BONES:   Bony orbits are intact, without evidence of fracture. Periorbital soft tissues and intraorbital structures are symmetric in appearance without evidence of acute trauma. Patient is status post cataract extraction bilaterally.   No acute facial bone fracture is identified. Nasal bones are unremarkable in appearance.   Paranasal sinuses are well aerated without evidence of air-fluid levels.   Some soft tissue swelling overlies the chin, without evidence of underlying osseous injury. No associated fluid collection or soft tissue gas is present.   Although evaluation of the oral cavity is degraded by beam hardening artifact no definite evidence of acute trauma to the oral cavity is present. Temporomandibular joints are intact, with asymmetric degenerative changes present in the right.   Large periapical lucency surrounds the roots of the 2nd right maxillary molar.   Parotid  and submandibular glands are symmetric in appearance and otherwise unremarkable.   CT C-SPINE:   There is straightening of normal lordotic curvature of the cervical spine, without evidence of significant spondylolisthesis.   Cervical vertebral body heights are preserved without evidence of compression fractures, although some insufficiency endplate changes with smaller Schmorl's nodes are present at several levels. There is no evidence of acute trauma to the posterior elements of the cervical spine.   Craniocervical junction is intact, although degenerative changes at the atlantoaxial articulation with hypertrophic pannus extending into the anterior epidural space of C1 contributing to mild spinal canal narrowing at this level with the effacement of the anterior subarachnoid space.   Facet joints are preserved without evidence of traumatic subluxation or perching, although multilevel degenerate facet osteoarthropathy is evident, most pronounced at C2-C3 and C3-C4 on the left.   Multilevel intervertebral disc height loss is present, moderate to severe at C4-C5 and C5-C6 and mild at other levels.   No high-grade stenosis is identified in the cervical spine, although mild spinal canal narrowing is suspected at C3-C4, C4-C5 and C5-C6 due to disc osteophyte complexes and ligamentum flavum thickening.   Mild spinal canal narrowing is present at C3-C4 bilaterally and C4-C5 on the left due to uncovertebral and facet joint hypertrophy.   Prevertebral and paraspinal soft tissues do not demonstrate any acute abnormalities. Included lung apices are clear.       CT HEAD: 1. No evidence of hemorrhage, skull fracture, or other acute intracranial trauma/abnormality. 2. Patchy attenuation changes are present in the periventricular and subcortical white matter of bilateral cerebral hemispheres, nonspecific findings favored to represent sequela of microvascular disease.   CT FACIAL BONES: 1. Soft tissue swelling overlies the chin,  likely posttraumatic without absence of the underlying osseous injury. No facial or orbital bone fracture is present. 2. Large periapical lucency surrounds the roots of the 2nd right mandibular molar, correlate with dental exam.   CT C-SPINE: 1. No evidence of acute trauma to the cervical spine. 2. Multilevel degenerative changes of the cervical spine are present, with intervertebral disc height loss, facet osteoarthropathy, and mild spinal canal and neural foraminal stenosis at several levels due to disc osteophyte complexes, ligamentum flavum thickening and hypertrophic facet changes.   MACRO: None   Signed by: Miriam Paz 8/17/2024 7:28 PM Dictation workstation:   [id number]    CT maxillofacial bones wo IV contrast    Result Date: 8/17/2024  Interpreted By:  Miriam Paz, STUDY: CT HEAD WO IV CONTRAST; CT CERVICAL SPINE WO IV CONTRAST; CT FACIAL BONES WO IV CONTRAST;  8/17/2024 7:07 pm   INDICATION: Signs/Symptoms:fall struck head, no AC; Signs/Symptoms:fall; Signs/Symptoms:fall, right chibn pain.   COMPARISON: None.   ACCESSION NUMBER(S): [id number]; [id number]; [id number]   ORDERING CLINICIAN: SAVANNAH CARLIN   TECHNIQUE: Noncontrast axial CT scan of head was performed, with coronal and sagittal reformats provided. The images were reviewed in bone, brain, blood and soft tissue windows.   Thin cut axial CT images through the facial bones were obtained and reconstructed in the coronal and sagittal plane. 3D reconstruction of the facial bones was created on a dedicated workstation and provided for interpretation.   Axial CT images of the cervical spine are obtained. Axial, coronal and sagittal reconstructions are provided for review.   FINDINGS: CT HEAD:   No hyperdense intracranial hemorrhage is identified. There is no mass effect or midline shift.   Gray-white differentiation is intact, without evidence of CT apparent transcortical infarct, although patchy attenuation changes are  present in the periventricular and subcortical white matter of bilateral cerebral hemispheres, nonspecific findings favored to represent sequela of microvascular disease.   No abnormal ventricular dilatation is present. Basal cisterns are patent. No extra-axial fluid collections are identified.   Scalp soft tissues do not demonstrate any acute abnormality. Calvarium is unremarkable without evidence of skull fracture.   Mastoid air cells and middle ear cavities are clear.   CT FACIAL BONES:   Bony orbits are intact, without evidence of fracture. Periorbital soft tissues and intraorbital structures are symmetric in appearance without evidence of acute trauma. Patient is status post cataract extraction bilaterally.   No acute facial bone fracture is identified. Nasal bones are unremarkable in appearance.   Paranasal sinuses are well aerated without evidence of air-fluid levels.   Some soft tissue swelling overlies the chin, without evidence of underlying osseous injury. No associated fluid collection or soft tissue gas is present.   Although evaluation of the oral cavity is degraded by beam hardening artifact no definite evidence of acute trauma to the oral cavity is present. Temporomandibular joints are intact, with asymmetric degenerative changes present in the right.   Large periapical lucency surrounds the roots of the 2nd right maxillary molar.   Parotid and submandibular glands are symmetric in appearance and otherwise unremarkable.   CT C-SPINE:   There is straightening of normal lordotic curvature of the cervical spine, without evidence of significant spondylolisthesis.   Cervical vertebral body heights are preserved without evidence of compression fractures, although some insufficiency endplate changes with smaller Schmorl's nodes are present at several levels. There is no evidence of acute trauma to the posterior elements of the cervical spine.   Craniocervical junction is intact, although degenerative  changes at the atlantoaxial articulation with hypertrophic pannus extending into the anterior epidural space of C1 contributing to mild spinal canal narrowing at this level with the effacement of the anterior subarachnoid space.   Facet joints are preserved without evidence of traumatic subluxation or perching, although multilevel degenerate facet osteoarthropathy is evident, most pronounced at C2-C3 and C3-C4 on the left.   Multilevel intervertebral disc height loss is present, moderate to severe at C4-C5 and C5-C6 and mild at other levels.   No high-grade stenosis is identified in the cervical spine, although mild spinal canal narrowing is suspected at C3-C4, C4-C5 and C5-C6 due to disc osteophyte complexes and ligamentum flavum thickening.   Mild spinal canal narrowing is present at C3-C4 bilaterally and C4-C5 on the left due to uncovertebral and facet joint hypertrophy.   Prevertebral and paraspinal soft tissues do not demonstrate any acute abnormalities. Included lung apices are clear.       CT HEAD: 1. No evidence of hemorrhage, skull fracture, or other acute intracranial trauma/abnormality. 2. Patchy attenuation changes are present in the periventricular and subcortical white matter of bilateral cerebral hemispheres, nonspecific findings favored to represent sequela of microvascular disease.   CT FACIAL BONES: 1. Soft tissue swelling overlies the chin, likely posttraumatic without absence of the underlying osseous injury. No facial or orbital bone fracture is present. 2. Large periapical lucency surrounds the roots of the 2nd right mandibular molar, correlate with dental exam.   CT C-SPINE: 1. No evidence of acute trauma to the cervical spine. 2. Multilevel degenerative changes of the cervical spine are present, with intervertebral disc height loss, facet osteoarthropathy, and mild spinal canal and neural foraminal stenosis at several levels due to disc osteophyte complexes, ligamentum flavum thickening and  hypertrophic facet changes.   MACRO: None   Signed by: Miriam Pza 8/17/2024 7:28 PM Dictation workstation:   [id number]    CT cervical spine wo IV contrast    Result Date: 8/17/2024  Interpreted By:  Miriam Paz, STUDY: CT HEAD WO IV CONTRAST; CT CERVICAL SPINE WO IV CONTRAST; CT FACIAL BONES WO IV CONTRAST;  8/17/2024 7:07 pm   INDICATION: Signs/Symptoms:fall struck head, no AC; Signs/Symptoms:fall; Signs/Symptoms:fall, right chibn pain.   COMPARISON: None.   ACCESSION NUMBER(S): [id number]; [id number]; [id number]   ORDERING CLINICIAN: SAVANNAH CARLIN   TECHNIQUE: Noncontrast axial CT scan of head was performed, with coronal and sagittal reformats provided. The images were reviewed in bone, brain, blood and soft tissue windows.   Thin cut axial CT images through the facial bones were obtained and reconstructed in the coronal and sagittal plane. 3D reconstruction of the facial bones was created on a dedicated workstation and provided for interpretation.   Axial CT images of the cervical spine are obtained. Axial, coronal and sagittal reconstructions are provided for review.   FINDINGS: CT HEAD:   No hyperdense intracranial hemorrhage is identified. There is no mass effect or midline shift.   Gray-white differentiation is intact, without evidence of CT apparent transcortical infarct, although patchy attenuation changes are present in the periventricular and subcortical white matter of bilateral cerebral hemispheres, nonspecific findings favored to represent sequela of microvascular disease.   No abnormal ventricular dilatation is present. Basal cisterns are patent. No extra-axial fluid collections are identified.   Scalp soft tissues do not demonstrate any acute abnormality. Calvarium is unremarkable without evidence of skull fracture.   Mastoid air cells and middle ear cavities are clear.   CT FACIAL BONES:   Bony orbits are intact, without evidence of fracture. Periorbital soft tissues  and intraorbital structures are symmetric in appearance without evidence of acute trauma. Patient is status post cataract extraction bilaterally.   No acute facial bone fracture is identified. Nasal bones are unremarkable in appearance.   Paranasal sinuses are well aerated without evidence of air-fluid levels.   Some soft tissue swelling overlies the chin, without evidence of underlying osseous injury. No associated fluid collection or soft tissue gas is present.   Although evaluation of the oral cavity is degraded by beam hardening artifact no definite evidence of acute trauma to the oral cavity is present. Temporomandibular joints are intact, with asymmetric degenerative changes present in the right.   Large periapical lucency surrounds the roots of the 2nd right maxillary molar.   Parotid and submandibular glands are symmetric in appearance and otherwise unremarkable.   CT C-SPINE:   There is straightening of normal lordotic curvature of the cervical spine, without evidence of significant spondylolisthesis.   Cervical vertebral body heights are preserved without evidence of compression fractures, although some insufficiency endplate changes with smaller Schmorl's nodes are present at several levels. There is no evidence of acute trauma to the posterior elements of the cervical spine.   Craniocervical junction is intact, although degenerative changes at the atlantoaxial articulation with hypertrophic pannus extending into the anterior epidural space of C1 contributing to mild spinal canal narrowing at this level with the effacement of the anterior subarachnoid space.   Facet joints are preserved without evidence of traumatic subluxation or perching, although multilevel degenerate facet osteoarthropathy is evident, most pronounced at C2-C3 and C3-C4 on the left.   Multilevel intervertebral disc height loss is present, moderate to severe at C4-C5 and C5-C6 and mild at other levels.   No high-grade stenosis is  identified in the cervical spine, although mild spinal canal narrowing is suspected at C3-C4, C4-C5 and C5-C6 due to disc osteophyte complexes and ligamentum flavum thickening.   Mild spinal canal narrowing is present at C3-C4 bilaterally and C4-C5 on the left due to uncovertebral and facet joint hypertrophy.   Prevertebral and paraspinal soft tissues do not demonstrate any acute abnormalities. Included lung apices are clear.       CT HEAD: 1. No evidence of hemorrhage, skull fracture, or other acute intracranial trauma/abnormality. 2. Patchy attenuation changes are present in the periventricular and subcortical white matter of bilateral cerebral hemispheres, nonspecific findings favored to represent sequela of microvascular disease.   CT FACIAL BONES: 1. Soft tissue swelling overlies the chin, likely posttraumatic without absence of the underlying osseous injury. No facial or orbital bone fracture is present. 2. Large periapical lucency surrounds the roots of the 2nd right mandibular molar, correlate with dental exam.   CT C-SPINE: 1. No evidence of acute trauma to the cervical spine. 2. Multilevel degenerative changes of the cervical spine are present, with intervertebral disc height loss, facet osteoarthropathy, and mild spinal canal and neural foraminal stenosis at several levels due to disc osteophyte complexes, ligamentum flavum thickening and hypertrophic facet changes.   MACRO: None   Signed by: Miriam Paz 8/17/2024 7:28 PM Dictation workstation:   [id number]    XR hip right with pelvis when performed 2 or 3 views    Result Date: 8/17/2024  STUDY: Femur Radiographs, Pelvis and Right Hip Radiographs;  08/17/2024 6:18 PM INDICATION: Fall/trauma. COMPARISON: None available. ACCESSION NUMBER(S): [id number], [id number] ORDERING CLINICIAN: SAVANNAH CARLIN TECHNIQUE:  Two views (five images) of the right femur.  One view(s) of the pelvis.  Two view(s) of the right hip hip. FINDINGS:  Right Femur:  There is no displaced fracture.  The alignment is anatomic.  There is marked soft tissue swelling anteriorly over the knee. PELVIS: The pelvic ring is intact.  There is no acute fracture.  There are degenerative changes in the included the lower lumbar spine.  The sacroiliac joints are patent.  There is mild narrowing of the left hip joint Right Hip: There is no displaced fracture.  The alignment is anatomic.  There is mild joint space narrowing with acetabular spurring.  Nonspecific soft tissue reticulations.    No acute bony abnormalities on x-ray examination of the pelvis, right femur and right hip. Marked anterior soft tissue swelling over the right knee. Signed by Vonda Sanchez DO    XR femur right 2+ views    Result Date: 8/17/2024  STUDY: Femur Radiographs, Pelvis and Right Hip Radiographs;  08/17/2024 6:18 PM INDICATION: Fall/trauma. COMPARISON: None available. ACCESSION NUMBER(S): [id number], [id number] ORDERING CLINICIAN: SAVANNAH CARLIN TECHNIQUE:  Two views (five images) of the right femur.  One view(s) of the pelvis.  Two view(s) of the right hip hip. FINDINGS:  Right Femur: There is no displaced fracture.  The alignment is anatomic.  There is marked soft tissue swelling anteriorly over the knee. PELVIS: The pelvic ring is intact.  There is no acute fracture.  There are degenerative changes in the included the lower lumbar spine.  The sacroiliac joints are patent.  There is mild narrowing of the left hip joint Right Hip: There is no displaced fracture.  The alignment is anatomic.  There is mild joint space narrowing with acetabular spurring.  Nonspecific soft tissue reticulations.    No acute bony abnormalities on x-ray examination of the pelvis, right femur and right hip. Marked anterior soft tissue swelling over the right knee. Signed by Vonda Sanchez DO    XR tibia fibula right 2 views    Result Date: 8/17/2024  STUDY: Knee Radiographs; 08/17/2024 6:17 PM INDICATION: Fall with right knee and leg  pain. COMPARISON: None. ACCESSION NUMBER(S): [id number], [id number] ORDERING CLINICIAN: SAVANNAH CARLIN TECHNIQUE:  Right Knee:  Three view(s) of the right knee. Right Tibia/Fibula:  Five view(s) of the right tibia and fibula. FINDINGS:  Right Knee:  There is no displaced fracture.  The alignment is anatomic.  No soft tissue abnormality is seen.  There is no joint effusion. There is prominent soft tissue swelling anterior to the patella. Right Tibia/Fibula:  There is no displaced fracture.  The alignment is anatomic.  No soft tissue abnormality is seen.    Prominent soft tissue swelling anterior to the patella. No fracture or dislocation. Signed by Andre Chanel MD    XR knee right 1-2 views    Result Date: 8/17/2024  STUDY: Knee Radiographs; 08/17/2024 6:17 PM INDICATION: Fall with right knee and leg pain. COMPARISON: None. ACCESSION NUMBER(S): [id number], [id number] ORDERING CLINICIAN: SAVANNAH CARLIN TECHNIQUE:  Right Knee:  Three view(s) of the right knee. Right Tibia/Fibula:  Five view(s) of the right tibia and fibula. FINDINGS:  Right Knee:  There is no displaced fracture.  The alignment is anatomic.  No soft tissue abnormality is seen.  There is no joint effusion. There is prominent soft tissue swelling anterior to the patella. Right Tibia/Fibula:  There is no displaced fracture.  The alignment is anatomic.  No soft tissue abnormality is seen.    Prominent soft tissue swelling anterior to the patella. No fracture or dislocation. Signed by Andre Chanel MD      Assessment/Plan    Gal Cramer is a 84 y.o. male with a hx of dementia, HTN, HLD, GERD, peripheral neuropathy, and BPH (self-cath 3-4x every day) admitted following unprovoked mechanical fall resulting in R. Knee hematoma. S/p ortho surgery I&D of R knee hematoma on 8/19/24.    #Mechanical Fall  #R knee hematoma  - CT knee without fracture but with overlying hematoma causing lateral patellar deviation  - CT Head, maxillofacial bones  and hip/femur xrays without acute abnormality  - I&D of R knee hematoma with ortho surg 8/19/24.  Plan:  PT/OT prior to dc  Pain control with lidocaine patch, tylenol, oxy 5 prn  Trend CBC  ASA 81mg BID for anticoagulation  Ancef antibiotic postop per ortho recs for 3 doses     #Dementia  - Poor memory and neurocognitive status at baseline. Patient somewhat confused on exam.  Plan:  Hold donepezil as borderline Qtc (504) in conjunction with lexapro  Continue mirtazapine, escitalapram, and memantine     #HTN  #HLD  Plan:  Continue home statin and lisinopril 20      #BPH  Plan:  Continue home finasteride   Straight cath Q6 order placed     #VEDA on CKD  - bl Cr ~1.5, Cr 1.73on admit > 2.06 on repeat > downtrending 1.8  Plan:  Continue to trend creatinine on repeat RFPs    #UTI  - Urinalysis: Leuk Esterase 500; Ketones trace; WBC 21-50   - asymptomatic, mental status at baseline  Plan:  Continue to monitor for symptoms suggestive of UTI. Hold abx for now.    #Constipation  Plan:  Miralax every day      #Dispo  -PT/OT ordered     F: PRN  E: PRN  N: Regular diet  GI: None indicated  DVT prophylaxis: ASA 81 BID  Code status: Full (CONFIRMED ON ADMISSION)  Surrogate decision maker:  Max [phone]     COY BRISCOE, MS4

## 2024-08-20 NOTE — PROGRESS NOTES
Occupational Therapy    Evaluation/Treatment    Patient Name: Gal Cramer  MRN: [id number]  Today's Date: 8/20/2024  Time Calculation  Start Time: 1040  Stop Time: 1104  Time Calculation (min): 24 min    Assessment  IP OT Assessment  OT Assessment: Impaired cognition, ADLs, and functional ambulation.  Prognosis: Good  Barriers to Discharge: None  Evaluation/Treatment Tolerance: Patient tolerated treatment well  Medical Staff Made Aware: Yes  End of Session Communication: Bedside nurse  End of Session Patient Position: Up in chair, Alarm off, caregiver present  Plan:  Treatment Interventions: ADL retraining, Functional transfer training, Endurance training  OT Frequency: 3 times per week  OT Discharge Recommendations: Moderate intensity level of continued care  OT Recommended Transfer Status: Minimal assist, Assist of 1  OT - OK to Discharge: Yes    Subjective     Current Problem:  1. Fall, initial encounter        2. Acute pain of right knee        3. Traumatic hematoma of right knee, initial encounter  Case Request Operating Room: Incision and Drainage Thigh, Evacuation Hematoma Thigh    Case Request Operating Room: Incision and Drainage Thigh, Evacuation Hematoma Thigh    calcium carbonate-vitamin D3 500 mg-5 mcg (200 unit) tablet          General:  Reason for Referral: admitted following unprovoked mechanical fall resulting in R. Knee hematoma. S/p ortho surgery I&D of R knee hematoma on 8/19/24.  Past Medical History Relevant to Rehab: dementia, HTN, HLD, GERD, peripheral neuropathy, and BPH (self-cath 3-4x every day)  Co-Treatment: PT  Prior to Session Communication: Bedside nurse  Patient Position Received: Up in chair, Alarm off, not on at start of session  Family/Caregiver Present: Yes  Caregiver Feedback: Spouse present at bedside     Precautions:  LE Weight Bearing Status:  (WBAT R LE)    Pain:  Pain Assessment  Pain Assessment: 0-10  0-10 (Numeric) Pain Score:  (not rated, complain of R LE pain, and  discomfort around the ACE band R knee)  Pain Type: Acute pain  Pain Location: Knee  Pain Orientation: Right  Pain Interventions: Repositioned      Objective   Cognition:  Overall Cognitive Status: Impaired at baseline  Orientation Level: Disoriented to time, Disoriented to situation             Home Living:  Type of Home: Condo  Lives With: Spouse  Home Adaptive Equipment: Cane  Home Layout: One level  Home Access: Elevator  Bathroom Shower/Tub: Tub/shower unit, Walk-in shower  Bathroom Equipment: None     Prior Function:  Level of [place]: Independent with ADLs and functional transfers  ADL Assistance: Independent  Ambulatory Assistance: Independent  Prior Function Comments: 3 falls in the past 3 weeks.    ADL:  Grooming Assistance: Stand by  Grooming Deficit: Setup (anticipate)  Toileting Assistance with Device: Minimal  Toileting Deficit:  (anticipate)    Activity Tolerance:  Endurance: Endurance does not limit participation in activity    Balance:  Dynamic Sitting Balance  Dynamic Sitting-Balance Support: Bilateral upper extremity supported  Dynamic Sitting-Level of Assistance: Distant supervision  Dynamic Sitting-Balance:  (scooting foward to the side of the bed.)  Dynamic Standing Balance  Dynamic Standing-Balance Support: Bilateral upper extremity supported  Dynamic Standing-Level of Assistance: Contact guard  Dynamic Standing-Balance:  (using a wheeled walker)  Static Sitting Balance  Static Sitting-Balance Support: No upper extremity supported  Static Sitting-Level of Assistance: Distant supervision  Static Standing Balance  Static Standing-Balance Support: Bilateral upper extremity supported  Static Standing-Level of Assistance: Contact guard    Bed Mobility/Transfers: Bed Mobility  Bed Mobility: Yes  Bed Mobility 1  Bed Mobility 1: Supine to sitting  Level of Assistance 1: Contact guard, Minimal verbal cues  Bed Mobility Comments 1: HOB slightly elevated   and      Vision: Vision - Basic  Assessment  Current Vision: No visual deficits   and Vision - Complex Assessment  Ocular Range of Motion: Within Functional Limits    Sensation:  Light Touch: No apparent deficits    Coordination:  Movements are Fluid and Coordinated: Yes     Hand Function:  Hand Function  Gross Grasp: Functional  Coordination: Functional    Extremities: RUE   RUE : Within Functional Limits, LUE   LUE: Within Functional Limits,  , and      Outcome Measures: [facility] Daily Activity  Putting on and taking off regular lower body clothing: A lot  Bathing (including washing, rinsing, drying): A lot  Putting on and taking off regular upper body clothing: A little  Toileting, which includes using toilet, bedpan or urinal: A little  Taking care of personal grooming such as brushing teeth: A little  Eating Meals: A little  Daily Activity - Total Score: 16         ,     OT Adult Other Outcome Measures  4AT: negative    Education Documentation  Body Mechanics, taught by Marcellus Kaye OT at 8/20/2024 12:34 PM.  Learner: Family, Patient  Readiness: Acceptance  Method: Explanation  Response: Verbalizes Understanding    ADL Training, taught by Marcellus Kaye OT at 8/20/2024 12:34 PM.  Learner: Family, Patient  Readiness: Acceptance  Method: Explanation  Response: Verbalizes Understanding    Education Comments  No comments found.        Goals:   Encounter Problems       Encounter Problems (Active)       ADLs       Patient will perform UB and LB bathing  with modified independent level of assistance and grab bars. (Progressing)       Start:  08/20/24    Expected End:  09/10/24            Patient with complete lower body dressing with modified independent level of assistance donning and doffing all LE clothes  with PRN adaptive equipment while edge of bed  (Progressing)       Start:  08/20/24    Expected End:  09/10/24            Patient will complete toileting including hygiene clothing management/hygiene with modified independent level of  assistance and grab bars. (Progressing)       Start:  08/20/24    Expected End:  09/10/24               BALANCE       Pt will maintain dynamic standing balance during ADL task with modified independent level of assistance in order to demonstrate decreased risk of falling and improved postural control. (Progressing)       Start:  08/20/24    Expected End:  09/10/24               MOBILITY       Patient will perform Functional mobility min Household distances/Community Distances with modified independent level of assistance and least restrictive device in order to improve safety and functional mobility. (Progressing)       Start:  08/20/24    Expected End:  09/10/24               TRANSFERS       Patient will perform bed mobility modified independent level of assistance and bed rails in order to improve safety and independence with mobility (Progressing)       Start:  08/20/24    Expected End:  09/10/24            Patient will complete sit to stand transfer with modified independent level of assistance and least restrictive device in order to improve safety and prepare for out of bed mobility. (Progressing)       Start:  08/20/24    Expected End:  09/10/24                   Treatment Completed on Evaluation    Activities of Daily Living:       UE Dressing  UE Dressing Level of Assistance: Minimum assistance  UE Dressing Where Assessed: Bed level  UE Dressing Comments: doffing and donning a gown.  LE Dressing  LE Dressing: Yes  Sock Level of Assistance: Maximum assistance, Minimal verbal cues  LE Dressing Where Assessed: Bed level            08/20/24 at 12:35 PM   Marcellus VILLATORO/L, OTYOEL  Rehab Office: [phone]

## 2024-08-20 NOTE — PROGRESS NOTES
Gal Cramer is a 84 y.o. male on day 1 of admission presenting with Fall (on) (from) other stairs and steps, initial encounter.      Transitional Care Coordination Progress Note:   PT/OT shey completed patient rec moderate therapy.  TCC met with patient and spouse at bedside to discuss discharge plans agreeable to SNF patient 1st and only choice [facility].  Referral sent awaiting acceptance.  TCC will continue to follow and update the plan as warranted    Tessa Quintero RN

## 2024-08-21 VITALS
WEIGHT: 180.78 LBS | RESPIRATION RATE: 18 BRPM | TEMPERATURE: 98.1 F | SYSTOLIC BLOOD PRESSURE: 142 MMHG | OXYGEN SATURATION: 98 % | HEIGHT: 69 IN | HEART RATE: 91 BPM | BODY MASS INDEX: 26.78 KG/M2 | DIASTOLIC BLOOD PRESSURE: 71 MMHG

## 2024-08-21 LAB
ALBUMIN SERPL BCP-MCNC: 3.4 G/DL (ref 3.4–5)
ANION GAP SERPL CALC-SCNC: 11 MMOL/L (ref 10–20)
BUN SERPL-MCNC: 33 MG/DL (ref 6–23)
CALCIUM SERPL-MCNC: 8.6 MG/DL (ref 8.6–10.6)
CHLORIDE SERPL-SCNC: 105 MMOL/L (ref 98–107)
CO2 SERPL-SCNC: 28 MMOL/L (ref 21–32)
CREAT SERPL-MCNC: 1.71 MG/DL (ref 0.5–1.3)
EGFRCR SERPLBLD CKD-EPI 2021: 39 ML/MIN/1.73M*2
ERYTHROCYTE [DISTWIDTH] IN BLOOD BY AUTOMATED COUNT: 12.5 % (ref 11.5–14.5)
GLUCOSE SERPL-MCNC: 90 MG/DL (ref 74–99)
HCT VFR BLD AUTO: 25 % (ref 41–52)
HGB BLD-MCNC: 9.2 G/DL (ref 13.5–17.5)
MAGNESIUM SERPL-MCNC: 1.86 MG/DL (ref 1.6–2.4)
MCH RBC QN AUTO: 35.5 PG (ref 26–34)
MCHC RBC AUTO-ENTMCNC: 36.8 G/DL (ref 32–36)
MCV RBC AUTO: 97 FL (ref 80–100)
NRBC BLD-RTO: 0 /100 WBCS (ref 0–0)
PHOSPHATE SERPL-MCNC: 3.3 MG/DL (ref 2.5–4.9)
PLATELET # BLD AUTO: 148 X10*3/UL (ref 150–450)
POTASSIUM SERPL-SCNC: 4.2 MMOL/L (ref 3.5–5.3)
RBC # BLD AUTO: 2.59 X10*6/UL (ref 4.5–5.9)
SARS-COV-2 RNA RESP QL NAA+PROBE: NOT DETECTED
SODIUM SERPL-SCNC: 140 MMOL/L (ref 136–145)
WBC # BLD AUTO: 5.3 X10*3/UL (ref 4.4–11.3)

## 2024-08-21 PROCEDURE — 2500000001 HC RX 250 WO HCPCS SELF ADMINISTERED DRUGS (ALT 637 FOR MEDICARE OP)

## 2024-08-21 PROCEDURE — 36415 COLL VENOUS BLD VENIPUNCTURE: CPT

## 2024-08-21 PROCEDURE — 2500000002 HC RX 250 W HCPCS SELF ADMINISTERED DRUGS (ALT 637 FOR MEDICARE OP, ALT 636 FOR OP/ED)

## 2024-08-21 PROCEDURE — 51701 INSERT BLADDER CATHETER: CPT

## 2024-08-21 PROCEDURE — 97110 THERAPEUTIC EXERCISES: CPT | Mod: GP,CQ

## 2024-08-21 PROCEDURE — 80069 RENAL FUNCTION PANEL: CPT

## 2024-08-21 PROCEDURE — 83735 ASSAY OF MAGNESIUM: CPT

## 2024-08-21 PROCEDURE — 87635 SARS-COV-2 COVID-19 AMP PRB: CPT

## 2024-08-21 PROCEDURE — 2500000004 HC RX 250 GENERAL PHARMACY W/ HCPCS (ALT 636 FOR OP/ED)

## 2024-08-21 PROCEDURE — 85027 COMPLETE CBC AUTOMATED: CPT

## 2024-08-21 PROCEDURE — 99239 HOSP IP/OBS DSCHRG MGMT >30: CPT | Performed by: STUDENT IN AN ORGANIZED HEALTH CARE EDUCATION/TRAINING PROGRAM

## 2024-08-21 PROCEDURE — 97116 GAIT TRAINING THERAPY: CPT | Mod: GP,CQ

## 2024-08-21 RX ORDER — ASPIRIN 81 MG/1
81 TABLET ORAL 2 TIMES DAILY
Start: 2024-08-21

## 2024-08-21 RX ORDER — ASPIRIN 81 MG/1
81 TABLET ORAL 2 TIMES DAILY
Qty: 60 TABLET | Refills: 0 | Status: CANCELLED | OUTPATIENT
Start: 2024-08-20 | End: 2024-09-19

## 2024-08-21 RX ORDER — ESCITALOPRAM OXALATE 10 MG/1
10 TABLET ORAL DAILY
Start: 2024-08-21

## 2024-08-21 RX ORDER — MIRTAZAPINE 15 MG/1
15 TABLET, FILM COATED ORAL NIGHTLY
Start: 2024-08-21

## 2024-08-21 RX ORDER — ACETAMINOPHEN 325 MG/1
650 TABLET ORAL EVERY 6 HOURS PRN
Start: 2024-08-21 | End: 2024-09-20

## 2024-08-21 RX ORDER — MEMANTINE HYDROCHLORIDE 10 MG/1
10 TABLET ORAL DAILY
Start: 2024-08-22

## 2024-08-21 RX ORDER — CHOLECALCIFEROL (VITAMIN D3) 50 MCG
2000 TABLET ORAL DAILY
Qty: 30 TABLET | Refills: 11 | Status: CANCELLED | OUTPATIENT
Start: 2024-08-21

## 2024-08-21 RX ORDER — OXYCODONE HYDROCHLORIDE 5 MG/1
5 TABLET ORAL EVERY 6 HOURS PRN
Qty: 15 TABLET | Refills: 0 | Status: CANCELLED | OUTPATIENT
Start: 2024-08-21 | End: 2024-08-28

## 2024-08-21 RX ORDER — ONDANSETRON 4 MG/1
4 TABLET, ORALLY DISINTEGRATING ORAL EVERY 8 HOURS PRN
Start: 2024-08-21

## 2024-08-21 RX ORDER — POLYETHYLENE GLYCOL 3350 17 G/17G
17 POWDER, FOR SOLUTION ORAL DAILY
Start: 2024-08-22

## 2024-08-21 RX ADMIN — POLYETHYLENE GLYCOL 3350 17 G: 17 POWDER, FOR SOLUTION ORAL at 08:04

## 2024-08-21 RX ADMIN — ESCITALOPRAM 10 MG: 20 TABLET, FILM COATED ORAL at 08:05

## 2024-08-21 RX ADMIN — PANTOPRAZOLE SODIUM 20 MG: 20 TABLET, DELAYED RELEASE ORAL at 07:02

## 2024-08-21 RX ADMIN — LISINOPRIL 20 MG: 20 TABLET ORAL at 08:06

## 2024-08-21 RX ADMIN — ASPIRIN 81 MG: 81 TABLET, COATED ORAL at 08:04

## 2024-08-21 RX ADMIN — SENNOSIDES 17.2 MG: 8.6 TABLET, FILM COATED ORAL at 08:04

## 2024-08-21 RX ADMIN — DONEPEZIL HYDROCHLORIDE 10 MG: 10 TABLET, FILM COATED ORAL at 08:14

## 2024-08-21 RX ADMIN — ACETAMINOPHEN 975 MG: 325 TABLET ORAL at 08:05

## 2024-08-21 RX ADMIN — Medication 2000 UNITS: at 07:00

## 2024-08-21 RX ADMIN — MEMANTINE 10 MG: 10 TABLET ORAL at 08:14

## 2024-08-21 RX ADMIN — FINASTERIDE 5 MG: 5 TABLET, FILM COATED ORAL at 08:14

## 2024-08-21 ASSESSMENT — COGNITIVE AND FUNCTIONAL STATUS - GENERAL
DRESSING REGULAR UPPER BODY CLOTHING: A LITTLE
CLIMB 3 TO 5 STEPS WITH RAILING: A LOT
MOBILITY SCORE: 20
DRESSING REGULAR LOWER BODY CLOTHING: A LITTLE
MOVING FROM LYING ON BACK TO SITTING ON SIDE OF FLAT BED WITH BEDRAILS: A LITTLE
STANDING UP FROM CHAIR USING ARMS: A LITTLE
MOBILITY SCORE: 17
TURNING FROM BACK TO SIDE WHILE IN FLAT BAD: A LITTLE
WALKING IN HOSPITAL ROOM: A LITTLE
DAILY ACTIVITIY SCORE: 20
CLIMB 3 TO 5 STEPS WITH RAILING: A LOT
TOILETING: A LITTLE
HELP NEEDED FOR BATHING: A LITTLE
STANDING UP FROM CHAIR USING ARMS: A LITTLE
MOVING TO AND FROM BED TO CHAIR: A LITTLE
WALKING IN HOSPITAL ROOM: A LITTLE

## 2024-08-21 ASSESSMENT — PAIN SCALES - GENERAL
PAINLEVEL_OUTOF10: 0 - NO PAIN

## 2024-08-21 ASSESSMENT — PAIN - FUNCTIONAL ASSESSMENT
PAIN_FUNCTIONAL_ASSESSMENT: 0-10
PAIN_FUNCTIONAL_ASSESSMENT: 0-10

## 2024-08-21 NOTE — CARE PLAN
Problem: Fall/Injury  Goal: Not fall by end of shift  Outcome: Progressing  Goal: Be free from injury by end of the shift  Outcome: Progressing  Goal: Verbalize understanding of personal risk factors for fall in the hospital  Outcome: Progressing  Goal: Verbalize understanding of risk factor reduction measures to prevent injury from fall in the home  Outcome: Progressing  Goal: Use assistive devices by end of the shift  Outcome: Progressing  Goal: Pace activities to prevent fatigue by end of the shift  Outcome: Progressing     Problem: Pain - Adult  Goal: Verbalizes/displays adequate comfort level or baseline comfort level  Outcome: Progressing     Problem: Skin  Goal: Decreased wound size/increased tissue granulation at next dressing change  Outcome: Progressing  Goal: Promote/optimize nutrition  Outcome: Progressing  Goal: Promote skin healing  Outcome: Progressing   The patient's goals for the shift include      The clinical goals for the shift include pt will remain safe throughout the shift

## 2024-08-21 NOTE — PROGRESS NOTES
"Orthopaedic Surgery Progress Note    PROCEDURE: Right lower extremity hematoma evacuation   DOS: 8/19/24  PRIMARY: Medicine     SUBJECTIVE:  No acute events overnight. AFVSS. Pain well controlled. Denies CP, SOB, N/T, N/V.    OBJECTIVE:  /87   Pulse 68   Temp 36.6 °C (97.9 °F)   Resp 18   Ht 1.75 m (5' 8.9\")   Wt 82 kg (180 lb 12.4 oz)   SpO2 98%   BMI 26.78 kg/m²     PHYSICAL EXAM  Gen: NAD  HEENT: normocephalic atraumatic  Psych: appropriate mood and affect  Resp: nonlabored breathing    Cardiac: Extremities WWP    Neuro: Motor and sensory grossly in tact.    Skin: no rashes    MSK:  Right Lower Extremity:   - Compressive ACE, Mepilex in place (remove POD7 8/26/24)   - HV drain  -Tender at incisional sites   -Fires EHL/TA/Gastroc   -SILT in sural, saphenous, superficial/deep peroneal, tibial nerve distributions  -Foot warm, well perfused  -Palpable DP pulse   -Compartments soft and compressible     ASSESSMENT: 4 y.o. male (dementia, HTN, HLD, and BPH) p/a fall down 1 stair with R thigh hematoma. Denies thinners. Exam w/anteromedial hematoma and ruptured pressure blister, NVI. Compartments soft. Ligamentous exam stable. XR/CT w/o osseous or intra-articular hemarthrosis. Aspiration attempted w/ 5 cc bloody fluid. Now s/p RLE hematoma evacuation w/ Dr. Schmitz on 8/19/24.     Injuries:   - RLE traumatic hematoma      PLAN:   - Appreciate excellent care per Medicine   - WB: WBAT RLE    - Abx: Perioperative Ancef 2g q8h x 3 doses   - Diet: Per primary, okay to advance as tolerated from an ortho perspective   - DVT: Per primary, recommend SCDs and ASA 81mg BID   - Dressing(s): Remove Mepliex dressing(s) POD7 (8/26/24)   - [place]: None   - Drain: Removed today    Recommended Discharge Medications:   - Oxycodone immediate release tablet 5mg q6h x 7d   - Acetaminophen 650mg q6h x 30d   - Asprin 81mg BID x 30d   - Calcium carbonate-vitamin D tablet 500mg-200units every day    - Zofran-ODT 4mg q8h x 30d   - " Miralax 17g packet every day x 30d        DISPOSITION: Per primary     This patient was seen and staffed with the attending on call, Dr. Jose Schmitz .    We will follow peripherally while patient is in house. Pt should be WBAT until first follow up appointment. Patient currently has ACE wrap on surgical site. Dressing should be removed POD7. Patient should follow up w/ Dr. Schmitz 3 weeks after surgery for post-operative appointment (patient may call [phone] to schedule). Please page with questions.

## 2024-08-21 NOTE — CARE PLAN
The patient's goals for the shift include      The clinical goals for the shift include hds this shift, safety    Pt plans to discharge today to [place], pending covid test result    Problem: Fall/Injury  Goal: Not fall by end of shift  Outcome: Progressing  Goal: Be free from injury by end of the shift  Outcome: Progressing  Goal: Verbalize understanding of personal risk factors for fall in the hospital  Outcome: Progressing  Goal: Verbalize understanding of risk factor reduction measures to prevent injury from fall in the home  Outcome: Progressing  Goal: Use assistive devices by end of the shift  Outcome: Progressing  Goal: Pace activities to prevent fatigue by end of the shift  Outcome: Progressing     Problem: Pain - Adult  Goal: Verbalizes/displays adequate comfort level or baseline comfort level  Outcome: Progressing     Problem: Safety - Adult  Goal: Free from fall injury  Outcome: Progressing     Problem: Discharge Planning  Goal: Discharge to home or other facility with appropriate resources  Outcome: Progressing     Problem: Chronic Conditions and Co-morbidities  Goal: Patient's chronic conditions and co-morbidity symptoms are monitored and maintained or improved  Outcome: Progressing     Problem: Skin  Goal: Decreased wound size/increased tissue granulation at next dressing change  Outcome: Progressing  Goal: Promote/optimize nutrition  Outcome: Progressing  Goal: Promote skin healing  Outcome: Progressing

## 2024-08-21 NOTE — DISCHARGE SUMMARY
Discharge Diagnosis  Fall (on) (from) other stairs and steps, initial encounter    Issues Requiring Follow-Up  Remove Mepliex dressing(s) POD7 (8/26/24)   Orthopedic surgery outpatient follow up appointment in 2 weeks (Dr. Jose Schmitz)    Discharge Meds     Your medication list        START taking these medications        Instructions Last Dose Given Next Dose Due   calcium carbonate-vitamin D3 500 mg-5 mcg (200 unit) tablet      Take 2 tablets by mouth once daily.              ASK your doctor about these medications        Instructions Last Dose Given Next Dose Due   aspirin 81 mg EC tablet           cholecalciferol 50 mcg (2,000 unit) capsule  Commonly known as: Vitamin D-3           donepezil 10 mg tablet  Commonly known as: Aricept           finasteride 5 mg tablet  Commonly known as: Proscar           lisinopril 20 mg tablet           memantine 10 mg tablet  Commonly known as: Namenda           mirtazapine 15 mg tablet  Commonly known as: Remeron           omeprazole 10 mg DR capsule  Commonly known as: PriLOSEC           pravastatin 10 mg tablet  Commonly known as: Pravachol                     Where to Get Your Medications        These medications were sent to [facility] - [facility] [address]  [address] [place] [address]      Phone: [phone]   calcium carbonate-vitamin D3 500 mg-5 mcg (200 unit) tablet         Test Results Pending At Discharge  Pending Labs       No current pending labs.            Hospital Course  Gal Cramer is a 84 y.o. male with a hx of dementia, HTN, HLD, GERD, peripheral neuropathy, and BPH (self-cath 3-4x every day) admitted following unprovoked mechanical fall down 1 step resulting in R Knee hematoma which rapidly increased in size with a hemorrhagic bullae on the anteromedial portion of the R knee, causing lateralization of the patella. He did not endorse much pain over the area, and had some bruising both proximal and  distal to the blister. Exam also notable for bruising on his right chin. Imaging in the ED was unremarkable except for soft tissue swelling indicative of hematoma on CT R knee. CT head, CT maxillofacial bone, CT C spine, XR knee, XR tibia fibula, XR femur, and XR hip and pelvis all unremarkable for acute abnormalities. Gait exam notable for some unsteadiness, and patient has had history of 3 falls in the past month. Labs notable for VEDA on CKD with Cr 1.73>2.06 after fluids (baseline 1.5), as well as asymptomatic bacteriuria on urinalysis (leuk esterase 500+; WBC 21-50). Patient straight caths himself at baseline, as was having some minor bleeding when doing this in hospital. Patient evaluated by orthopedic surgery for hematoma management, and taken for I&D of hematoma on 8/19. Postoperatively, patient was doing well and endorsed minimal pain. Anticoagulation was restarted with ASA 81 BID on POD1, and hemovac drain removed on 8/21.    Throughout admission, exam was also notable for dementia, with poor memory and recall of events leading up to the fall. This seemed to be at his baseline. Patient depends on  for help with memory and medical management.    Follow Up:  Remove Mepliex dressing(s) POD7 (8/26/24)   Orthopedic surgery outpatient follow up appointment in 2 weeks (Dr. Jose Schmitz)    Pertinent Physical Exam At Time of Discharge  Physical Exam  Constitutional:       General: He is not in acute distress.     Appearance: Normal appearance. He is normal weight.   HENT:      Head: Normocephalic and atraumatic.      Right Ear: External ear normal.      Left Ear: External ear normal.      Nose: Nose normal.      Mouth/Throat:      Mouth: Mucous membranes are moist.   Eyes:      General: No scleral icterus.     Extraocular Movements: Extraocular movements intact.      Conjunctiva/sclera: Conjunctivae normal.      Pupils: Pupils are equal, round, and reactive to light.   Cardiovascular:      Rate and Rhythm:  Normal rate and regular rhythm.      Pulses: Normal pulses.      Heart sounds: Normal heart sounds. No murmur heard.     No friction rub. No gallop.   Pulmonary:      Effort: Pulmonary effort is normal. No respiratory distress.      Breath sounds: Normal breath sounds. No wheezing.   Abdominal:      General: Abdomen is flat. Bowel sounds are normal. There is no distension.      Palpations: Abdomen is soft. There is no mass.      Tenderness: There is no abdominal tenderness.   Musculoskeletal:         General: Swelling, tenderness (mild) and signs of injury present. No deformity. Normal range of motion.      Cervical back: Normal range of motion.      Right lower leg: No edema.      Left lower leg: No edema.      Comments: RLE edema. R. Knee with large medial hematoma with hemorrhagic blister on admission s.p rupture. Additional small skin scraping on R knee below injury. Surrounding bruising both proximally and distally. R leg 2+ edema/swelling. No pain   Skin:     General: Skin is warm and dry.      Capillary Refill: Capillary refill takes less than 2 seconds.   Neurological:      General: No focal deficit present.      Mental Status: He is alert and oriented to person, place, and time. Mental status is at baseline.      Sensory: No sensory deficit.   Psychiatric:         Mood and Affect: Mood normal.         Behavior: Behavior normal.         Thought Content: Thought content normal.     Outpatient Follow-Up  Future Appointments   Date Time Provider Department Center   9/6/2024 11:45 AM Mel Crowell PA-C [id number], MS4      I, or a resident under my supervision, was present with the medical student who participated in the documentation of this note.  I have personally seen and examined the patient and performed the medical decision-making components. I have reviewed the medical student documentation and/or resident documentation and verified the findings in the note as written  with additions or exceptions as stated in the body of the note.    >30 minutes were spent on discharge coordination and planning.

## 2024-08-21 NOTE — PROGRESS NOTES
Physical Therapy    Physical Therapy Treatment    Patient Name: Gal Cramer  MRN: [id number]  Today's Date: 8/21/2024  Time Calculation  Start Time: 0933  Stop Time: 0957  Time Calculation (min): 24 min    Assessment/Plan   PT Assessment  End of Session Communication: Bedside nurse  Assessment Comment: Patient is a 83yo M presenting with mechanical fall resulting in R. Knee hematoma. S/p ortho surgery I&D of R knee hematoma on 8/19/24. Patient lives with supportive spouse and tolerated session well. Pt is below baseline and required FWW for ambulation. dc rec mod  End of Session Patient Position: Up in chair, Alarm on     PT Plan  Treatment/Interventions: Bed mobility, Transfer training, Gait training, Balance training, Strengthening, Neuromuscular re-education, Endurance training, Therapeutic exercise, Range of motion, Therapeutic activity  PT Plan: Ongoing PT  PT Frequency: 5 times per week  PT Discharge Recommendations: Moderate intensity level of continued care  Equipment Recommended upon Discharge: Wheeled walker  PT Recommended Transfer Status: Assistive device  PT - OK to Discharge: Yes (indicates PT eval complete and dc rec determined)      General Visit Information:   PT  Visit  PT Received On: 08/21/24  Response to Previous Treatment: Patient with no complaints from previous session.  General  Reason for Referral: admitted following unprovoked mechanical fall resulting in R. Knee hematoma. S/p ortho surgery I&D of R knee hematoma on 8/19/24.  Past Medical History Relevant to Rehab: dementia, HTN, HLD, GERD, peripheral neuropathy, and BPH (self-cath 3-4x every day)  Prior to Session Communication: Bedside nurse  Patient Position Received: Up in chair, Alarm on  Preferred Learning Style: verbal  General Comment: pt pleasant and cooperative    Subjective   Precautions:  Precautions  LE Weight Bearing Status: Weight Bearing as Tolerated     Objective   Pain:  Pain Assessment  Pain Assessment: 0-10  0-10  (Numeric) Pain Score: 0 - No pain (pt did not rate pain however, noted tightness in R knee)  Cognition:  Cognition  Overall Cognitive Status: Impaired  Orientation Level: Disoriented to time (pt stating he normally learns the date each day when he is reading the morning paper.)     Postural Control:  Static Sitting Balance  Static Sitting-Balance Support: Feet supported  Static Sitting-Level of Assistance: Close supervision  Dynamic Sitting Balance  Dynamic Sitting-Balance Support: Feet supported  Dynamic Sitting-Level of Assistance: Close supervision (mayra cota)  Dynamic Sitting-Balance: Lateral lean  Static Standing Balance  Static Standing-Balance Support: Bilateral upper extremity supported (FWW)  Static Standing-Level of Assistance: Contact guard  Dynamic Standing Balance  Dynamic Standing-Balance Support: Bilateral upper extremity supported (FWW)  Dynamic Standing-Level of Assistance: Contact guard  Dynamic Standing-Balance: Turning    Activity Tolerance:  Activity Tolerance  Endurance: Endurance does not limit participation in activity  Treatments:  Therapeutic Exercise  Therapeutic Exercise Performed: Yes  Therapeutic Exercise Activity 1: Seated AP, LAQ, Hip Flexion: AROM x 12 reps x 2 sets     Ambulation/Gait Training  Ambulation/Gait Training Performed: Yes  Ambulation/Gait Training 1  Surface 1: Level tile  Device 1: Rolling walker  Assistance 1: Contact guard  Quality of Gait 1: Decreased step length  Comments/Distance (ft) 1: 100' x 2 (brief standing rest period)  Transfers  Transfer: Yes  Transfer 1  Transfer From 1: Sit to, Stand to  Transfer to 1: Stand, Sit  Technique 1: Sit to stand, Stand to sit  Transfer Device 1: Walker  Transfer Level of Assistance 1: Contact guard    Outcome Measures:  [facility] Basic Mobility  Turning from your back to your side while in a flat bed without using bedrails: A little  Moving from lying on your back to sitting on the side of a flat bed without using bedrails: A  little  Moving to and from bed to chair (including a wheelchair): A little  Standing up from a chair using your arms (e.g. wheelchair or bedside chair): A little  To walk in hospital room: A little  Climbing 3-5 steps with railing: A lot  Basic Mobility - Total Score: 17    Education Documentation  Precautions, taught by Myke Goyal PTA at 8/21/2024 10:14 AM.  Learner: Patient  Readiness: Acceptance  Method: Explanation  Response: Verbalizes Understanding    Mobility Training, taught by Myke Goyal PTA at 8/21/2024 10:14 AM.  Learner: Patient  Readiness: Acceptance  Method: Explanation  Response: Verbalizes Understanding    Education Comments  No comments found.           Encounter Problems       Encounter Problems (Active)       Mobility       STG - Patient will ambulate > 300' LRAD modif indep  (Progressing)       Start:  08/20/24    Expected End:  09/10/24               PT Transfers       STG - Patient will perform bed mobility indep  (Progressing)       Start:  08/20/24    Expected End:  09/10/24            STG - Patient will transfer sit to and from stand modif indep LRAD (Progressing)       Start:  08/20/24    Expected End:  09/10/24               Pain - Adult

## 2024-08-21 NOTE — NURSING NOTE
Patient discharged from [place] [address] to [facility] via [facility] Stretcher.  Patient's IV removed from right hand prior to discharge- catheter tip intact. Previous RN attempted to call report twice with no answer. Patient left the floor alert and oriented times four, on room air, and no complaints of pain.

## 2024-08-22 NOTE — PROGRESS NOTES
"Reason for admission to [facility] for post-acute rehab recurrent falls and right knee hematoma    Subjective   Mr. Cramer 84 y.o. year old male was admitted to [facility] unit on 8/21. Patient is seen for the initial SNF visit on 8/23. He has a past medical history of dementia, HTN, CKD, HLD, GERD, peripheral neuropathy, and BPH (self-cath 3-4x every day) .    Admitted from: acute care hospital  Facility: [facility]   Dates of hospitalization: 8/17-8/21/24  Hospital Course:  \"admitted following unprovoked mechanical fall down 1 step resulting in R Knee hematoma which rapidly increased in size with a hemorrhagic bullae on the anteromedial portion of the R knee, causing lateralization of the patella. He did not endorse much pain over the area, and had some bruising both proximal and distal to the blister. Exam also notable for bruising on his right chin. Imaging in the ED was unremarkable except for soft tissue swelling indicative of hematoma on CT R knee. CT head, CT maxillofacial bone, CT C spine, XR knee, XR tibia fibula, XR femur, and XR hip and pelvis all unremarkable for acute abnormalities. Gait exam notable for some unsteadiness, and patient has had history of 3 falls in the past month. Labs notable for VEDA on CKD with Cr 1.73>2.06 after fluids (baseline 1.5), as well as asymptomatic bacteriuria on urinalysis (leuk esterase 500+; WBC 21-50). Patient straight caths himself at baseline, as was having some minor bleeding when doing this in hospital. Patient evaluated by orthopedic surgery for hematoma management, and taken for I&D of hematoma on 8/19. Postoperatively, patient was doing well and endorsed minimal pain. Anticoagulation was restarted with ASA 81 BID on POD1, and hemovac drain removed on 8/21.  Throughout admission, exam was also notable for dementia, with poor memory and recall of events leading up to the fall. This seemed to be at his baseline. Patient depends on " " for help with memory and medical management.  Follow Up:  Remove Mepliex dressing(s) POD7 (8/26/24)   Orthopedic surgery outpatient follow up appointment in 2 weeks (Dr. Jose Schmitz)\"    Primary caregiver: patient and   : Wes Cavazos. [phone]    PCP: Fitz Power    Neuropsychological testing on October 2023: \"Mr. Cramer meets criteria for dementia of a mild to moderate degree. The pattern of his profile continues to suggest primary mesial-temporal dysfunction though with additional, more diffuse deficits potentially implicating frontal-subcortical networks. His cognitive profile, cognitive and functional decline over time, and progression of atrophy on neuroimaging suggests a neurodegenerative condition, most likely of the Alzheimer's type. Additional vascular contribution is also likely\"     Saw Dr. Banks from geriatrics at [facility] last on 6/18/24  \"1) Mild cognitive Impairment:  Slight serial memory decline  Slight serial functional decline   PRIOR visit MOCA/MMSE scored 20/30 with deficits in the delayed recall and visuospatial area.   Of note MMSE score in October 2023 was 22/30   Repeat MRI brain with volumetric scans In August 2023 with Hippocampus at 4th percentile   On Donepezil but Neurology has advised for this to be taken at night and increased Donepezil dose to 10mg and in addition Memantine has been added at 10mg PO BID  Serologies for Vitamin B12, TFTs, Vitamin D pending as kindly advised by PCP  Not clear what the evidence is for Donepezil and Memantine together - Advised to ask Neurology re: this   LAST visit advised to switch Donepezil to day time which Mr. Cramer has done and also taking Memantine 10mg one tablet at night   Will keep memory medications the dsame for now while we address sleep and appetite improvements as below  Neurology has also advised PET scan - Rationale unclear and family does not wish to proceed with this  Interested in HHA at home - Has " "services via [employer] who helped while partner recently had surgery  2) Insomnia : Sleep currently improved but on Quetiapine (as advised by Neurology)   Discussed potential adverse effects with Quetiapine including FDA black box warning of CVA risk   Melatonin did not help   Does have Trazodone at home  Advised to stop Quetiapine and change to Mirtazapine 7.5mg as kindly advised by PCP  Home sleep study Re-discussed today-xx  3) SNHL: Has hearing aids-Encouraged to use regularly for additional memory benefit   4) Weight loss: Current weight 175lbs with BMI of 24.45  Weight was 187 lbs in January 2024  Denies Depression  TFts pending  ? Role of Donepezil in weight loss   Mirtazapine 7.5mg qHS as above \"    HPI     Per patient:  - no pain except the knee is a little sore  - moving bowels  - no SOB or cp     Per   - his memory is not good. No improvement noted with donepezil or memantine  - weight has continued to go down. Was around 172 lbs before hospital stay  - the mirtazapine worked better for sleep than the quetiapine did   -  has been doing most of the iADls for awhile. Sets up his meds. Does \"cooking\", driving. Pt hasn't driving in 1-2 years. Wasn't getting lost.     Living environment prior to admission: condo with his . No stairs    Assistive Devices: straight cane      ADLS prior to admission   Is dependant or requires assistance in the following BADL: was hesitant to bathe at home. But was using a tub shower. And maybe was afraid of falling. Needs stand by assistance with bathing. Dresses on his own  Is dependant or requires assistance in the following Instrumental ADL: most    Advanced Directives on file: wishes to be DNR-Comfort care -arrest    Medications reviewed and reconciled.       Objective     Physical Exam  Vitals: 138/84, 109/73, 176/96, 97.6, 94, 20, 98%  Constitutional:  Well-nourished, kempt  Head: NC/AT  Eyes: no scleral injection or icterus    Dentition: fair    " Oropharyx: clear    Neck: supple. trachea midline. Thyroid not enlarged  Lymphatics: No adenopathy at neck  Respiratory: clear without rales, rhonchi or wheezes  Cardiovascular: RRR, +S1, S2, no murmurs appreciated    Extremeties:  right leg with 1-2+ pitting edema and yellowship discoloration of lower leg and bruising and yellowship discoloration of right upper leg. No clubbing, cyanosis  Gastrointestinal: +BS, soft, nontender  Genitourinary: no clark  Integumentary: No ulcers, pressure sores. Surgical dressing intact. Small puncture like wound with some active bleeding medial to the surgical dressing. Some eschar and open areas medial to the surgical incision and inferior.   Musculoskeletal: some kyphosis of spine    Contractures: none    Muscle bulk: nl    Digits/nails: nl    Effusions: none   Neurologic: alert but forgetful    Psychologic: affect full     Component  Ref Range & Units 1 d ago 2 d ago 3 d ago 4 d ago 5 d ago   Glucose  74 - 99 mg/dL 90 90 87 115 High  110 High    Sodium  136 - 145 mmol/L 140 144 143 141 139   Potassium  3.5 - 5.3 mmol/L 4.2 3.8 4.1 4.5 CM 3.9   Chloride  98 - 107 mmol/L 105 107 108 High  105 103   Bicarbonate  21 - 32 mmol/L 28 28 27 20 Low  20 Low    Anion Gap  10 - 20 mmol/L 11 13 12 21 High  20   Urea Nitrogen  6 - 23 mg/dL 33 High  31 High  32 High  29 High  26 High    Creatinine  0.50 - 1.30 mg/dL 1.71 High  1.80 High  1.96 High  2.06 High  1.73 High    eGFR  >60 mL/min/1.73m*2 39 Low  36 Low  CM 33 Low  CM 31 Low  CM 38 Low  CM   Comment: Calculations of estimated GFR are performed using the 2021 CKD-EPI Study Refit equation without the race variable for the IDMS-Traceable creatinine methods.  https://jasn.asnjournals.org/content/early/2021/09/22/ASN.4689906226   Calcium  8.6 - 10.6 mg/dL 8.6 8.7 8.9 9.6 9.8   Phosphorus  2.5 - 4.9 mg/dL 3.3 3.3 CM 3.0 CM 3.8 CM    Albumin  3.4 - 5.0 g/dL 3.4 3.5 3.7 4.2      Component  Ref Range & Units 1 d ago 2 d ago 3 d ago 4 d ago 5 d  ago   WBC  4.4 - 11.3 x10*3/uL 5.3 7.5 6.2 8.3 8.5   Hemoglobin  13.5 - 17.5 g/dL 9.2 Low  9.4 Low  10.2 Low  11.8 Low  12.3 Low    Hematocrit  41.0 - 52.0 % 25.0 Low  26.1 Low  27.5 Low  30.0 Low  32.2 Low    MCV  80 - 100 fL 97 98 99 90 92   RDW  11.5 - 14.5 % 12.5 12.4 12.8 12.0 12.3   Platelets  150 - 450 x10*3/uL 148 Low  157 162 150 183            Component  Ref Range & Units 1 d ago 2 d ago 3 d ago 4 d ago   Magnesium  1.60 - 2.40 mg/dL 1.86 1.94 2.00 1.78 CM        Component  Ref Range & Units 2 mo ago   Vitamin B12  232 - 1245 pg/mL 846     Component  Ref Range & Units 2 mo ago   Vitamin D 25 Hydroxy  31.0 - 80.0 ng/mL 39.6     Component  Ref Range & Units 2 mo ago   TSH  0.270 - 4.200 mIU/L 1.360     Component  Ref Range & Units 2 mo ago   Protein, Total  6.3 - 8.0 g/dL 6.6   Albumin  3.9 - 4.9 g/dL 3.9   Calcium, Total  8.5 - 10.2 mg/dL 9.1   Bilirubin, Total  0.2 - 1.3 mg/dL 0.7   Alkaline Phosphatase  38 - 113 U/L 50   AST  14 - 40 U/L 17   ALT  10 - 54 U/L 6 Low        CT head wo IV contrast 08/17/2024  CT maxillofacial bones wo IV contrast 08/17/2024  CT cervical spine wo IV contrast 08/17/2024  CT HEAD:  No hyperdense intracranial hemorrhage is identified. There is no mass  effect or midline shift.  Gray-white differentiation is intact, without evidence of CT apparent  transcortical infarct, although patchy attenuation changes are  present in the periventricular and subcortical white matter of  bilateral cerebral hemispheres, nonspecific findings favored to  represent sequela of microvascular disease.  No abnormal ventricular dilatation is present. Basal cisterns are  patent. No extra-axial fluid collections are identified.  Scalp soft tissues do not demonstrate any acute abnormality.  Calvarium is unremarkable without evidence of skull fracture.  Mastoid air cells and middle ear cavities are clear.  CT FACIAL BONES:  Bony orbits are intact, without evidence of fracture. Periorbital  soft tissues and  intraorbital structures are symmetric in appearance  without evidence of acute trauma. Patient is status post cataract  extraction bilaterally.  No acute facial bone fracture is identified. Nasal bones are  unremarkable in appearance.  Paranasal sinuses are well aerated without evidence of air-fluid  levels.  Some soft tissue swelling overlies the chin, without evidence of  underlying osseous injury. No associated fluid collection or soft  tissue gas is present.Although evaluation of the oral cavity is degraded by beam hardening artifact no definite evidence of acute trauma to the oral cavity is present. Temporomandibular joints are intact, with asymmetric  degenerative changes present in the right.  Large periapical lucency surrounds the roots of the 2nd right  maxillary molar.  Parotid and submandibular glands are symmetric in appearance and  otherwise unremarkable.  CT C-SPINE:  There is straightening of normal lordotic curvature of the cervical  spine, without evidence of significant spondylolisthesis.  Cervical vertebral body heights are preserved without evidence of  compression fractures, although some insufficiency endplate changes  with smaller Schmorl's nodes are present at several levels. There is  no evidence of acute trauma to the posterior elements of the cervical  spine.  Craniocervical junction is intact, although degenerative changes at  the atlantoaxial articulation with hypertrophic pannus extending into  the anterior epidural space of C1 contributing to mild spinal canal  narrowing at this level with the effacement of the anterior  subarachnoid space.  Facet joints are preserved without evidence of traumatic subluxation  or perching, although multilevel degenerate facet osteoarthropathy is  evident, most pronounced at C2-C3 and C3-C4 on the left.  Multilevel intervertebral disc height loss is present, moderate to  severe at C4-C5 and C5-C6 and mild at other levels.  No high-grade stenosis is  identified in the cervical spine, although  mild spinal canal narrowing is suspected at C3-C4, C4-C5 and C5-C6  due to disc osteophyte complexes and ligamentum flavum thickening.  Mild spinal canal narrowing is present at C3-C4 bilaterally and C4-C5  on the left due to uncovertebral and facet joint hypertrophy.  Prevertebral and paraspinal soft tissues do not demonstrate any acute  abnormalities. Included lung apices are clear.  Impression  CT HEAD:  1. No evidence of hemorrhage, skull fracture, or other acute  intracranial trauma/abnormality.  2. Patchy attenuation changes are present in the periventricular and  subcortical white matter of bilateral cerebral hemispheres,  nonspecific findings favored to represent sequela of microvascular  disease.  CT FACIAL BONES:  1. Soft tissue swelling overlies the chin, likely posttraumatic  without absence of the underlying osseous injury. No facial or  orbital bone fracture is present.  2. Large periapical lucency surrounds the roots of the 2nd right  mandibular molar, correlate with dental exam.  CT C-SPINE:  1. No evidence of acute trauma to the cervical spine.  2. Multilevel degenerative changes of the cervical spine are present,  with intervertebral disc height loss, facet osteoarthropathy, and  mild spinal canal and neural foraminal stenosis at several levels due  to disc osteophyte complexes, ligamentum flavum thickening and  hypertrophic facet changes.    Assessment/Plan    Right knee hematoma, Status post incision and drainage  2. Knee pain   3. R knee wound  4. Leg swelling  - fell at home down 1 step on 8/17. Found to have R knee hematoma causing lateralization of patella. Underwent I&D by ortho on 8/19.   - is on aspirin 81mg bID for DVT prophylaxis. However since didn't have full joint surgery and Is still having active bleeding from wound, will decrease to once daily.   - on tylenol 1000mg TID   - needs f/up with ortho -Dr. Schmitz in 2 weeks  - mepilex dressing  to be removed 8/26. Has vasoline gauze over abrasion on medial right knee  - has significant discoloration and leg swelling of thigh and lower leg due to the hematoma tracking within the skin tissue.   - wrap leg daily with ace bandages from base of toes to mid thigh.     5. Recurrent falls  6. Physical deconditioning   - falls are likely multifactorial due to cognitive impairment, medication, osteoarthritis    7. HTN  Is on lisinopril 20mg daily. Will continue. Monitor Bps    8. Dementia- mild CDR 1.0  - gets help from  with most adls. Needs stand by assistance with bathing, mainly due to risk for falls.   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 MRI in 8/2023 showed hippocampal volume at 4%. Follows with both geriatrics and neurology at [facility].  Is on donepezil 10mg daily. Also was on memantine 10mg BID though it was decreased to just at bedtime. Will continue these medications for now. However may wean off of donepezil if weight loss continues.      9. Weight loss  - down 12 lbs between 1/2024 and 6/2024. Has been on donepezil, which may have contributed to the weight loss at least in part. Was started on mirtazapine 7.5mg HS in 6/2024 to help with appetite and sleep. Now on 15mg daily.  notes that it has been helping with sleep. But still has been losing weight. Possibly down to 172 lbs prior to hospitalization. 180 lbs here. Will monitor weight weekly. Dietician following.     10. VEDA on CKD  - cr was up to 2.06 in hospital. Improved to 1.7 on 7/21. Baseline is around 1.5. will monitor kidney function. Will check renal panel on 8/26    11. Anemia  - hemoglobin decreased from 12 to 9.2 during hospitalization. Likely related to hematoma.     12. BPH with urination retention   - is on finasteride 5mg daily. And straight catheterizes himself with assistance 4 times daily. Will  continue and monitor for urinary retention     13. HL   - is on pravastatin 10mg TID. If he is on this for primary prevention, could consider stopping this    14. GERD  - on pantoprazole 20mg daily. Will continue for now though not clear he truly needs this. Could consider weaning off of this as an outpatient    15. Depression   - n escitalopram 10mg daily. Also on mirtazapine 15mg daily. Was started at 7.5mg HS in 6/2024 to replace quetiapine. Will continue these meds for now.     16. Constipation  - on miralax daily. Monitor bowels    17. Vitamin D deficiency  - D level was 39.6 in 6/2024. Is on D3 2000 units daily. Will continue    18. Goals of care counseling discussion   Wishes to be DNR-CC-A. [place] form completed today.     Disposition: Will discuss with pt, family, and interdisciplinary team throughout his rehab stay      Tami Marroquin MD

## 2024-08-23 ENCOUNTER — NURSING HOME VISIT (OUTPATIENT)
Dept: POST ACUTE CARE | Facility: EXTERNAL LOCATION | Age: 84
End: 2024-08-23
Payer: MEDICARE

## 2024-08-23 DIAGNOSIS — N40.1 BENIGN PROSTATIC HYPERPLASIA WITH INCOMPLETE BLADDER EMPTYING: ICD-10-CM

## 2024-08-23 DIAGNOSIS — S80.01XD TRAUMATIC HEMATOMA OF RIGHT KNEE, SUBSEQUENT ENCOUNTER: Primary | ICD-10-CM

## 2024-08-23 DIAGNOSIS — R29.6 RECURRENT FALLS: ICD-10-CM

## 2024-08-23 DIAGNOSIS — D64.9 ANEMIA, UNSPECIFIED TYPE: ICD-10-CM

## 2024-08-23 DIAGNOSIS — F03.918 DEMENTIA WITH BEHAVIORAL DISTURBANCE (MULTI): ICD-10-CM

## 2024-08-23 DIAGNOSIS — N18.9 ACUTE KIDNEY INJURY SUPERIMPOSED ON CKD (CMS-HCC): ICD-10-CM

## 2024-08-23 DIAGNOSIS — M25.561 ACUTE PAIN OF RIGHT KNEE: ICD-10-CM

## 2024-08-23 DIAGNOSIS — R63.4 WEIGHT LOSS: ICD-10-CM

## 2024-08-23 DIAGNOSIS — E78.5 HYPERLIPIDEMIA, UNSPECIFIED HYPERLIPIDEMIA TYPE: ICD-10-CM

## 2024-08-23 DIAGNOSIS — R53.81 PHYSICAL DECONDITIONING: ICD-10-CM

## 2024-08-23 DIAGNOSIS — E55.9 VITAMIN D DEFICIENCY: ICD-10-CM

## 2024-08-23 DIAGNOSIS — R39.14 BENIGN PROSTATIC HYPERPLASIA WITH INCOMPLETE BLADDER EMPTYING: ICD-10-CM

## 2024-08-23 DIAGNOSIS — F32.A DEPRESSION, UNSPECIFIED DEPRESSION TYPE: ICD-10-CM

## 2024-08-23 DIAGNOSIS — Z71.89 GOALS OF CARE, COUNSELING/DISCUSSION: ICD-10-CM

## 2024-08-23 DIAGNOSIS — I10 PRIMARY HYPERTENSION: ICD-10-CM

## 2024-08-23 DIAGNOSIS — N17.9 ACUTE KIDNEY INJURY SUPERIMPOSED ON CKD (CMS-HCC): ICD-10-CM

## 2024-08-23 PROCEDURE — 99306 1ST NF CARE HIGH MDM 50: CPT | Performed by: INTERNAL MEDICINE

## 2024-08-23 NOTE — LETTER
"Patient: Gal Cramer  : 1940    Encounter Date: 2024    Reason for admission to [facility] for post-acute rehab recurrent falls and right knee hematoma    Subjective   Mr. Cramer 84 y.o. year old male was admitted to [facility] unit on . Patient is seen for the initial SNF visit on . He has a past medical history of dementia, HTN, CKD, HLD, GERD, peripheral neuropathy, and BPH (self-cath 3-4x every day) .    Admitted from: acute care hospital  Facility: [facility]   Dates of hospitalization: -24  Hospital Course:  \"admitted following unprovoked mechanical fall down 1 step resulting in R Knee hematoma which rapidly increased in size with a hemorrhagic bullae on the anteromedial portion of the R knee, causing lateralization of the patella. He did not endorse much pain over the area, and had some bruising both proximal and distal to the blister. Exam also notable for bruising on his right chin. Imaging in the ED was unremarkable except for soft tissue swelling indicative of hematoma on CT R knee. CT head, CT maxillofacial bone, CT C spine, XR knee, XR tibia fibula, XR femur, and XR hip and pelvis all unremarkable for acute abnormalities. Gait exam notable for some unsteadiness, and patient has had history of 3 falls in the past month. Labs notable for VEDA on CKD with Cr 1.73>2.06 after fluids (baseline 1.5), as well as asymptomatic bacteriuria on urinalysis (leuk esterase 500+; WBC 21-50). Patient straight caths himself at baseline, as was having some minor bleeding when doing this in hospital. Patient evaluated by orthopedic surgery for hematoma management, and taken for I&D of hematoma on . Postoperatively, patient was doing well and endorsed minimal pain. Anticoagulation was restarted with ASA 81 BID on POD1, and hemovac drain removed on .  Throughout admission, exam was also notable for dementia, with poor memory and recall of events leading up " "to the fall. This seemed to be at his baseline. Patient depends on  for help with memory and medical management.  Follow Up:  Remove Mepliex dressing(s) POD7 (8/26/24)   Orthopedic surgery outpatient follow up appointment in 2 weeks (Dr. Jose Schmitz)\"    Primary caregiver: patient and   : Wes Cavazos. [phone]    PCP: Fitz Power    Neuropsychological testing on October 2023: \"Mr. Cramer meets criteria for dementia of a mild to moderate degree. The pattern of his profile continues to suggest primary mesial-temporal dysfunction though with additional, more diffuse deficits potentially implicating frontal-subcortical networks. His cognitive profile, cognitive and functional decline over time, and progression of atrophy on neuroimaging suggests a neurodegenerative condition, most likely of the Alzheimer's type. Additional vascular contribution is also likely\"     Saw Dr. Banks from geriatrics at [facility] last on 6/18/24  \"1) Mild cognitive Impairment:  Slight serial memory decline  Slight serial functional decline   PRIOR visit MOCA/MMSE scored 20/30 with deficits in the delayed recall and visuospatial area.   Of note MMSE score in October 2023 was 22/30   Repeat MRI brain with volumetric scans In August 2023 with Hippocampus at 4th percentile   On Donepezil but Neurology has advised for this to be taken at night and increased Donepezil dose to 10mg and in addition Memantine has been added at 10mg PO BID  Serologies for Vitamin B12, TFTs, Vitamin D pending as kindly advised by PCP  Not clear what the evidence is for Donepezil and Memantine together - Advised to ask Neurology re: this   LAST visit advised to switch Donepezil to day time which Mr. Cramer has done and also taking Memantine 10mg one tablet at night   Will keep memory medications the dsame for now while we address sleep and appetite improvements as below  Neurology has also advised PET scan - Rationale unclear and family " "does not wish to proceed with this  Interested in HHA at home - Has services via [facility] who helped while partner recently had surgery  2) Insomnia : Sleep currently improved but on Quetiapine (as advised by Neurology)   Discussed potential adverse effects with Quetiapine including FDA black box warning of CVA risk   Melatonin did not help   Does have Trazodone at home  Advised to stop Quetiapine and change to Mirtazapine 7.5mg as kindly advised by PCP  Home sleep study Re-discussed today-xx  3) SNHL: Has hearing aids-Encouraged to use regularly for additional memory benefit   4) Weight loss: Current weight 175lbs with BMI of 24.45  Weight was 187 lbs in January 2024  Denies Depression  TFts pending  ? Role of Donepezil in weight loss   Mirtazapine 7.5mg qHS as above \"    HPI     Per patient:  - no pain except the knee is a little sore  - moving bowels  - no SOB or cp     Per   - his memory is not good. No improvement noted with donepezil or memantine  - weight has continued to go down. Was around 172 lbs before hospital stay  - the mirtazapine worked better for sleep than the quetiapine did   -  has been doing most of the iADls for awhile. Sets up his meds. Does \"cooking\", driving. Pt hasn't driving in 1-2 years. Wasn't getting lost.     Living environment prior to admission: condo with his . No stairs    Assistive Devices: straight cane      ADLS prior to admission   Is dependant or requires assistance in the following BADL: was hesitant to bathe at home. But was using a tub shower. And maybe was afraid of falling. Needs stand by assistance with bathing. Dresses on his own  Is dependant or requires assistance in the following Instrumental ADL: most    Advanced Directives on file: wishes to be DNR-Comfort care -arrest    Medications reviewed and reconciled.       Objective     Physical Exam  Vitals: 138/84, 109/73, 176/96, 97.6, 94, 20, 98%  Constitutional:  Well-nourished, kempt  Head: " NC/AT  Eyes: no scleral injection or icterus    Dentition: fair    Oropharyx: clear    Neck: supple. trachea midline. Thyroid not enlarged  Lymphatics: No adenopathy at neck  Respiratory: clear without rales, rhonchi or wheezes  Cardiovascular: RRR, +S1, S2, no murmurs appreciated    Extremeties:  right leg with 1-2+ pitting edema and yellowship discoloration of lower leg and bruising and yellowship discoloration of right upper leg. No clubbing, cyanosis  Gastrointestinal: +BS, soft, nontender  Genitourinary: no clark  Integumentary: No ulcers, pressure sores. Surgical dressing intact. Small puncture like wound with some active bleeding medial to the surgical dressing. Some eschar and open areas medial to the surgical incision and inferior.   Musculoskeletal: some kyphosis of spine    Contractures: none    Muscle bulk: nl    Digits/nails: nl    Effusions: none   Neurologic: alert but forgetful    Psychologic: affect full     Component  Ref Range & Units 1 d ago 2 d ago 3 d ago 4 d ago 5 d ago   Glucose  74 - 99 mg/dL 90 90 87 115 High  110 High    Sodium  136 - 145 mmol/L 140 144 143 141 139   Potassium  3.5 - 5.3 mmol/L 4.2 3.8 4.1 4.5 CM 3.9   Chloride  98 - 107 mmol/L 105 107 108 High  105 103   Bicarbonate  21 - 32 mmol/L 28 28 27 20 Low  20 Low    Anion Gap  10 - 20 mmol/L 11 13 12 21 High  20   Urea Nitrogen  6 - 23 mg/dL 33 High  31 High  32 High  29 High  26 High    Creatinine  0.50 - 1.30 mg/dL 1.71 High  1.80 High  1.96 High  2.06 High  1.73 High    eGFR  >60 mL/min/1.73m*2 39 Low  36 Low  CM 33 Low  CM 31 Low  CM 38 Low  CM   Comment: Calculations of estimated GFR are performed using the 2021 CKD-EPI Study Refit equation without the race variable for the IDMS-Traceable creatinine methods.  https://jasn.asnjournals.org/content/early/2021/09/22/ASN.7804504777   Calcium  8.6 - 10.6 mg/dL 8.6 8.7 8.9 9.6 9.8   Phosphorus  2.5 - 4.9 mg/dL 3.3 3.3 CM 3.0 CM 3.8 CM    Albumin  3.4 - 5.0 g/dL 3.4 3.5 3.7 4.2       Component  Ref Range & Units 1 d ago 2 d ago 3 d ago 4 d ago 5 d ago   WBC  4.4 - 11.3 x10*3/uL 5.3 7.5 6.2 8.3 8.5   Hemoglobin  13.5 - 17.5 g/dL 9.2 Low  9.4 Low  10.2 Low  11.8 Low  12.3 Low    Hematocrit  41.0 - 52.0 % 25.0 Low  26.1 Low  27.5 Low  30.0 Low  32.2 Low    MCV  80 - 100 fL 97 98 99 90 92   RDW  11.5 - 14.5 % 12.5 12.4 12.8 12.0 12.3   Platelets  150 - 450 x10*3/uL 148 Low  157 162 150 183            Component  Ref Range & Units 1 d ago 2 d ago 3 d ago 4 d ago   Magnesium  1.60 - 2.40 mg/dL 1.86 1.94 2.00 1.78 CM        Component  Ref Range & Units 2 mo ago   Vitamin B12  232 - 1245 pg/mL 846     Component  Ref Range & Units 2 mo ago   Vitamin D 25 Hydroxy  31.0 - 80.0 ng/mL 39.6     Component  Ref Range & Units 2 mo ago   TSH  0.270 - 4.200 mIU/L 1.360     Component  Ref Range & Units 2 mo ago   Protein, Total  6.3 - 8.0 g/dL 6.6   Albumin  3.9 - 4.9 g/dL 3.9   Calcium, Total  8.5 - 10.2 mg/dL 9.1   Bilirubin, Total  0.2 - 1.3 mg/dL 0.7   Alkaline Phosphatase  38 - 113 U/L 50   AST  14 - 40 U/L 17   ALT  10 - 54 U/L 6 Low        CT head wo IV contrast 08/17/2024  CT maxillofacial bones wo IV contrast 08/17/2024  CT cervical spine wo IV contrast 08/17/2024  CT HEAD:  No hyperdense intracranial hemorrhage is identified. There is no mass  effect or midline shift.  Gray-white differentiation is intact, without evidence of CT apparent  transcortical infarct, although patchy attenuation changes are  present in the periventricular and subcortical white matter of  bilateral cerebral hemispheres, nonspecific findings favored to  represent sequela of microvascular disease.  No abnormal ventricular dilatation is present. Basal cisterns are  patent. No extra-axial fluid collections are identified.  Scalp soft tissues do not demonstrate any acute abnormality.  Calvarium is unremarkable without evidence of skull fracture.  Mastoid air cells and middle ear cavities are clear.  CT FACIAL BONES:  Bony orbits  are intact, without evidence of fracture. Periorbital  soft tissues and intraorbital structures are symmetric in appearance  without evidence of acute trauma. Patient is status post cataract  extraction bilaterally.  No acute facial bone fracture is identified. Nasal bones are  unremarkable in appearance.  Paranasal sinuses are well aerated without evidence of air-fluid  levels.  Some soft tissue swelling overlies the chin, without evidence of  underlying osseous injury. No associated fluid collection or soft  tissue gas is present.Although evaluation of the oral cavity is degraded by beam hardening artifact no definite evidence of acute trauma to the oral cavity is present. Temporomandibular joints are intact, with asymmetric  degenerative changes present in the right.  Large periapical lucency surrounds the roots of the 2nd right  maxillary molar.  Parotid and submandibular glands are symmetric in appearance and  otherwise unremarkable.  CT C-SPINE:  There is straightening of normal lordotic curvature of the cervical  spine, without evidence of significant spondylolisthesis.  Cervical vertebral body heights are preserved without evidence of  compression fractures, although some insufficiency endplate changes  with smaller Schmorl's nodes are present at several levels. There is  no evidence of acute trauma to the posterior elements of the cervical  spine.  Craniocervical junction is intact, although degenerative changes at  the atlantoaxial articulation with hypertrophic pannus extending into  the anterior epidural space of C1 contributing to mild spinal canal  narrowing at this level with the effacement of the anterior  subarachnoid space.  Facet joints are preserved without evidence of traumatic subluxation  or perching, although multilevel degenerate facet osteoarthropathy is  evident, most pronounced at C2-C3 and C3-C4 on the left.  Multilevel intervertebral disc height loss is present, moderate to  severe at  C4-C5 and C5-C6 and mild at other levels.  No high-grade stenosis is identified in the cervical spine, although  mild spinal canal narrowing is suspected at C3-C4, C4-C5 and C5-C6  due to disc osteophyte complexes and ligamentum flavum thickening.  Mild spinal canal narrowing is present at C3-C4 bilaterally and C4-C5  on the left due to uncovertebral and facet joint hypertrophy.  Prevertebral and paraspinal soft tissues do not demonstrate any acute  abnormalities. Included lung apices are clear.  Impression  CT HEAD:  1. No evidence of hemorrhage, skull fracture, or other acute  intracranial trauma/abnormality.  2. Patchy attenuation changes are present in the periventricular and  subcortical white matter of bilateral cerebral hemispheres,  nonspecific findings favored to represent sequela of microvascular  disease.  CT FACIAL BONES:  1. Soft tissue swelling overlies the chin, likely posttraumatic  without absence of the underlying osseous injury. No facial or  orbital bone fracture is present.  2. Large periapical lucency surrounds the roots of the 2nd right  mandibular molar, correlate with dental exam.  CT C-SPINE:  1. No evidence of acute trauma to the cervical spine.  2. Multilevel degenerative changes of the cervical spine are present,  with intervertebral disc height loss, facet osteoarthropathy, and  mild spinal canal and neural foraminal stenosis at several levels due  to disc osteophyte complexes, ligamentum flavum thickening and  hypertrophic facet changes.    Assessment/Plan    Right knee hematoma, Status post incision and drainage  2. Knee pain   3. R knee wound  4. Leg swelling  - fell at home down 1 step on 8/17. Found to have R knee hematoma causing lateralization of patella. Underwent I&D by ortho on 8/19.   - is on aspirin 81mg bID for DVT prophylaxis. However since didn't have full joint surgery and Is still having active bleeding from wound, will decrease to once daily.   - on tylenol 1000mg TID    - needs f/up with ortho -Dr. Schmitz in 2 weeks  - mepilex dressing to be removed 8/26. Has vasoline gauze over abrasion on medial right knee  - has significant discoloration and leg swelling of thigh and lower leg due to the hematoma tracking within the skin tissue.   - wrap leg daily with ace bandages from base of toes to mid thigh.     5. Recurrent falls  6. Physical deconditioning   - falls are likely multifactorial due to cognitive impairment, medication, osteoarthritis    7. HTN  Is on lisinopril 20mg daily. Will continue. Monitor Bps    8. Dementia- mild CDR 1.0  - gets help from  with most adls. Needs stand by assistance with bathing, mainly due to risk for falls.   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 MRI in 8/2023 showed hippocampal volume at 4%. Follows with both geriatrics and neurology at [facility].  Is on donepezil 10mg daily. Also was on memantine 10mg BID though it was decreased to just at bedtime. Will continue these medications for now. However may wean off of donepezil if weight loss continues.      9. Weight loss  - down 12 lbs between 1/2024 and 6/2024. Has been on donepezil, which may have contributed to the weight loss at least in part. Was started on mirtazapine 7.5mg HS in 6/2024 to help with appetite and sleep. Now on 15mg daily.  notes that it has been helping with sleep. But still has been losing weight. Possibly down to 172 lbs prior to hospitalization. 180 lbs here. Will monitor weight weekly. Dietician following.     10. VEDA on CKD  - cr was up to 2.06 in hospital. Improved to 1.7 on 7/21. Baseline is around 1.5. will monitor kidney function. Will check renal panel on 8/26    11. Anemia  - hemoglobin decreased from 12 to 9.2 during hospitalization. Likely related to hematoma.     12. BPH with urination retention   - is on finasteride 5mg daily. And  straight catheterizes himself with assistance 4 times daily. Will continue and monitor for urinary retention     13. HL   - is on pravastatin 10mg TID. If he is on this for primary prevention, could consider stopping this    14. GERD  - on pantoprazole 20mg daily. Will continue for now though not clear he truly needs this. Could consider weaning off of this as an outpatient    15. Depression   - n escitalopram 10mg daily. Also on mirtazapine 15mg daily. Was started at 7.5mg HS in 6/2024 to replace quetiapine. Will continue these meds for now.     16. Constipation  - on miralax daily. Monitor bowels    17. Vitamin D deficiency  - D level was 39.6 in 6/2024. Is on D3 2000 units daily. Will continue    18. Goals of care counseling discussion   Wishes to be DNR-CC-A. [place] form completed today.     Disposition: Will discuss with pt, family, and interdisciplinary team throughout his rehab stay      Tami Marroquin MD      Electronically Signed By: Tami Marroquin MD   8/24/24  4:58 PM

## 2024-09-05 ENCOUNTER — NURSING HOME VISIT (OUTPATIENT)
Dept: POST ACUTE CARE | Facility: EXTERNAL LOCATION | Age: 84
End: 2024-09-05
Payer: MEDICARE

## 2024-09-05 DIAGNOSIS — R63.4 WEIGHT LOSS: ICD-10-CM

## 2024-09-05 DIAGNOSIS — R29.6 RECURRENT FALLS: ICD-10-CM

## 2024-09-05 DIAGNOSIS — S80.01XD TRAUMATIC HEMATOMA OF RIGHT KNEE, SUBSEQUENT ENCOUNTER: Primary | ICD-10-CM

## 2024-09-05 DIAGNOSIS — N18.9 ACUTE KIDNEY INJURY SUPERIMPOSED ON CKD (CMS-HCC): ICD-10-CM

## 2024-09-05 DIAGNOSIS — N40.1 BENIGN PROSTATIC HYPERPLASIA WITH INCOMPLETE BLADDER EMPTYING: ICD-10-CM

## 2024-09-05 DIAGNOSIS — R39.14 BENIGN PROSTATIC HYPERPLASIA WITH INCOMPLETE BLADDER EMPTYING: ICD-10-CM

## 2024-09-05 DIAGNOSIS — F03.918 DEMENTIA WITH BEHAVIORAL DISTURBANCE (MULTI): ICD-10-CM

## 2024-09-05 DIAGNOSIS — F32.A DEPRESSION, UNSPECIFIED DEPRESSION TYPE: ICD-10-CM

## 2024-09-05 DIAGNOSIS — D64.9 ANEMIA, UNSPECIFIED TYPE: ICD-10-CM

## 2024-09-05 DIAGNOSIS — R53.81 PHYSICAL DECONDITIONING: ICD-10-CM

## 2024-09-05 DIAGNOSIS — N17.9 ACUTE KIDNEY INJURY SUPERIMPOSED ON CKD (CMS-HCC): ICD-10-CM

## 2024-09-05 DIAGNOSIS — I10 PRIMARY HYPERTENSION: ICD-10-CM

## 2024-09-05 PROCEDURE — 99308 SBSQ NF CARE LOW MDM 20: CPT | Performed by: NURSE PRACTITIONER

## 2024-09-05 ASSESSMENT — ENCOUNTER SYMPTOMS
CONFUSION: 1
APPETITE CHANGE: 0
SHORTNESS OF BREATH: 0
ABDOMINAL PAIN: 0
DIFFICULTY URINATING: 1
HEMATURIA: 1
BACK PAIN: 0
ACTIVITY CHANGE: 0
ARTHRALGIAS: 0

## 2024-09-05 NOTE — PROGRESS NOTES
"Reason for visit: phys decondition, anemia, hematuria, R knee hematoma    Subjective   HPI:84 y.o. year old male was admitted to [facility] unit on 8/21. Patient is seen for the initial SNF visit on 8/23. He has a past medical history of dementia, HTN, CKD, HLD, GERD, peripheral neuropathy, and BPH (self-cath 3-4x every day) .    Admitted from: acute care hospital  Facility: [facility]   Dates of hospitalization: 8/17-8/21/24  Hospital Course:  \"admitted following unprovoked mechanical fall down 1 step resulting in R Knee hematoma which rapidly increased in size with a hemorrhagic bullae on the anteromedial portion of the R knee, causing lateralization of the patella. He did not endorse much pain over the area, and had some bruising both proximal and distal to the blister. Exam also notable for bruising on his right chin. Imaging in the ED was unremarkable except for soft tissue swelling indicative of hematoma on CT R knee. CT head, CT maxillofacial bone, CT C spine, XR knee, XR tibia fibula, XR femur, and XR hip and pelvis all unremarkable for acute abnormalities. Gait exam notable for some unsteadiness, and patient has had history of 3 falls in the past month. Labs notable for VEDA on CKD with Cr 1.73>2.06 after fluids (baseline 1.5), as well as asymptomatic bacteriuria on urinalysis (leuk esterase 500+; WBC 21-50). Patient straight caths himself at baseline, as was having some minor bleeding when doing this in hospital. Patient evaluated by orthopedic surgery for hematoma management, and taken for I&D of hematoma on 8/19. Postoperatively, patient was doing well and endorsed minimal pain. Anticoagulation was restarted with ASA 81 BID on POD1, and hemovac drain removed on 8/21.  Throughout admission, exam was also notable for dementia, with poor memory and recall of events leading up to the fall. This seemed to be at his baseline. Patient depends on  for help with memory and medical " management.    Seen to day for fu deconditioning, L knee hematoma, hematuria, anemia. Pt says he is not having much hematuria with caths anymore, had a small amount today but thinks he went in too fast. Continues participation with therapy, plan for appt with ortho tomorrow. No issues with bowels, eating well. Partner notes that some home improvements made in bathroom in anticipation of dc home.     Review of Systems   Constitutional:  Negative for activity change and appetite change.   Respiratory:  Negative for shortness of breath.    Cardiovascular:  Negative for chest pain.   Gastrointestinal:  Negative for abdominal pain.   Genitourinary:  Positive for difficulty urinating (self cath) and hematuria.   Musculoskeletal:  Negative for arthralgias and back pain.   Skin:         Wound on R knee   Psychiatric/Behavioral:  Positive for confusion (forgetfulness, pt is  self aware).        Objective   VS: reviewed in point click care BP: 131/75 mmHg  9/5/2024 12:19    Temp:97.2 °F  9/5/2024 12:19  Pulse:69 bpm  9/5/2024 12:19  Weight:168 Lbs  9/2/2024 15:27  Resp:18 Breaths/min  9/5/2024 12:19  BS:  O2:100 %  9/5/2024 12:19  Pain:0  9/5/2024 06:29      Physical Exam  Vitals reviewed.   Constitutional:       General: He is not in acute distress.     Appearance: He is normal weight.   HENT:      Nose: Nose normal.      Mouth/Throat:      Mouth: Mucous membranes are moist.      Pharynx: Oropharynx is clear.   Cardiovascular:      Rate and Rhythm: Normal rate and regular rhythm.      Pulses: Normal pulses.      Heart sounds: Normal heart sounds.   Pulmonary:      Effort: Pulmonary effort is normal.      Breath sounds: Normal breath sounds.   Abdominal:      General: Abdomen is flat. Bowel sounds are normal.      Palpations: Abdomen is soft.   Musculoskeletal:      Cervical back: Normal range of motion and neck supple.      Right knee: Ecchymosis present. Decreased range of motion.      Left knee: Normal.   Skin:      General: Skin is warm and dry.      Capillary Refill: Capillary refill takes less than 2 seconds.      Comments: Dressing to R knee intact, no drainage   Neurological:      Mental Status: He is alert. Mental status is at baseline.   Psychiatric:         Cognition and Memory: Cognition is impaired (slums 12/30 per therapy). Memory is impaired.       Lab work [id number] CBC abnormals only H&H 9.9/28.4, prior 826 2411/32.  Lab work [id number] BMP abnormals only BUN 27, GFR 44  Assessment/Plan   Right knee hematoma, Status post incision and drainage  Knee pain   R knee wound  Leg swelling  - fell off 1 step on 8/17. Found to have R knee hematoma causing lateralization of patella. Underwent I&D by ortho on 8/19.   Was on aspirin 81mg bid for DVT prophylaxis. However since didn't have full joint surgery and was having active bleeding from wound as well has hemturia, decreased to once daily.   - on tylenol 1000mg TID   - has significant discoloration and leg swelling of thigh and lower leg due to the hematoma tracking within the skin tissue.   - wrap leg daily with ace bandages from base of toes to mid thigh.   - fu ortho in AM    Recurrent falls  Physical deconditioning   - 3 falls over 3 weeks at home  - falls are likely multifactorial due to cognitive impairment, medication, osteoarthritis  - PT and OT     HTN  -lisinopril 20mg daily.  - bp stable     Dementia- mild CDR 1.0  - gets help from  with most adls. Needs stand by assistance with bathing, mainly due to risk for falls.   -self aware he has deficits.   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 MRI in 8/2023 showed hippocampal volume at 4%. Follows with both geriatrics and neurology at [facility].  Is on donepezil 10mg daily. Also was on memantine 10mg BID though it was decreased to just at bedtime. Will continue these medications for now. However may  wean off of donepezil if weight loss continues.      Weight loss  - down 12 lbs between 1/2024 and 6/2024. Has been on donepezil, which may have contributed to the weight loss at least in part. Was started on mirtazapine 7.5mg HS in 6/2024 to help with appetite and sleep. Now on 15mg daily.  notes that it has been helping with sleep. But still has been losing weight. Possibly down to 172 lbs prior to hospitalization. 180 lbs here.   - dietician following,   -wts are currently stable no losses    VEDA on CKD  - cr was up to 2.06 in hospital. Improved to 1.7 on 7/21. Baseline is around 1.5. will monitor kidney function.  - last check 082624 1.5    Anemia  - hemoglobin decreased from 12 to 9.2 during hospitalization. Likely related to hematoma. 9.9 last check, drop from 11 prior but pt had episode of hematuria.  - note asa is only daily now   - asymptomatic, consider recheck before dc to make sure he is rising.     BPH with urination retention   - is on finasteride 5mg daily. And straight catheterizes himself with assistance 4 times daily. Will continue and monitor for urinary retention     HL   - is on pravastatin 10mg TID. If he is on this for primary prevention, could consider stopping this in future.     GERD  - on pantoprazole 20mg daily. Will continue for now though not clear he truly needs this. Could consider weaning off of this as an outpatient    Depression   - escitalopram 10mg daily. Also on mirtazapine 15mg daily. Was started at 7.5mg HS in 6/2024 to replace quetiapine.   - stable     Constipation  - on miralax daily. Bowels moving well.     Vitamin D deficiency  - D level was 39.6 in 6/2024. Is on D3 2000 units daily.     DC planning  - home environment being adjusted in anticipation of dc to home    Med review  - reviewed in [facility]    Code status  DNRCCA    Dispo: stable to see ortho in AM. No dc date yet.     CHOCO Vivar-CNP  09/05/24

## 2024-09-05 NOTE — LETTER
"Patient: Gal Cramer  : 1940    Encounter Date: 2024    Reason for visit: phys decondition, anemia, hematuria, R knee hematoma    Subjective  HPI:84 y.o. year old male was admitted to [facility] unit on . Patient is seen for the initial SNF visit on . He has a past medical history of dementia, HTN, CKD, HLD, GERD, peripheral neuropathy, and BPH (self-cath 3-4x every day) .    Admitted from: acute care hospital  Facility: [facility]   Dates of hospitalization: -24  Hospital Course:  \"admitted following unprovoked mechanical fall down 1 step resulting in R Knee hematoma which rapidly increased in size with a hemorrhagic bullae on the anteromedial portion of the R knee, causing lateralization of the patella. He did not endorse much pain over the area, and had some bruising both proximal and distal to the blister. Exam also notable for bruising on his right chin. Imaging in the ED was unremarkable except for soft tissue swelling indicative of hematoma on CT R knee. CT head, CT maxillofacial bone, CT C spine, XR knee, XR tibia fibula, XR femur, and XR hip and pelvis all unremarkable for acute abnormalities. Gait exam notable for some unsteadiness, and patient has had history of 3 falls in the past month. Labs notable for VEDA on CKD with Cr 1.73>2.06 after fluids (baseline 1.5), as well as asymptomatic bacteriuria on urinalysis (leuk esterase 500+; WBC 21-50). Patient straight caths himself at baseline, as was having some minor bleeding when doing this in hospital. Patient evaluated by orthopedic surgery for hematoma management, and taken for I&D of hematoma on . Postoperatively, patient was doing well and endorsed minimal pain. Anticoagulation was restarted with ASA 81 BID on POD1, and hemovac drain removed on .  Throughout admission, exam was also notable for dementia, with poor memory and recall of events leading up to the fall. This seemed to be at " his baseline. Patient depends on  for help with memory and medical management.    Seen to day for fu deconditioning, L knee hematoma, hematuria, anemia. Pt says he is not having much hematuria with caths anymore, had a small amount today but thinks he went in too fast. Continues participation with therapy, plan for appt with ortho tomorrow. No issues with bowels, eating well. Partner notes that some home improvements made in bathroom in anticipation of dc home.     Review of Systems   Constitutional:  Negative for activity change and appetite change.   Respiratory:  Negative for shortness of breath.    Cardiovascular:  Negative for chest pain.   Gastrointestinal:  Negative for abdominal pain.   Genitourinary:  Positive for difficulty urinating (self cath) and hematuria.   Musculoskeletal:  Negative for arthralgias and back pain.   Skin:         Wound on R knee   Psychiatric/Behavioral:  Positive for confusion (forgetfulness, pt is  self aware).        Objective  VS: reviewed in point click care BP: 131/75 mmHg  9/5/2024 12:19    Temp:97.2 °F  9/5/2024 12:19  Pulse:69 bpm  9/5/2024 12:19  Weight:168 Lbs  9/2/2024 15:27  Resp:18 Breaths/min  9/5/2024 12:19  BS:  O2:100 %  9/5/2024 12:19  Pain:0  9/5/2024 06:29      Physical Exam  Vitals reviewed.   Constitutional:       General: He is not in acute distress.     Appearance: He is normal weight.   HENT:      Nose: Nose normal.      Mouth/Throat:      Mouth: Mucous membranes are moist.      Pharynx: Oropharynx is clear.   Cardiovascular:      Rate and Rhythm: Normal rate and regular rhythm.      Pulses: Normal pulses.      Heart sounds: Normal heart sounds.   Pulmonary:      Effort: Pulmonary effort is normal.      Breath sounds: Normal breath sounds.   Abdominal:      General: Abdomen is flat. Bowel sounds are normal.      Palpations: Abdomen is soft.   Musculoskeletal:      Cervical back: Normal range of motion and neck supple.      Right knee: Ecchymosis  present. Decreased range of motion.      Left knee: Normal.   Skin:     General: Skin is warm and dry.      Capillary Refill: Capillary refill takes less than 2 seconds.      Comments: Dressing to R knee intact, no drainage   Neurological:      Mental Status: He is alert. Mental status is at baseline.   Psychiatric:         Cognition and Memory: Cognition is impaired (slums 12/30 per therapy). Memory is impaired.       Lab work [id number] CBC abnormals only H&H 9.9/28.4, prior 826 2411/32.  Lab work [id number] BMP abnormals only BUN 27, GFR 44  Assessment/Plan  Right knee hematoma, Status post incision and drainage  Knee pain   R knee wound  Leg swelling  - fell off 1 step on 8/17. Found to have R knee hematoma causing lateralization of patella. Underwent I&D by ortho on 8/19.   Was on aspirin 81mg bid for DVT prophylaxis. However since didn't have full joint surgery and was having active bleeding from wound as well has hemturia, decreased to once daily.   - on tylenol 1000mg TID   - has significant discoloration and leg swelling of thigh and lower leg due to the hematoma tracking within the skin tissue.   - wrap leg daily with ace bandages from base of toes to mid thigh.   - fu ortho in AM    Recurrent falls  Physical deconditioning   - 3 falls over 3 weeks at home  - falls are likely multifactorial due to cognitive impairment, medication, osteoarthritis  - PT and OT     HTN  -lisinopril 20mg daily.  - bp stable     Dementia- mild CDR 1.0  - gets help from  with most adls. Needs stand by assistance with bathing, mainly due to risk for falls.   -self aware he has deficits.   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 MRI in 8/2023 showed hippocampal volume at 4%. Follows with both geriatrics and neurology at [facility].  Is on donepezil 10mg daily. Also was on memantine 10mg BID though it was decreased to  just at bedtime. Will continue these medications for now. However may wean off of donepezil if weight loss continues.      Weight loss  - down 12 lbs between 1/2024 and 6/2024. Has been on donepezil, which may have contributed to the weight loss at least in part. Was started on mirtazapine 7.5mg HS in 6/2024 to help with appetite and sleep. Now on 15mg daily.  notes that it has been helping with sleep. But still has been losing weight. Possibly down to 172 lbs prior to hospitalization. 180 lbs here.   - dietician following,   -wts are currently stable no losses    VEDA on CKD  - cr was up to 2.06 in hospital. Improved to 1.7 on 7/21. Baseline is around 1.5. will monitor kidney function.  - last check 082624 1.5    Anemia  - hemoglobin decreased from 12 to 9.2 during hospitalization. Likely related to hematoma. 9.9 last check, drop from 11 prior but pt had episode of hematuria.  - note asa is only daily now   - asymptomatic, consider recheck before dc to make sure he is rising.     BPH with urination retention   - is on finasteride 5mg daily. And straight catheterizes himself with assistance 4 times daily. Will continue and monitor for urinary retention     HL   - is on pravastatin 10mg TID. If he is on this for primary prevention, could consider stopping this in future.     GERD  - on pantoprazole 20mg daily. Will continue for now though not clear he truly needs this. Could consider weaning off of this as an outpatient    Depression   - escitalopram 10mg daily. Also on mirtazapine 15mg daily. Was started at 7.5mg HS in 6/2024 to replace quetiapine.   - stable     Constipation  - on miralax daily. Bowels moving well.     Vitamin D deficiency  - D level was 39.6 in 6/2024. Is on D3 2000 units daily.     DC planning  - home environment being adjusted in anticipation of dc to home    Med review  - reviewed in [facility]    Code status  DNRCCA    Dispo: stable to see ortho in AM. No dc date yet.     Missy Gray,  APRN-CNP  09/05/24      Electronically Signed By: DARLENE Vivar   9/5/24  3:46 PM

## 2024-09-06 ENCOUNTER — HOSPITAL ENCOUNTER (INPATIENT)
Facility: HOSPITAL | Age: 84
LOS: 6 days | Discharge: NURSING, REHAB OR LONG-TERM CARE | DRG: 902 | End: 2024-09-12
Attending: EMERGENCY MEDICINE | Admitting: INTERNAL MEDICINE
Payer: MEDICARE

## 2024-09-06 ENCOUNTER — OFFICE VISIT (OUTPATIENT)
Dept: ORTHOPEDIC SURGERY | Facility: HOSPITAL | Age: 84
End: 2024-09-06
Payer: MEDICARE

## 2024-09-06 ENCOUNTER — APPOINTMENT (OUTPATIENT)
Dept: RADIOLOGY | Facility: HOSPITAL | Age: 84
DRG: 902 | End: 2024-09-06
Payer: MEDICARE

## 2024-09-06 VITALS — BODY MASS INDEX: 27.28 KG/M2 | WEIGHT: 180 LBS | HEIGHT: 68 IN

## 2024-09-06 DIAGNOSIS — M25.562 PAIN IN LATERAL PORTION OF LEFT KNEE: Primary | ICD-10-CM

## 2024-09-06 DIAGNOSIS — K21.9 GASTROESOPHAGEAL REFLUX DISEASE, UNSPECIFIED WHETHER ESOPHAGITIS PRESENT: ICD-10-CM

## 2024-09-06 DIAGNOSIS — S80.01XA TRAUMATIC HEMATOMA OF RIGHT KNEE, INITIAL ENCOUNTER: ICD-10-CM

## 2024-09-06 DIAGNOSIS — N13.8 BENIGN PROSTATIC HYPERPLASIA WITH URINARY OBSTRUCTION: ICD-10-CM

## 2024-09-06 DIAGNOSIS — S80.01XD TRAUMATIC HEMATOMA OF RIGHT KNEE, SUBSEQUENT ENCOUNTER: ICD-10-CM

## 2024-09-06 DIAGNOSIS — N18.31 STAGE 3A CHRONIC KIDNEY DISEASE (MULTI): ICD-10-CM

## 2024-09-06 DIAGNOSIS — I10 PRIMARY HYPERTENSION: ICD-10-CM

## 2024-09-06 DIAGNOSIS — R60.1 GENERALIZED EDEMA: ICD-10-CM

## 2024-09-06 DIAGNOSIS — N40.1 BENIGN PROSTATIC HYPERPLASIA WITH URINARY OBSTRUCTION: ICD-10-CM

## 2024-09-06 DIAGNOSIS — Z98.890 STATUS POST INCISION AND DRAINAGE: ICD-10-CM

## 2024-09-06 DIAGNOSIS — I82.4Z9 ACUTE DEEP VEIN THROMBOSIS (DVT) OF DISTAL VEIN OF LOWER EXTREMITY, UNSPECIFIED LATERALITY (MULTI): ICD-10-CM

## 2024-09-06 LAB
ABO GROUP (TYPE) IN BLOOD: NORMAL
ALBUMIN SERPL BCP-MCNC: 4.3 G/DL (ref 3.4–5)
ALP SERPL-CCNC: 85 U/L (ref 33–136)
ALT SERPL W P-5'-P-CCNC: 8 U/L (ref 10–52)
ANION GAP SERPL CALC-SCNC: 13 MMOL/L (ref 10–20)
ANION GAP SERPL CALC-SCNC: 13 MMOL/L (ref 10–20)
ANTIBODY SCREEN: NORMAL
APTT PPP: 33 SECONDS (ref 27–38)
AST SERPL W P-5'-P-CCNC: 14 U/L (ref 9–39)
BASOPHILS # BLD AUTO: 0.06 X10*3/UL (ref 0–0.1)
BASOPHILS NFR BLD AUTO: 1.5 %
BILIRUB SERPL-MCNC: 0.9 MG/DL (ref 0–1.2)
BUN SERPL-MCNC: 33 MG/DL (ref 6–23)
BUN SERPL-MCNC: 33 MG/DL (ref 6–23)
CALCIUM SERPL-MCNC: 9.6 MG/DL (ref 8.6–10.6)
CALCIUM SERPL-MCNC: 9.6 MG/DL (ref 8.6–10.6)
CHLORIDE SERPL-SCNC: 104 MMOL/L (ref 98–107)
CHLORIDE SERPL-SCNC: 104 MMOL/L (ref 98–107)
CO2 SERPL-SCNC: 27 MMOL/L (ref 21–32)
CO2 SERPL-SCNC: 27 MMOL/L (ref 21–32)
CREAT SERPL-MCNC: 1.87 MG/DL (ref 0.5–1.3)
CREAT SERPL-MCNC: 1.87 MG/DL (ref 0.5–1.3)
CRP SERPL-MCNC: 0.18 MG/DL
EGFRCR SERPLBLD CKD-EPI 2021: 35 ML/MIN/1.73M*2
EGFRCR SERPLBLD CKD-EPI 2021: 35 ML/MIN/1.73M*2
EOSINOPHIL # BLD AUTO: 0.11 X10*3/UL (ref 0–0.4)
EOSINOPHIL NFR BLD AUTO: 2.8 %
ERYTHROCYTE [DISTWIDTH] IN BLOOD BY AUTOMATED COUNT: 12.6 % (ref 11.5–14.5)
ERYTHROCYTE [DISTWIDTH] IN BLOOD BY AUTOMATED COUNT: 12.7 % (ref 11.5–14.5)
ERYTHROCYTE [SEDIMENTATION RATE] IN BLOOD BY WESTERGREN METHOD: 23 MM/H (ref 0–20)
GLUCOSE SERPL-MCNC: 92 MG/DL (ref 74–99)
GLUCOSE SERPL-MCNC: 92 MG/DL (ref 74–99)
HCT VFR BLD AUTO: 27.8 % (ref 41–52)
HCT VFR BLD AUTO: 34.2 % (ref 41–52)
HGB BLD-MCNC: 12.4 G/DL (ref 13.5–17.5)
HGB BLD-MCNC: 9.6 G/DL (ref 13.5–17.5)
IMM GRANULOCYTES # BLD AUTO: 0.01 X10*3/UL (ref 0–0.5)
IMM GRANULOCYTES NFR BLD AUTO: 0.3 % (ref 0–0.9)
INR PPP: 1 (ref 0.9–1.1)
INR PPP: 1 (ref 0.9–1.1)
LYMPHOCYTES # BLD AUTO: 0.78 X10*3/UL (ref 0.8–3)
LYMPHOCYTES NFR BLD AUTO: 19.9 %
MCH RBC QN AUTO: 32.1 PG (ref 26–34)
MCH RBC QN AUTO: 33.9 PG (ref 26–34)
MCHC RBC AUTO-ENTMCNC: 34.5 G/DL (ref 32–36)
MCHC RBC AUTO-ENTMCNC: 36.3 G/DL (ref 32–36)
MCV RBC AUTO: 93 FL (ref 80–100)
MCV RBC AUTO: 93 FL (ref 80–100)
MONOCYTES # BLD AUTO: 0.32 X10*3/UL (ref 0.05–0.8)
MONOCYTES NFR BLD AUTO: 8.2 %
NEUTROPHILS # BLD AUTO: 2.63 X10*3/UL (ref 1.6–5.5)
NEUTROPHILS NFR BLD AUTO: 67.3 %
NRBC BLD-RTO: 0 /100 WBCS (ref 0–0)
NRBC BLD-RTO: 0 /100 WBCS (ref 0–0)
PLATELET # BLD AUTO: 218 X10*3/UL (ref 150–450)
PLATELET # BLD AUTO: 232 X10*3/UL (ref 150–450)
POTASSIUM SERPL-SCNC: 4.3 MMOL/L (ref 3.5–5.3)
POTASSIUM SERPL-SCNC: 4.3 MMOL/L (ref 3.5–5.3)
PROT SERPL-MCNC: 7.4 G/DL (ref 6.4–8.2)
PROTHROMBIN TIME: 11 SECONDS (ref 9.8–12.8)
PROTHROMBIN TIME: 11.6 SECONDS (ref 9.8–12.8)
RBC # BLD AUTO: 2.99 X10*6/UL (ref 4.5–5.9)
RBC # BLD AUTO: 3.66 X10*6/UL (ref 4.5–5.9)
RH FACTOR (ANTIGEN D): NORMAL
SODIUM SERPL-SCNC: 140 MMOL/L (ref 136–145)
SODIUM SERPL-SCNC: 140 MMOL/L (ref 136–145)
WBC # BLD AUTO: 3.9 X10*3/UL (ref 4.4–11.3)
WBC # BLD AUTO: 5.6 X10*3/UL (ref 4.4–11.3)

## 2024-09-06 PROCEDURE — 1159F MED LIST DOCD IN RCRD: CPT | Performed by: PHYSICIAN ASSISTANT

## 2024-09-06 PROCEDURE — 1036F TOBACCO NON-USER: CPT | Performed by: PHYSICIAN ASSISTANT

## 2024-09-06 PROCEDURE — 80053 COMPREHEN METABOLIC PANEL: CPT | Performed by: EMERGENCY MEDICINE

## 2024-09-06 PROCEDURE — 86901 BLOOD TYPING SEROLOGIC RH(D): CPT

## 2024-09-06 PROCEDURE — 85610 PROTHROMBIN TIME: CPT | Performed by: EMERGENCY MEDICINE

## 2024-09-06 PROCEDURE — 80051 ELECTROLYTE PANEL: CPT | Performed by: EMERGENCY MEDICINE

## 2024-09-06 PROCEDURE — 85025 COMPLETE CBC W/AUTO DIFF WBC: CPT | Performed by: EMERGENCY MEDICINE

## 2024-09-06 PROCEDURE — 99223 1ST HOSP IP/OBS HIGH 75: CPT

## 2024-09-06 PROCEDURE — 36415 COLL VENOUS BLD VENIPUNCTURE: CPT | Performed by: EMERGENCY MEDICINE

## 2024-09-06 PROCEDURE — 86140 C-REACTIVE PROTEIN: CPT | Performed by: EMERGENCY MEDICINE

## 2024-09-06 PROCEDURE — 86901 BLOOD TYPING SEROLOGIC RH(D): CPT | Performed by: EMERGENCY MEDICINE

## 2024-09-06 PROCEDURE — 71045 X-RAY EXAM CHEST 1 VIEW: CPT

## 2024-09-06 PROCEDURE — 85610 PROTHROMBIN TIME: CPT

## 2024-09-06 PROCEDURE — 93005 ELECTROCARDIOGRAM TRACING: CPT

## 2024-09-06 PROCEDURE — 85652 RBC SED RATE AUTOMATED: CPT | Performed by: EMERGENCY MEDICINE

## 2024-09-06 PROCEDURE — 1160F RVW MEDS BY RX/DR IN RCRD: CPT | Performed by: PHYSICIAN ASSISTANT

## 2024-09-06 PROCEDURE — 1111F DSCHRG MED/CURRENT MED MERGE: CPT | Performed by: PHYSICIAN ASSISTANT

## 2024-09-06 PROCEDURE — 99285 EMERGENCY DEPT VISIT HI MDM: CPT | Performed by: EMERGENCY MEDICINE

## 2024-09-06 PROCEDURE — 99211 OFF/OP EST MAY X REQ PHY/QHP: CPT | Performed by: PHYSICIAN ASSISTANT

## 2024-09-06 PROCEDURE — 85027 COMPLETE CBC AUTOMATED: CPT

## 2024-09-06 PROCEDURE — 99285 EMERGENCY DEPT VISIT HI MDM: CPT

## 2024-09-06 PROCEDURE — 73564 X-RAY EXAM KNEE 4 OR MORE: CPT | Mod: RIGHT SIDE | Performed by: RADIOLOGY

## 2024-09-06 PROCEDURE — 36415 COLL VENOUS BLD VENIPUNCTURE: CPT

## 2024-09-06 PROCEDURE — 71045 X-RAY EXAM CHEST 1 VIEW: CPT | Performed by: RADIOLOGY

## 2024-09-06 PROCEDURE — 1100000001 HC PRIVATE ROOM DAILY

## 2024-09-06 PROCEDURE — 73564 X-RAY EXAM KNEE 4 OR MORE: CPT | Mod: RT

## 2024-09-06 RX ORDER — ASPIRIN 81 MG/1
81 TABLET ORAL 2 TIMES DAILY
Status: DISCONTINUED | OUTPATIENT
Start: 2024-09-06 | End: 2024-09-12 | Stop reason: HOSPADM

## 2024-09-06 RX ORDER — PRAVASTATIN SODIUM 20 MG/1
10 TABLET ORAL
Status: DISCONTINUED | OUTPATIENT
Start: 2024-09-09 | End: 2024-09-12 | Stop reason: HOSPADM

## 2024-09-06 RX ORDER — OXYCODONE HYDROCHLORIDE 5 MG/1
10 TABLET ORAL EVERY 6 HOURS PRN
Status: DISCONTINUED | OUTPATIENT
Start: 2024-09-06 | End: 2024-09-12 | Stop reason: HOSPADM

## 2024-09-06 RX ORDER — MEMANTINE HYDROCHLORIDE 10 MG/1
10 TABLET ORAL DAILY
Status: DISCONTINUED | OUTPATIENT
Start: 2024-09-07 | End: 2024-09-12 | Stop reason: HOSPADM

## 2024-09-06 RX ORDER — ESCITALOPRAM OXALATE 10 MG/1
10 TABLET ORAL DAILY
Status: DISCONTINUED | OUTPATIENT
Start: 2024-09-07 | End: 2024-09-06

## 2024-09-06 RX ORDER — PANTOPRAZOLE SODIUM 40 MG/1
40 TABLET, DELAYED RELEASE ORAL
Status: DISCONTINUED | OUTPATIENT
Start: 2024-09-07 | End: 2024-09-12 | Stop reason: HOSPADM

## 2024-09-06 RX ORDER — ACETAMINOPHEN 325 MG/1
650 TABLET ORAL EVERY 6 HOURS PRN
Status: DISCONTINUED | OUTPATIENT
Start: 2024-09-06 | End: 2024-09-12 | Stop reason: HOSPADM

## 2024-09-06 RX ORDER — MIRTAZAPINE 15 MG/1
15 TABLET, FILM COATED ORAL NIGHTLY
Status: DISCONTINUED | OUTPATIENT
Start: 2024-09-06 | End: 2024-09-12 | Stop reason: HOSPADM

## 2024-09-06 RX ORDER — OXYCODONE HYDROCHLORIDE 5 MG/1
5 TABLET ORAL EVERY 6 HOURS PRN
Status: DISCONTINUED | OUTPATIENT
Start: 2024-09-06 | End: 2024-09-12 | Stop reason: HOSPADM

## 2024-09-06 RX ORDER — LISINOPRIL 20 MG/1
20 TABLET ORAL DAILY
Status: DISCONTINUED | OUTPATIENT
Start: 2024-09-07 | End: 2024-09-12 | Stop reason: HOSPADM

## 2024-09-06 RX ORDER — POLYETHYLENE GLYCOL 3350 17 G/17G
17 POWDER, FOR SOLUTION ORAL DAILY
Status: DISCONTINUED | OUTPATIENT
Start: 2024-09-07 | End: 2024-09-12 | Stop reason: HOSPADM

## 2024-09-06 RX ORDER — FINASTERIDE 5 MG/1
5 TABLET, FILM COATED ORAL DAILY
Status: DISCONTINUED | OUTPATIENT
Start: 2024-09-07 | End: 2024-09-12 | Stop reason: HOSPADM

## 2024-09-06 RX ORDER — DONEPEZIL HYDROCHLORIDE 10 MG/1
10 TABLET, FILM COATED ORAL DAILY
Status: DISCONTINUED | OUTPATIENT
Start: 2024-09-07 | End: 2024-09-12 | Stop reason: HOSPADM

## 2024-09-06 RX ORDER — FERROUS SULFATE, DRIED 160(50) MG
2 TABLET, EXTENDED RELEASE ORAL DAILY
Status: DISCONTINUED | OUTPATIENT
Start: 2024-09-07 | End: 2024-09-12 | Stop reason: HOSPADM

## 2024-09-06 ASSESSMENT — PAIN SCALES - GENERAL: PAINLEVEL_OUTOF10: 0 - NO PAIN

## 2024-09-06 ASSESSMENT — COLUMBIA-SUICIDE SEVERITY RATING SCALE - C-SSRS
2. HAVE YOU ACTUALLY HAD ANY THOUGHTS OF KILLING YOURSELF?: NO
6. HAVE YOU EVER DONE ANYTHING, STARTED TO DO ANYTHING, OR PREPARED TO DO ANYTHING TO END YOUR LIFE?: NO
1. IN THE PAST MONTH, HAVE YOU WISHED YOU WERE DEAD OR WISHED YOU COULD GO TO SLEEP AND NOT WAKE UP?: NO

## 2024-09-06 ASSESSMENT — PAIN - FUNCTIONAL ASSESSMENT: PAIN_FUNCTIONAL_ASSESSMENT: 0-10

## 2024-09-06 NOTE — ED TRIAGE NOTES
Pt had surgery ~2 weeks ago on R knee to drain blood after fall. Sent from [facility] because fluid re accumulated on knee and needs surgery tomorrow

## 2024-09-06 NOTE — PROGRESS NOTES
History of Present Illness   Gal Cramer is a 84 y.o. male presenting today for post-op check from incision and drainage of traumatic right knee hematoma on 8/19/2024.  Patient has a history of dementia and most of his history is provided by his .  Over the past several days they have noted increased swelling in the knee.  Shortly after discharge from the hospital he had developed a blister over the medial knee that popped a few days ago.  Since then he has had a large wound to the medial knee that the facility has been doing Xeroform dressings on.  His swelling seems to be getting worse.  He also has redness in the area with increased warmth now as well.  There have been no fevers or chills or any other complaints or concerns at this time.    Past Medical History:   Diagnosis Date    Dementia (Multi)     GERD (gastroesophageal reflux disease)     HL (hearing loss)     Hyperlipidemia     Hypertension     Vision loss        Medication Documentation Review Audit       Reviewed by Mirtha Nixon on 09/06/24 at 1210      Medication Order Taking? Sig Documenting Provider Last Dose Status   acetaminophen (Tylenol) 325 mg tablet [id number]  Take 2 tablets (650 mg) by mouth every 6 hours if needed for mild pain (1 - 3). Jarrett Harp MD  Active   aspirin 81 mg EC tablet [id number]  Take 1 tablet (81 mg) by mouth 2 times a day. Jarrett Harp MD  Active   calcium carbonate-vitamin D3 500 mg-5 mcg (200 unit) tablet [id number]  Take 2 tablets by mouth once daily. Schuyler Becerra MD  Active   donepezil (Aricept) 10 mg tablet [id number] No Take 1 tablet (10 mg) by mouth once daily. Trini Holloway MD 8/17/2024 am Active   escitalopram (Lexapro) 10 mg tablet [id number]  Take 1 tablet (10 mg) by mouth once daily. Jarrett Harp MD  Active   finasteride (Proscar) 5 mg tablet [id number] No Take 1 tablet (5 mg) by mouth once daily. Trini Holloway MD 8/17/2024 am Active   lisinopril 20 mg tablet [id number] No  Take 1 tablet (20 mg) by mouth once daily. Trini Holloway MD 8/17/2024 am Active   memantine (Namenda) 10 mg tablet [id number]  Take 1 tablet (10 mg) by mouth once daily. Jarrett Harp MD  Active   mirtazapine (Remeron) 15 mg tablet [id number]  Take 1 tablet (15 mg) by mouth once daily at bedtime. Jarrett Harp MD  Active   omeprazole (PriLOSEC) 10 mg DR capsule [id number] No Take 1 capsule (10 mg) by mouth once daily. Do not crush or chew. Trini Holloway MD 8/17/2024 am Active   ondansetron ODT (Zofran-ODT) 4 mg disintegrating tablet [id number]  Take 1 tablet (4 mg) by mouth every 8 hours if needed for nausea or vomiting. Jarrett Harp MD  Active   polyethylene glycol (Glycolax, Miralax) 17 gram packet [id number]  Take 17 g by mouth once daily. Jarrett Harp MD  Active   pravastatin (Pravachol) 10 mg tablet [id number] No Take 1 tablet (10 mg) by mouth once a day on Monday, Wednesday, and Friday. Trini Holloway MD 8/17/2024 am Active                    No Known Allergies    Social History     Socioeconomic History    Marital status:      Spouse name: Not on file    Number of children: Not on file    Years of education: Not on file    Highest education level: Not on file   Occupational History    Not on file   Tobacco Use    Smoking status: Never     Passive exposure: Never    Smokeless tobacco: Never   Vaping Use    Vaping status: Never Used   Substance and Sexual Activity    Alcohol use: Yes     Comment: OCCASIONALLY    Drug use: Never    Sexual activity: Defer   Other Topics Concern    Not on file   Social History Narrative    Not on file     Social Determinants of Health     Financial Resource Strain: Low Risk  (8/19/2024)    Overall Financial Resource Strain (CARDIA)     Difficulty of Paying Living Expenses: Not hard at all   Food Insecurity: No Food Insecurity (12/19/2019)    Received from [facility]    Hunger Vital Sign     Worried About Running Out of Food in the Last Year: Never  true     Ran Out of Food in the Last Year: Never true   Transportation Needs: No Transportation Needs (8/19/2024)    PRAPARE - Transportation     Lack of Transportation (Medical): No     Lack of Transportation (Non-Medical): No   Physical Activity: Inactive (6/14/2024)    Received from [facility]    Exercise Vital Sign     Days of Exercise per Week: 0 days     Minutes of Exercise per Session: 0 min   Stress: No Stress Concern Present (7/26/2020)    Received from [facility]    [employer] - Occupational Stress Questionnaire     Feeling of Stress : Only a little   Social Connections: Unknown (7/26/2020)    Received from [facility]    Social Connection and Isolation Panel [NHANES]     Frequency of Communication with Friends and Family: Twice a week     Frequency of Social Gatherings with Friends and Family: Twice a week     Attends Druze Services: Not on file     Active Member of Clubs or Organizations: Not on file     Attends Club or Organization Meetings: Not on file     Marital Status: Not on file   Intimate Partner Violence: Not on file   Housing Stability: Low Risk  (8/19/2024)    Housing Stability Vital Sign     Unable to Pay for Housing in the Last Year: No     Number of Times Moved in the Last Year: 1     Homeless in the Last Year: No       History reviewed. No pertinent surgical history.       Review of Systems:  30 point ROS reviewed and negative other than as listed in the HPI     Physical Exam:  Gen: The pt is A&Ox3, NAD, and appear state age and weight  Psychiatric: mood and affect are appropriate   Eyes: sclera are white, EOM grossly intact  ENT: MMM  Neck: supple, thyroid is midline  Respiratory: respirations are nonlabored, chest rise symmetric  CV: rate is regular by palpation of distal pulses  Abdomen: nondistended   Integument: no obvious cutaneous lesions noted. No signs of lymphangitis. No signs of systemic edema.   MSK: His right knee surgical  incision is well-healing however there is a large hematoma of the right knee with a large area of superficial breakdown with circumferential eschar forming.  There is surrounding redness and increased warmth.  There is no active drainage.  SILT throughout the leg intact. Intact plantarflexion and dorsiflexion. Foot warm and well perfused.      Imaging:  No imaging obtained at today's visit.     Assessment   84 y.o. male post-op from incision and drainage of traumatic right knee hematoma on 8/19/2024     Plan:  Sounds like shortly after discharge in the hospital patient had recurrence of his hematoma which would lead to a swelling blister that has since popped.  The area of previous blistering is quite large and has eschar forming.  I am really concerned about the viability of his skin at this point.  I discussed his case with the on-call surgeon and we decided that the best course of action is for patient to be seen in the ER and admitted to the hospital for I&D.  He could potentially also need plastics consult for wound management.  I discussed this plan with his  and their personal medical provider that presented today and they are both in agreement with the plan.

## 2024-09-07 ENCOUNTER — APPOINTMENT (OUTPATIENT)
Dept: CARDIOLOGY | Facility: HOSPITAL | Age: 84
DRG: 902 | End: 2024-09-07
Payer: MEDICARE

## 2024-09-07 ENCOUNTER — ANESTHESIA EVENT (OUTPATIENT)
Dept: OPERATING ROOM | Facility: HOSPITAL | Age: 84
End: 2024-09-07
Payer: MEDICARE

## 2024-09-07 ENCOUNTER — ANESTHESIA (OUTPATIENT)
Dept: OPERATING ROOM | Facility: HOSPITAL | Age: 84
End: 2024-09-07
Payer: MEDICARE

## 2024-09-07 PROBLEM — N40.0 BPH (BENIGN PROSTATIC HYPERPLASIA): Status: ACTIVE | Noted: 2024-09-07

## 2024-09-07 PROBLEM — G47.33 OSA (OBSTRUCTIVE SLEEP APNEA): Status: ACTIVE | Noted: 2024-09-07

## 2024-09-07 PROBLEM — N18.9 CHRONIC RENAL INSUFFICIENCY: Status: ACTIVE | Noted: 2024-09-07

## 2024-09-07 PROBLEM — I10 HTN (HYPERTENSION): Status: ACTIVE | Noted: 2024-09-07

## 2024-09-07 PROBLEM — K21.9 GASTROESOPHAGEAL REFLUX DISEASE: Status: ACTIVE | Noted: 2024-09-07

## 2024-09-07 PROBLEM — F03.90 DEMENTIA (MULTI): Status: ACTIVE | Noted: 2024-09-07

## 2024-09-07 LAB
ABO GROUP (TYPE) IN BLOOD: NORMAL
ALBUMIN SERPL BCP-MCNC: 4 G/DL (ref 3.4–5)
ALP SERPL-CCNC: 70 U/L (ref 33–136)
ALT SERPL W P-5'-P-CCNC: 9 U/L (ref 10–52)
ANION GAP SERPL CALC-SCNC: 12 MMOL/L (ref 10–20)
ANTIBODY SCREEN: NORMAL
APTT PPP: 34 SECONDS (ref 27–38)
AST SERPL W P-5'-P-CCNC: 19 U/L (ref 9–39)
BASOPHILS # BLD AUTO: 0.05 X10*3/UL (ref 0–0.1)
BASOPHILS NFR BLD AUTO: 0.9 %
BILIRUB SERPL-MCNC: 0.8 MG/DL (ref 0–1.2)
BUN SERPL-MCNC: 34 MG/DL (ref 6–23)
CALCIUM SERPL-MCNC: 9.3 MG/DL (ref 8.6–10.6)
CHLORIDE SERPL-SCNC: 107 MMOL/L (ref 98–107)
CO2 SERPL-SCNC: 26 MMOL/L (ref 21–32)
CREAT SERPL-MCNC: 1.63 MG/DL (ref 0.5–1.3)
EGFRCR SERPLBLD CKD-EPI 2021: 41 ML/MIN/1.73M*2
EOSINOPHIL # BLD AUTO: 0.21 X10*3/UL (ref 0–0.4)
EOSINOPHIL NFR BLD AUTO: 3.9 %
ERYTHROCYTE [DISTWIDTH] IN BLOOD BY AUTOMATED COUNT: 13 % (ref 11.5–14.5)
GLUCOSE SERPL-MCNC: 93 MG/DL (ref 74–99)
HCT VFR BLD AUTO: 29.8 % (ref 41–52)
HGB BLD-MCNC: 10.4 G/DL (ref 13.5–17.5)
IMM GRANULOCYTES # BLD AUTO: 0.02 X10*3/UL (ref 0–0.5)
IMM GRANULOCYTES NFR BLD AUTO: 0.4 % (ref 0–0.9)
INR PPP: 1 (ref 0.9–1.1)
LYMPHOCYTES # BLD AUTO: 1.16 X10*3/UL (ref 0.8–3)
LYMPHOCYTES NFR BLD AUTO: 21.8 %
MAGNESIUM SERPL-MCNC: 2.1 MG/DL (ref 1.6–2.4)
MCH RBC QN AUTO: 35 PG (ref 26–34)
MCHC RBC AUTO-ENTMCNC: 34.9 G/DL (ref 32–36)
MCV RBC AUTO: 100 FL (ref 80–100)
MONOCYTES # BLD AUTO: 0.47 X10*3/UL (ref 0.05–0.8)
MONOCYTES NFR BLD AUTO: 8.8 %
NEUTROPHILS # BLD AUTO: 3.41 X10*3/UL (ref 1.6–5.5)
NEUTROPHILS NFR BLD AUTO: 64.2 %
NRBC BLD-RTO: 0 /100 WBCS (ref 0–0)
PLATELET # BLD AUTO: 241 X10*3/UL (ref 150–450)
POTASSIUM SERPL-SCNC: 4.4 MMOL/L (ref 3.5–5.3)
PROT SERPL-MCNC: 6.8 G/DL (ref 6.4–8.2)
PROTHROMBIN TIME: 11.3 SECONDS (ref 9.8–12.8)
RBC # BLD AUTO: 2.97 X10*6/UL (ref 4.5–5.9)
RH FACTOR (ANTIGEN D): NORMAL
SODIUM SERPL-SCNC: 141 MMOL/L (ref 136–145)
WBC # BLD AUTO: 5.3 X10*3/UL (ref 4.4–11.3)

## 2024-09-07 PROCEDURE — 2500000004 HC RX 250 GENERAL PHARMACY W/ HCPCS (ALT 636 FOR OP/ED): Performed by: STUDENT IN AN ORGANIZED HEALTH CARE EDUCATION/TRAINING PROGRAM

## 2024-09-07 PROCEDURE — 36415 COLL VENOUS BLD VENIPUNCTURE: CPT

## 2024-09-07 PROCEDURE — 3700000001 HC GENERAL ANESTHESIA TIME - INITIAL BASE CHARGE: Performed by: ORTHOPAEDIC SURGERY

## 2024-09-07 PROCEDURE — 2500000004 HC RX 250 GENERAL PHARMACY W/ HCPCS (ALT 636 FOR OP/ED): Performed by: ANESTHESIOLOGIST ASSISTANT

## 2024-09-07 PROCEDURE — 87040 BLOOD CULTURE FOR BACTERIA: CPT

## 2024-09-07 PROCEDURE — 99222 1ST HOSP IP/OBS MODERATE 55: CPT

## 2024-09-07 PROCEDURE — 7100000002 HC RECOVERY ROOM TIME - EACH INCREMENTAL 1 MINUTE: Performed by: ORTHOPAEDIC SURGERY

## 2024-09-07 PROCEDURE — 2500000001 HC RX 250 WO HCPCS SELF ADMINISTERED DRUGS (ALT 637 FOR MEDICARE OP)

## 2024-09-07 PROCEDURE — 3600000007 HC OR TIME - EACH INCREMENTAL 1 MINUTE - PROCEDURE LEVEL TWO: Performed by: ORTHOPAEDIC SURGERY

## 2024-09-07 PROCEDURE — 11043 DBRDMT MUSC&/FSCA 1ST 20/<: CPT | Performed by: ORTHOPAEDIC SURGERY

## 2024-09-07 PROCEDURE — 3600000002 HC OR TIME - INITIAL BASE CHARGE - PROCEDURE LEVEL TWO: Performed by: ORTHOPAEDIC SURGERY

## 2024-09-07 PROCEDURE — 7100000001 HC RECOVERY ROOM TIME - INITIAL BASE CHARGE: Performed by: ORTHOPAEDIC SURGERY

## 2024-09-07 PROCEDURE — 85025 COMPLETE CBC W/AUTO DIFF WBC: CPT

## 2024-09-07 PROCEDURE — 1100000001 HC PRIVATE ROOM DAILY

## 2024-09-07 PROCEDURE — 99222 1ST HOSP IP/OBS MODERATE 55: CPT | Performed by: PHYSICIAN ASSISTANT

## 2024-09-07 PROCEDURE — 83735 ASSAY OF MAGNESIUM: CPT

## 2024-09-07 PROCEDURE — 3700000002 HC GENERAL ANESTHESIA TIME - EACH INCREMENTAL 1 MINUTE: Performed by: ORTHOPAEDIC SURGERY

## 2024-09-07 PROCEDURE — 2500000004 HC RX 250 GENERAL PHARMACY W/ HCPCS (ALT 636 FOR OP/ED): Mod: JZ

## 2024-09-07 PROCEDURE — 2720000007 HC OR 272 NO HCPCS: Performed by: ORTHOPAEDIC SURGERY

## 2024-09-07 PROCEDURE — 11046 DBRDMT MUSC&/FSCA EA ADDL: CPT | Performed by: ORTHOPAEDIC SURGERY

## 2024-09-07 PROCEDURE — 85610 PROTHROMBIN TIME: CPT

## 2024-09-07 PROCEDURE — 2500000005 HC RX 250 GENERAL PHARMACY W/O HCPCS: Performed by: ANESTHESIOLOGIST ASSISTANT

## 2024-09-07 PROCEDURE — 0JBN0ZZ EXCISION OF RIGHT LOWER LEG SUBCUTANEOUS TISSUE AND FASCIA, OPEN APPROACH: ICD-10-PCS | Performed by: ORTHOPAEDIC SURGERY

## 2024-09-07 PROCEDURE — 97605 NEG PRS WND THER DME<=50SQCM: CPT | Performed by: ORTHOPAEDIC SURGERY

## 2024-09-07 PROCEDURE — 80053 COMPREHEN METABOLIC PANEL: CPT

## 2024-09-07 RX ORDER — CEFAZOLIN SODIUM 2 G/100ML
2 INJECTION, SOLUTION INTRAVENOUS EVERY 12 HOURS
Status: CANCELLED | OUTPATIENT
Start: 2024-09-07

## 2024-09-07 RX ORDER — CEFAZOLIN 1 G/1
INJECTION, POWDER, FOR SOLUTION INTRAVENOUS AS NEEDED
Status: DISCONTINUED | OUTPATIENT
Start: 2024-09-07 | End: 2024-09-07

## 2024-09-07 RX ORDER — ONDANSETRON HYDROCHLORIDE 2 MG/ML
4 INJECTION, SOLUTION INTRAVENOUS ONCE AS NEEDED
Status: DISCONTINUED | OUTPATIENT
Start: 2024-09-07 | End: 2024-09-07 | Stop reason: HOSPADM

## 2024-09-07 RX ORDER — SODIUM CHLORIDE, SODIUM LACTATE, POTASSIUM CHLORIDE, CALCIUM CHLORIDE 600; 310; 30; 20 MG/100ML; MG/100ML; MG/100ML; MG/100ML
100 INJECTION, SOLUTION INTRAVENOUS CONTINUOUS
Status: DISCONTINUED | OUTPATIENT
Start: 2024-09-07 | End: 2024-09-07 | Stop reason: HOSPADM

## 2024-09-07 RX ORDER — PROPOFOL 10 MG/ML
INJECTION, EMULSION INTRAVENOUS AS NEEDED
Status: DISCONTINUED | OUTPATIENT
Start: 2024-09-07 | End: 2024-09-07

## 2024-09-07 RX ORDER — HYDROMORPHONE HYDROCHLORIDE 1 MG/ML
0.5 INJECTION, SOLUTION INTRAMUSCULAR; INTRAVENOUS; SUBCUTANEOUS EVERY 5 MIN PRN
Status: DISCONTINUED | OUTPATIENT
Start: 2024-09-07 | End: 2024-09-07 | Stop reason: HOSPADM

## 2024-09-07 RX ORDER — CEFAZOLIN SODIUM 2 G/100ML
2 INJECTION, SOLUTION INTRAVENOUS EVERY 8 HOURS
Status: COMPLETED | OUTPATIENT
Start: 2024-09-07 | End: 2024-09-08

## 2024-09-07 RX ORDER — HYDROMORPHONE HYDROCHLORIDE 1 MG/ML
0.2 INJECTION, SOLUTION INTRAMUSCULAR; INTRAVENOUS; SUBCUTANEOUS EVERY 5 MIN PRN
Status: DISCONTINUED | OUTPATIENT
Start: 2024-09-07 | End: 2024-09-07 | Stop reason: HOSPADM

## 2024-09-07 RX ORDER — LIDOCAINE HYDROCHLORIDE 10 MG/ML
0.1 INJECTION, SOLUTION INFILTRATION; PERINEURAL ONCE
Status: DISCONTINUED | OUTPATIENT
Start: 2024-09-07 | End: 2024-09-07 | Stop reason: HOSPADM

## 2024-09-07 RX ORDER — ROCURONIUM BROMIDE 10 MG/ML
INJECTION, SOLUTION INTRAVENOUS AS NEEDED
Status: DISCONTINUED | OUTPATIENT
Start: 2024-09-07 | End: 2024-09-07

## 2024-09-07 RX ORDER — NORETHINDRONE AND ETHINYL ESTRADIOL 0.5-0.035
KIT ORAL AS NEEDED
Status: DISCONTINUED | OUTPATIENT
Start: 2024-09-07 | End: 2024-09-07

## 2024-09-07 RX ORDER — LIDOCAINE HCL/PF 100 MG/5ML
SYRINGE (ML) INTRAVENOUS AS NEEDED
Status: DISCONTINUED | OUTPATIENT
Start: 2024-09-07 | End: 2024-09-07

## 2024-09-07 RX ORDER — METOPROLOL TARTRATE 1 MG/ML
INJECTION, SOLUTION INTRAVENOUS AS NEEDED
Status: DISCONTINUED | OUTPATIENT
Start: 2024-09-07 | End: 2024-09-07

## 2024-09-07 RX ORDER — FENTANYL CITRATE 50 UG/ML
INJECTION, SOLUTION INTRAMUSCULAR; INTRAVENOUS AS NEEDED
Status: DISCONTINUED | OUTPATIENT
Start: 2024-09-07 | End: 2024-09-07

## 2024-09-07 SDOH — HEALTH STABILITY: MENTAL HEALTH: CURRENT SMOKER: 0

## 2024-09-07 ASSESSMENT — PAIN - FUNCTIONAL ASSESSMENT
PAIN_FUNCTIONAL_ASSESSMENT: 0-10

## 2024-09-07 ASSESSMENT — COGNITIVE AND FUNCTIONAL STATUS - GENERAL
WALKING IN HOSPITAL ROOM: A LITTLE
MOBILITY SCORE: 20
MOVING TO AND FROM BED TO CHAIR: A LITTLE
MOVING TO AND FROM BED TO CHAIR: A LITTLE
CLIMB 3 TO 5 STEPS WITH RAILING: A LITTLE
DAILY ACTIVITIY SCORE: 24
STANDING UP FROM CHAIR USING ARMS: A LITTLE
MOBILITY SCORE: 20
STANDING UP FROM CHAIR USING ARMS: A LITTLE
WALKING IN HOSPITAL ROOM: A LITTLE
DAILY ACTIVITIY SCORE: 24
CLIMB 3 TO 5 STEPS WITH RAILING: A LITTLE

## 2024-09-07 ASSESSMENT — PAIN SCALES - GENERAL
PAINLEVEL_OUTOF10: 1
PAINLEVEL_OUTOF10: 0 - NO PAIN
PAINLEVEL_OUTOF10: 1
PAINLEVEL_OUTOF10: 1
PAINLEVEL_OUTOF10: 0 - NO PAIN
PAINLEVEL_OUTOF10: 1
PAINLEVEL_OUTOF10: 0 - NO PAIN

## 2024-09-07 NOTE — CARE PLAN
Problem: Fall/Injury  Goal: Not fall by end of shift  9/7/2024 0629 by Sydni Castaneda RN  Outcome: Progressing     Problem: Fall/Injury  Goal: Pace activities to prevent fatigue by end of the shift  9/7/2024 0629 by Sydni Castaneda RN  Outcome: Progressing     Problem: Pain  Goal: Turns in bed with improved pain control throughout the shift  9/7/2024 0629 by Sydni Castaneda RN  Outcome: Progressing   The patient's goals for the shift include      The clinical goals for the shift include      Patient arrived to unit at midnight. MD notified of elevated blood pressure, no new orders. NPO status maintained for possible OR today. Right knee wound open to air. No reports of pain per patient. Unable to perform admission assessment due to patient's hearing aids not being charged and orientation. Fall precautions in place. Patient self-catheterized as he does at home. Four eye skin assessment performed with ROSEY Lee. Patient resting comfortably at present.

## 2024-09-07 NOTE — SIGNIFICANT EVENT
Requested to perform pre-operative risk stratification by Orthopedic Surgery team. Please note that no recommendations are final until staffed by attending MD (evidenced by attestation placed on admission HPI).    RCRI for this patient is 0 which equates to a 3.9% 30 day risk of death, MI, or Cardiac arrest. Patient not anticipated to require further cardiac testing for pre-operative risk stratification.     If urgent risk stratification is required, Anesthesia consult can be placed or attending Orthopedic Surgeon can reach out via [employer] chat to Medicine attending for confirmation of above documentation after 08:00.      Again, above recommendations are preliminary until attendings have spoken or formally staffed.

## 2024-09-07 NOTE — ANESTHESIA POSTPROCEDURE EVALUATION
Patient: Gal Cramer    Procedure Summary       Date: 09/07/24 Room / Location: [facility] OR 06 / [facility] [place]    Anesthesia Start: 0806 Anesthesia Stop: 0939    Procedure: Debridement Knee (Right: Knee) Diagnosis:       Traumatic hematoma of right knee, initial encounter      (Traumatic hematoma of right knee, initial encounter [S80.01XA])    Surgeons: Pop Ya MD Responsible Provider: Raissa Yates MD    Anesthesia Type: general ASA Status: 3            Anesthesia Type: general    Vitals Value Taken Time   /82 09/07/24 0940   Temp 36 09/07/24 0940   Pulse 70 09/07/24 0938   Resp 16 09/07/24 0940   SpO2 100 % 09/07/24 0938   Vitals shown include unfiled device data.    Anesthesia Post Evaluation    Patient location during evaluation: PACU  Patient participation: complete - patient participated  Level of consciousness: awake and alert  Pain management: adequate  Airway patency: patent  Cardiovascular status: acceptable  Respiratory status: acceptable  Hydration status: acceptable  Postoperative Nausea and Vomiting: none      No notable events documented.

## 2024-09-07 NOTE — SIGNIFICANT EVENT
Assessment:   84 y.o. male s/p R knee I&D with wound vac application with Dr. Ya on 9/7.    Plan:  - Weightbearing Status: WBAT RLE  - Precautions: none  - Imaging: none  - Pain: per primary, recommend multimodal (Tylenol, oxycodone, dilaudid)  - Perioperative ABx: Ancef x 24 hr  - DVT PPx: per primary, recommend ASA 81 mg EC BID x 4 weeks  - Drain: WV set to suction  - Booker: absent  - Recommend consult PT/OT  - Appreciate excellent remainder of care per primary, Medicine  - Plastic Surgery consulted for intra-op wound evaluation and definitive wound closure    Arleen Adrian, PGY-2  Orthopedic Surgery Resident  Available via [employer]    This patient will be followed by Ortho Trauma team (All chat preferred):    1st call: Vance Reich, PGY-1  2nd call: Kyle Snyder, PGY-2  3rd call: Jude Escudero, PGY-3    On weekends and after 6PM:  At [facility]: Please reach out to the orthopaedic on-call resident ([phone])  At [facility]: Please reach out to the orthopaedic on-call RIANNA or resident (please refer to Qgenda)

## 2024-09-07 NOTE — HOSPITAL COURSE
Mr. Cramer is a 84 y.o. male with a hx of dementia, HTN, HLD, CKD 3, GERD, peripheral neuropathy, and BPH (-cath 3-4x every day)  who presented to the ED 9/6 from outpatient orthopedic office due to concern for recurrent right knee hematoma, admitted to Medicine 9/7 for elly-operative management.      Per chart review, admitted to [facility] 08/17-08/21 following unprovoked mechanical fall down 1 step resulting in R Knee hematoma which rapidly increased in size with a hemorrhagic bullae on the anteromedial portion of the R knee, causing lateralization of the patella. Patient evaluated by orthopedic surgery for hematoma management, and taken for I&D of hematoma on 8/19. Postoperatively, patient was doing well and endorsed minimal pain. DVT PPx was restarted with ASA 81 BID on POD1, and hemovac drain removed on 8/21. Discharged in stable condition to SNF.      Saw Orthopedic PA 09/06 for post-op check. At this time patient with increased swelling in right knee. Also with blister over the medial knee that popped a few days ago. Since blister has opened he has had a large wound to the medial knee. This has been managed with daily Xeroform dressings at SNF. Per Ortho appears that shortly after discharge patient had recurrence of his hematoma which lead to a swelling blister that has since popped. The area of previous blistering is quite large and has eschar forming. Discussed with on-call surgeon and recommenced ER visit for admission for repeat I&D. Per documentation he could potentially also need plastics consult for wound management due to concern for skin viability.      On interview, patient is relatively asx. He denies pain to the knee. Per , reports that patient has had increased swelling and erythema surrounding the knee despite daily dressing changes and ACE wrap placement. Patient overtly denies fevers/chill and purulent drainage. He does report RLE tenderness to palpation. He denies chest pain and  SOB. He has noted increased warmth to RLE.    On initial assessment pt was afebrile, HDS, with O2 sats appropriate on RA. Overall concerning for re-accumulation of right knee hematoma with overlying ulcer noted to be a ruptured blister. SIRS negative on admission, Blood cultures pending start IV Abx if positive. Successful I&D with ortho 9/7, intra-op cx pending. Pain has been controlled and patient allowed to weightbearing as tolerated. Pt has wound vac placed pending plastic surgery closure, Plastic Surgery recs: Plastic Surgery will Evaluate to decide on definitve vac management vs local advancement flap for tissue coverage. Holding lisinopril until after plastic surgery, if applicable. ANCEF completed 24 hours after surgery on 9/8.     Also, high clinical concern for RLE DVT at this time. Continuing home ASA for dvt ppx. DVT ultrasound not concerning for DVT 9/8. PT/OT following rec mod intensity. Plastic surgery with recommendations to allow wound to heal by secondary intention and follow up in 2 weeks.

## 2024-09-07 NOTE — H&P
HPI:  84 y.o. male with a hx of dementia, HTN, HLD, GERD, peripheral neuropathy, and BPH (self-cath 3-4x every day)  who presented to the ED today from outpatient orthopedic office due to concern for recurrent right knee hematoma, admitted to Medicine for elly-operative management.     Per chart review, admitted to [facility] 08/17-08/21 following unprovoked mechanical fall down 1 step resulting in R Knee hematoma which rapidly increased in size with a hemorrhagic bullae on the anteromedial portion of the R knee, causing lateralization of the patella. Patient evaluated by orthopedic surgery for hematoma management, and taken for I&D of hematoma on 8/19. Postoperatively, patient was doing well and endorsed minimal pain. DVT PPx was restarted with ASA 81 BID on POD1, and hemovac drain removed on 8/21. Discharged in stable condition to SNF.     Saw Orthopedic PA 09/06 for post-op check. At this time patient with increased swelling in right knee. Also with blister over the medial knee that popped a few days ago. Since blister has opened he has had a large wound to the medial knee. This has been managed with daily Xeroform dressings at SNF. Per Ortho appears that shortly after discharge patient had recurrence of his hematoma which lead to a swelling blister that has since popped. The area of previous blistering is quite large and has eschar forming. Discussed with on-call surgeon and recommenced ER visit for admission for repeat I&D. Per documentation he could potentially also need plastics consult for wound management due to concern for skin viability.     On interview, patient is relatively asx. He denies pain to the knee. Per , reports that patient has had increased swelling and erythema surrounding the knee despite daily dressing changes and ACE wrap placement. Patient overtly denies fevers/chill and purulent drainage. He does report RLE tenderness to palpation. He denies chest pain and SOB. He has noted increased  warmth to RLE.    In the ED:  - Vitals:  T 97.7, HR 71, /76,  RR 16, SpO2 97% on RA  - Labs:  CBC: WBC  5.6, Hgb 9.6, plt 232  BMP: Na 140, K 4.3, Cl 104, HCO3 27, BUN 33, Cr 1.87, glu 92  LFT: Ca 9.6, tprot 7.4, alb 4.3, alkphos 85, AST 14, ALT 8, tbili 0.9  Heme: PT 11.6, INR 1.0  ESR-23, CRP 0.18  Type and screen (collected )    - Imaging:  Knee XRAY:  IMPRESSION:  1. No acute fracture. Significant soft tissue swelling over the  anteromedial aspect of the knee.    CXR:  IMPRESSION:  1.  No acute cardiopulmonary process.    - ECG:  No ECG complete     In the ED, no interventions. Admitted to Medicine for medical management, plan for OR tomorrow with Orthopedics.     PMH:   As above      SurgHx:   History reviewed. No pertinent surgical history.      Allergies:   No Known Allergies     Social history:  Lives at home with partner. Lives in apartment building, with elevator, not a lot of fall hazards.  Max helps with medications. No other recent falls.   Denies EtOH, illicits, or tobacco use      Home Medications:  Tylenol 650 mg q6H PRN   Calcium carbonate vit d3 500 mg - 5 mcg (200 untis) tablet 2tabs every day   Donepezil 10 mg every day   Lexapro 10 mg every day --> patient reports not taking   Finasteride 5 mg every day   Memantine 10 mg every day   Mirtazapine 15 mg at bedtime   Prilosec 10 mg every day   Miralax 17 grams every day prn   Zofran PRN   Cardiac meds:   Aspirin 81 mg BID   Lisinopril 20 mg every day   Pravastatin 10 mg every day     Objective:  24 Hour Vitals  Temp:  [36.5 °C (97.7 °F)-36.7 °C (98.1 °F)] 36.7 °C (98.1 °F)  Heart Rate:  [68-82] 82  Resp:  [16-18] 16  BP: (130-163)/(72-85) 163/85    Temp (24hrs), Av.6 °C (97.9 °F), Min:36.5 °C (97.7 °F), Max:36.7 °C (98.1 °F)     24 hour Intake/Output  No intake or output data in the 24 hours ending 24 0002     Exam:  General: Resting comfortably in bed. Well developed, well nourished. Appears stated age. In no acute  distress   HEENT: Normocephalic and atraumatic. EOMI, sclera non-icteric, MMM, no JVD  Cardiovascular: RRR, no r/m/g  Pulmonary: Lungs clear to auscultation bilaterally. No wheezes/ rhonchi/ rales   GI: +BS, soft, non-tender, non-distended  : No suprapubic/ flank pain  Extremities: RLE with increased diameter compared to LLE. RLE with warmth and calf tenderness to palpation. Right knee with evidence of hematoma. Open wound on medial aspect or right knee, no purulence notes. Right knee has surrounding erythema and warmth to touch   Neurologic: CN II-XII grossly intact. No focal neurologic deficit   Psych: Pleasant. Appropriate mood and affect     Labs  CBC  Results from last 72 hours   Lab Units 09/06/24  2228 09/06/24  1421   WBC AUTO x10*3/uL 5.6 3.9*   HEMOGLOBIN g/dL 9.6* 12.4*   HEMATOCRIT % 27.8* 34.2*   PLATELETS AUTO x10*3/uL 232 218        BMP  Results from last 72 hours   Lab Units 09/06/24  1421   SODIUM mmol/L 140  140   POTASSIUM mmol/L 4.3  4.3   CHLORIDE mmol/L 104  104   BUN mg/dL 33*  33*   CREATININE mg/dL 1.87*  1.87*       Medications   Scheduled Medications  [Held by provider] aspirin, 81 mg, oral, BID  calcium carbonate-vitamin D3, 2 tablet, oral, Daily  donepezil, 10 mg, oral, Daily  finasteride, 5 mg, oral, Daily  [Held by provider] lisinopril, 20 mg, oral, Daily  memantine, 10 mg, oral, Daily  mirtazapine, 15 mg, oral, Nightly  pantoprazole, 40 mg, oral, Daily before breakfast  polyethylene glycol, 17 g, oral, Daily  [START ON 9/9/2024] pravastatin, 10 mg, oral, Every Mon/Wed/Fri     Continuous Medications    PRN Medications  PRN medications: acetaminophen, oxyCODONE, oxyCODONE      Assessment/Plan:  84 y.o. male with a hx of dementia, HTN, HLD, GERD, peripheral neuropathy, and BPH (self-cath 3-4x every day)  who presented to the ED today from outpatient orthopedic office, admitted to Medicine for elly-operative management. On initial assessment afebrile, HDS, with O2 sats appropriate  on RA. ED workup and chart review as documented above. Overall concerning for re accumulation of right knee hematoma. SIRS negative at this time, will hold off on Abx. Blood cultures collected, follow up. If positive plan to start IV Abx. Per Orthopedics, plan for intra-operative Cx. Will follow up. Also, high clinical concern for RLE DVT at this time. Will obtain STAT LE Duplex US. If found to have acute DVT of RLE will discuss findings and timing of anticoagulation with Orthopedics. Management of chronic problems as below.       #R Knee hematoma   :: Knee XR with no acute fracture. Noted to have significant soft tissue swelling over anteromedial aspect of the knee   :: ESR 23, CRP 0.18   :: Patient afebrile and without a leukocytosis   Plan:  - discussed pre-operative antibiotics with overnight Orthopedic resident. Plan for intra-operative cultures, would like to hold off for now. As patient is currently afebrile and without Leukocytosis or signs of OM an Xray will hold. 2 sets of blood cultures have been obtained, will plan to start IV Abx if return positive pre-operatively   - Plan for OR tomorrow with Ortho. NPO for now, holding chemical Ppx  - follow up blood culture and intra-operative cultures   - Tylenol 650 mg q6H PRN, oxycodone 5 mg q6H PRN for moderate pain, Oxycodone 10 mg q6H PRN for severe pain   - Type and screen ordered (09/06), coags with AM labs       #concern for RLE DVT   :: Has been on aspirin 81 mg BID for DVT Ppx, per  reports that has been given at facility   :: Exam with RLE erythema, warmth, and tenderness to palaption   Impression: High clinical suspicion for DVT of RLE   Plan:  - Duplex US  - if positive plan to start AC post OR when cleared by Orthopedics   - Holding DVT Ppx at this time for OR tomorrow. SCDs for now    #HTN  #HLD  - Hold home lisinopril elly-operatively. Can consider coreg or hydral   - continue with home Pravastatin     #GERD  - continue with home PPI      #BPH  - continue with home finasteride     #MDD  #Dementia   - continue with home donepezil, Memantine   - per patient not on lexapro     F: PRN for euvolemia   E: PRN K>4, Mg>2, P>3   N: NPO  A: PIV     Oxygen: RA  Abx: n/a  Gtts: n/a    DVT ppx: SCDs for now given procedure tomorrow, plan for chemical ppx thereafter   GI ppx: home ppi     Code Status: DNR/DNI of for ICU (confirmed on Admission)  Surrogate Medical Decision-maker:   Navi [phone]

## 2024-09-07 NOTE — ED PROVIDER NOTES
Emergency Department Provider Note        History of Present Illness     History provided by: Patient and Family Member  Limitations to History: None  External Records Reviewed with Brief Summary: Outpatient progress note from September 6, 2024 at 1:14 PM which showed the patient had an office visit to orthopedic surgery and was seen by Mel Crowell PA-C, the patient had noted increase of swelling in the knee over the past several days, patient also has a blister of the medial part of the knee and it did pop a few days ago while walking, the swelling getting worse and there is now redness and warmth in the area on the right knee.  There are no fevers or chills but the patient has complaint of.    HPI:  Gal Cramer is a 84 y.o. male presenting to the [facility] ED after visiting with the orthopedic surgery office earlier today over concern of his right knee and the healing process of it.  The patient says that he is experiencing a 1 out of 10 pain and only feels like that every once a while while he is trying to move his knee, there is no radiation of this pain.  The patient has not damaged his knee any further after his operation on August 19.  The initial injury happened to his knee on August 17.  The redness of the right knee was first noticed approximately 4 days ago.    Physical Exam   Triage vitals:  T 36.5 °C (97.7 °F)  HR 71  /76  RR 16  O2 97 % None (Room air)    Physical Exam  Vitals and nursing note reviewed.   Constitutional:       General: He is not in acute distress.     Appearance: He is well-developed.   HENT:      Head: Normocephalic and atraumatic.   Eyes:      Conjunctiva/sclera: Conjunctivae normal.   Cardiovascular:      Rate and Rhythm: Normal rate and regular rhythm.      Heart sounds: No murmur heard.  Pulmonary:      Effort: Pulmonary effort is normal. No respiratory distress.      Breath sounds: Normal breath sounds.   Abdominal:      Palpations: Abdomen is soft.       Tenderness: There is no abdominal tenderness.   Musculoskeletal:         General: No swelling.      Cervical back: Neck supple.      Right knee: Swelling and erythema present. Decreased range of motion. Tenderness present.      Left knee: Normal.      Right lower leg: Tenderness present.      Left lower leg: No tenderness.        Legs:    Skin:     General: Skin is warm and dry.      Capillary Refill: Capillary refill takes less than 2 seconds.   Neurological:      Mental Status: He is alert.   Psychiatric:         Mood and Affect: Mood normal.          Medical Decision Making & ED Course   Medical Decision Makin y.o. male presenting to the ED after going to the orthopedic surgery clinic and them saying that he should be examined at the hospital.    Basic labs were drawn to assess if systemic infection is occurring due to the erythema and warmth of the site of injury to this patient.    An x-ray was performed in the emergency department to assess if any further damage had occurred to the knee.  Orthopedic surgery was consulted to assess the patient bedside and orthopedic surgery decided that they will be able to treat this patient and operation.  Due to the patient being over 75 years old the patient has to be admitted to medicine, the patient has no new and acute medical complications that need to be managed in comparison to the last time he was operated on.    The patient was admitted to the medicine team.  The patient remained hemodynamically stable the entire time at the emergency department    Labs ordered: Type and screen, coagulation screen, CBC, C-reactive protein, BMP, ESR, CMP, PT/INR, CMP    Lab findings: Lab findings discussed in the ED course    Imaging ordered: X-ray of the knee    Imaging findings: Imaging findings discussed in the ED course    Differential diagnosis: Complication to the right knee post operation, infection of the incision site postoperation, cellulitis, abscess,  bursitis    Treatment decisions: No medications given    Disposition: Patient was admitted to have an operation performed    Reassessment and response to treatment: Patient felt comfortable and in little pain, the patient was    ----      Differential diagnoses considered include but are not limited to: Complication to the right knee postoperation, infection of the incision site postoperation, cellulitis, abscess, bursitis     Social Determinants of Health which Significantly Impact Care: None identified     EKG Independent Interpretation: EKG not obtained    Independent Result Review and Interpretation: Relevant laboratory and radiographic results were reviewed and independently interpreted by myself.  As necessary, they are commented on in the ED Course.    Chronic conditions affecting the patient's care: As documented above in MDM    The patient was discussed with the following consultants/services: Admission Coordinator who accepted the patient for admission and the on-call orthopedic surgery team    Care Considerations: As documented above in [facility]    ED Course:  ED Course as of 09/07/24 0433   Sat Sep 07, 2024   0421 CBC with Differential(!)  Patient has anemia but hemoglobin is improved from before. [JO ANN]   0423 Comprehensive Metabolic Panel(!)  Patient CMP is showing a decreased GFR and kidney function but is baseline for the patient [JO ANN]   0423 CBC(!)  CBC performed shows hemoglobin more in line with a baseline [JO ANN]   0424 XR knee right 4+ views  X-ray of the knee shows no acute fracture but there is soft tissue swelling over the anteromedial aspect of the knee [JO ANN]      ED Course User Index  [JO ANN] Segundo Oliva, DO         Diagnoses as of 09/07/24 0433   Pain in lateral portion of left knee     Disposition   As a result of their workup, the patient will require admission to the hospital.  The patient was informed of his diagnosis.  The patient was given the opportunity to ask questions and I answered them.  The patient agreed to be admitted to the hospital.    Procedures   Procedures    Patient seen and discussed with ED attending physician.    Segundo Oliva,   Emergency Medicine     Segundo Oliva DO  Resident  09/07/24 0437

## 2024-09-07 NOTE — ANESTHESIA PROCEDURE NOTES
Airway  Date/Time: 9/7/2024 8:19 AM  Urgency: elective    Airway not difficult    Staffing  Performed: JR   Authorized by: Raissa Yates MD    Performed by: TANNER Horowitz  Patient location during procedure: OR    Indications and Patient Condition  Indications for airway management: anesthesia  Spontaneous Ventilation: absent  Sedation level: deep  Preoxygenated: yes  Patient position: sniffing  MILS not maintained throughout  Mask difficulty assessment: 1 - vent by mask  Planned trial extubation    Final Airway Details  Final airway type: endotracheal airway      Successful airway: ETT  Cuffed: yes   Successful intubation technique: direct laryngoscopy  Facilitating devices/methods: intubating stylet  Endotracheal tube insertion site: oral  Blade: Veda  Blade size: #4  ETT size (mm): 7.5  Cormack-Lehane Classification: grade I - full view of glottis  Placement verified by: capnometry   Measured from: teeth  ETT to teeth (cm): 23  Number of attempts at approach: 1  Number of other approaches attempted: 0

## 2024-09-07 NOTE — CARE PLAN
Problem: Fall/Injury  Goal: Not fall by end of shift  Outcome: Progressing     Problem: Fall/Injury  Goal: Verbalize understanding of risk factor reduction measures to prevent injury from fall in the home  Outcome: Progressing     Problem: Pain  Goal: Takes deep breaths with improved pain control throughout the shift  Outcome: Progressing   The patient's goals for the shift include      The clinical goals for the shift include      Patient arrived to unit at 00:00 am. MD notified of elevated blood pressure, no new orders. No reports of pain at present. Patient self catheterized self with home supplies, output monitored. Wound to right knee open to air. NPO status maintained. Patient resting comfortably at present.

## 2024-09-07 NOTE — OP NOTE
Debridement Knee (R) Operative Note     Date: 2024  OR Location: [facility] OR    Name: Gal Cramer, : 1940, Age: 84 y.o., MRN: [id number], Sex: male    Diagnosis  Pre-op Diagnosis      * Traumatic hematoma of right knee, initial encounter [S80.01XA] Post-op Diagnosis     * Traumatic hematoma of right knee, initial encounter [S80.01XA]     Procedures  Debridement Knee  48680 - AR ARTHRS KNEE DEBRIDEMENT/SHAVING ARTCLR CRTLG  Irrigation debridement skin subcutaneous tissue fascia 6 cm x 5 cm  Application of wound VAC    Surgeons      * Pop Ya - Primary    Resident/Fellow/Other Assistant:  Surgeons and Role:  * No surgeons found with a matching role *    Procedure Summary  Anesthesia: General  ASA: III  Anesthesia Staff: Anesthesiologist: MD NOE Gonzales-AA: TANNER Horowitz  JR: Glenroy Mccormick  Estimated Blood Loss: 25mL  Intra-op Medications:   Administrations occurring from 0745 to 0950 on 24:   Medication Name Total Dose   oxyCODONE (Roxicodone) immediate release tablet 10 mg Cannot be calculated   oxyCODONE (Roxicodone) immediate release tablet 5 mg Cannot be calculated   lactated Ringer's infusion Cannot be calculated              Anesthesia Record               Intraprocedure I/O Totals          Output    Urine 400 mL    Total Output 400 mL          Specimen: No specimens collected     Staff:   Circulator: Angie Kellre Person: Nadine         Drains and/or Catheters: * None in log *    Tourniquet Times:         Implants:     Findings: Necrotic skin subcutaneous tissue    Indications: Gal Cramer is an 84 y.o. male who is having surgery for Traumatic hematoma of right knee, initial encounter [S80.01XA].  Patient had a hematoma that was treated by my partner few weeks prior.  The goal is at this time to try to drain the hematoma and hopefully to save the skin that was threatened at that time.  Unfortunately his hematoma reaccumulated and this came underneath it  became a centimeter pressure ulcer.  It was discussed prior that this might occur and the first surgery was attempted to avoid this but unfortunately the underlying tissue did not hold up and became necrotic.  Decision was made to take him back to excise this necrotic skin and consult plastic surgery for intervention.    The patient was seen in the preoperative area. The risks, benefits, complications, treatment options, non-operative alternatives, expected recovery and outcomes were discussed with the patient. The possibilities of reaction to medication, pulmonary aspiration, injury to surrounding structures, bleeding, recurrent infection, the need for additional procedures, failure to diagnose a condition, and creating a complication requiring transfusion or operation were discussed with the patient. The patient concurred with the proposed plan, giving informed consent.  The site of surgery was properly noted/marked if necessary per policy. The patient has been actively warmed in preoperative area. Preoperative antibiotics have been ordered and given within 1 hours of incision. Venous thrombosis prophylaxis have been ordered including unilateral sequential compression device    Procedure Details: Patient was brought to the operating room and timeout performed.  Patient was under general anesthesia.  Patient given preoperative antibiotics and TXA.  Draped in usual sterile fashion a preincision pause occurred.    There is a 5 cm x 6 cm necrotic area of tissue.  On the medial aspect of the knee.  I started by starting to ellipse out the tissue so that I can get a look underneath the plate was all basically necrotic and fibrinous tissue underneath that was necrosing from inside outward.  This was made to ellipse the entire biopsy we are by 6 cm area.  Plastic surgery was consulted intraoperatively.  I expressed the ton of hematoma and which was all coagulated.  I then copiously irrigated.  Plastic surgery took  pictures in the operating room.  I did not see any substantial bleeding from any 1 area just a general healthy wound bed underneath.  Once I debrided all the necrotic tissue.  I then copiously irrigated.  Irrisept was used.  Vancomycin tobramycin powder placed in the wound.  Thrombin was placed in the wound.  Surgicel powder was also placed in the wound.  The VAC was then placed to fill in the hole and placed 225 mmHg.  Soft dressing was in place.  Complications:  None; patient tolerated the procedure well.    Disposition: PACU - hemodynamically stable.  Condition: stable         Additional Details: Weight-bear as tolerated right lower extremity.  Plan will be for plastic surgery follow-up and intervention.    Attending Attestation: I was present and scrubbed for the entire procedure.    Pop Ya  Phone Number: [phone]

## 2024-09-07 NOTE — H&P
[facility] Department of Orthopaedic Surgery   Surgical History & Physical <30 Days    Reason for Surgery: R knee wound dehiscence   Planned Procedure: R knee I&D    History & Physical Reviewed:  I have reviewed the History and Physical for obtained within the last 30 days. Relevant findings and updates are noted below:  No significant changes.    Home medications were reviewed with significant updates noted below:  No significant changes.    ERAS patient?: No    COVID-19 Risk Consent:   Surgeon has reviewed the key risks related to tawanna COVID-19 and subsequent sequelae.     09/07/24 at 12:48 AM - Jose Mckeon MD

## 2024-09-07 NOTE — CONSULTS
Orthopaedic Surgery Consult H&P    HPI:   Orthopaedic Problems/Injuries: R knee wound dehiscence   Other Injuries: none    84 y.o. male PMH (dementia, CKD) presents s/p R thigh hematoma drainage on 8/19. sustaining above. Denies numbness, tingling of the affected limb. Has open wound on the affected limb.     PMH: per above/EMR  PSH: per above/EMR  SocHx:      -  Denies tobacco use      -  Denies EtOH use      -  Denies other drug use  FamHx:  Non-contributory to this patient's acute orthopaedic problem.   Allergies: Reviewed in EMR  Meds: Reviewed in EMR    ROS      - 14 point ROS negative except as above    Physical Exam:  Gen: AOx3, NAD  HEENT: normocephalic atraumatic  Psych: appropriate mood and affect  Resp: nonlabored breathing  Cardiac: Extremities WWP, RRR to peripheral palpation  Neuro: CN 2-12 grossly intact  Skin: no rashes    Right lower extremity:  - Large hematoma over medial knee w large area of superficial skin breakdown and circumferential eschar. No active drainage.  - Tender to palpation over R knee  - Fires EHL/DF/PF.  - Sensation intact to light touch in sural, saphenous, superficial/deep peroneal, tibial nerve distributions.  - 2+ DP pulse, < 2 seconds capillary refill.    A full secondary exam was performed and all relevant findings discussed and noted above.    Imaging:  AP and Lateral films of R knee demonstrated no acute fx or osseous abnormality. Significant soft tissue swelling present over anteromedial knee.    Assessment:  Orthopaedic Problems/Injuries: R knee wound dehiscence    84M (dementia, CKD) s/p R thigh hematoma drainage (8/19). On exam, NVI. Large hematoma over medial knee w large area of superficial skin breakdown and circumferential eschar. No active drainage.    Plan:  -  C/p R knee I&D & wound vac application w Dr. Ya 9/7.   - Plastic Surgery will perform intra op eval of R knee wound.  - WB: WBAT RLE  - Abx: Per Primary  - Diet: Ok for regular diet after OR today from  orthopaedic standpoint  - DVT: Per Primary  - Please obtain all preop labs (CBC,BMP,EKG, CXR, Coags, Type and Screen)  - Jhony: no    - Dispo: Per Primary    Monet José, DO  Orthopaedic Surgery PGY-1  Epic Chat Preferred

## 2024-09-07 NOTE — CONSULTS
Department of Plastic and Reconstructive Surgery  Consult Note    Patient Name: Gal Cramer  MRN: [id number]  Date:  09/07/24     HPI   Gal Cramer is a 84 y.o. male with a past medical history of dementia, HTN, HLD, GERD, peripheral neuropathy, and BPH (self-cath 3-4x every day)  who presented to the ED 9/6 from the outpatient orthopedic office given c/f recurrent right knee hematoma. Patient was admitted to [facility] 08/17-08/21 following unprovoked mechanical fall down 1 step resulting in R Knee hematoma which rapidly increased in size with a hemorrhagic bullae on the anteromedial portion of the R knee, causing lateralization of the patella. Patient evaluated by orthopedic surgery for hematoma management, and taken for I&D of hematoma on 8/19. Hemovac drain removed on 8/21 and patient discharged in stable condition to SNF. Saw orthopedics 9/6 for post-op check which was c/f hematoma recurrence with concerning overlying skin viability and patient recommended to present to ED for admission for repeat I&D. Patient admitted to Medicine for elly-operative management. He returned to the OR today with orthopedic surgery for R knee I&D with application of wound vac. Plastic surgery consulted intraoperatively for wound assessment and possible assistance with eventual wound coverage/closure.    Past Medical History:   Diagnosis Date    Dementia (Multi)     GERD (gastroesophageal reflux disease)     HL (hearing loss)     Hyperlipidemia     Hypertension     Neurogenic bladder     Vision loss      History reviewed. No pertinent surgical history.  No Known Allergies    Current Facility-Administered Medications:     acetaminophen (Tylenol) tablet 650 mg, 650 mg, oral, q6h PRN, Dayron Mesa MD    [Held by provider] aspirin EC tablet 81 mg, 81 mg, oral, BID, Dayron Mesa MD    calcium carbonate-vitamin D3 500 mg-5 mcg (200 unit) per tablet 2 tablet, 2 tablet, oral, Daily, Dayron Mesa MD    donepezil (Aricept)  tablet 10 mg, 10 mg, oral, Daily, Dayron Mesa MD    finasteride (Proscar) tablet 5 mg, 5 mg, oral, Daily, Dayron Mesa MD    [Held by provider] lisinopril tablet 20 mg, 20 mg, oral, Daily, Dayron Mesa MD    memantine (Namenda) tablet 10 mg, 10 mg, oral, Daily, Dayron Mesa MD    mirtazapine (Remeron) tablet 15 mg, 15 mg, oral, Nightly, Dayron Mesa MD    [Transfer Hold] oxyCODONE (Roxicodone) immediate release tablet 10 mg, 10 mg, oral, q6h PRN, Dayron Mesa MD    [Transfer Hold] oxyCODONE (Roxicodone) immediate release tablet 5 mg, 5 mg, oral, q6h PRN, Dayron Mesa MD    pantoprazole (ProtoNix) EC tablet 40 mg, 40 mg, oral, Daily before breakfast, Dayron Mesa MD, 40 mg at 09/07/24 0614    polyethylene glycol (Glycolax, Miralax) packet 17 g, 17 g, oral, Daily, Dayron Mesa MD    [START ON 9/9/2024] pravastatin (Pravachol) tablet 10 mg, 10 mg, oral, Every Mon/Wed/Fri, Dayron Mesa MD    Facility-Administered Medications Ordered in Other Encounters:     ceFAZolin (Ancef) injection, , intravenous, PRN, TANNER Horowitz, 2 g at 09/07/24 0815    ePHEDrine injection, , intravenous, PRN, TANNER Horowitz, 10 mg at 09/07/24 0833    fentaNYL PF (Sublimaze) injection, , intravenous, PRN, TANNER Horowitz, 50 mcg at 09/07/24 0815    lidocaine (cardiac) (Xylocaine) injection, , intravenous, PRN, TANNER Horowitz, 80 mg at 09/07/24 0815    metoprolol tartrate (Lopressor) injection, , intravenous, PRN, TANNER Horowitz, 5 mg at 09/07/24 0825    propofol (Diprivan) injection, , intravenous, PRN, TANNER Horowitz, 100 mg at 09/07/24 0815    rocuronium (ZeMuron) injection, , intravenous, PRN, TANNER Horowitz, 50 mg at 09/07/24 0815   No family history on file.  Social History     Tobacco Use    Smoking status: Never     Passive exposure: Never    Smokeless tobacco: Never   Vaping Use    Vaping status: Never Used   Substance Use Topics    Alcohol use: Yes      "Comment: OCCASIONALLY    Drug use: Never     Review of Systems   Unable to perform ROS: Intubated   Patient evaluated intraoperatively.      Objective   /81   Pulse 86   Temp 36.2 °C (97.2 °F) (Temporal)   Resp 16   Ht 1.753 m (5' 9\")   Wt 75.3 kg (166 lb)   SpO2 99%   BMI 24.51 kg/m²      Physical Exam   Constitutional: Intubated and sedated, patient evaluated intraoperatively.  Eyes: Closed.  Cardiovascular: Normal rate per cardiac monitor.  Respiratory/Thorax: Intubated.  Gastrointestinal: Unable to assess intraoperatively.  Extremities: Approximately 5 cm x 6 cm area of necrotic, nonviable tissue overlying the right upper anterior knee.  Tissue debrided intraoperatively revealing a 6 cm circular wound with underlying healthy wound bed.  Neurological: Sedated.  Psychological: Sedated.  Skin: Unable to fully assess, patient evaluated intraoperatively.  See extremity exam.    Diagnostics   Results for orders placed or performed during the hospital encounter of 09/06/24 (from the past 24 hour(s))   Type And Screen   Result Value Ref Range    ABO TYPE A     Rh TYPE NEG     ANTIBODY SCREEN NEG    CBC with Differential   Result Value Ref Range    WBC 3.9 (L) 4.4 - 11.3 x10*3/uL    nRBC 0.0 0.0 - 0.0 /100 WBCs    RBC 3.66 (L) 4.50 - 5.90 x10*6/uL    Hemoglobin 12.4 (L) 13.5 - 17.5 g/dL    Hematocrit 34.2 (L) 41.0 - 52.0 %    MCV 93 80 - 100 fL    MCH 33.9 26.0 - 34.0 pg    MCHC 36.3 (H) 32.0 - 36.0 g/dL    RDW 12.7 11.5 - 14.5 %    Platelets 218 150 - 450 x10*3/uL    Neutrophils % 67.3 40.0 - 80.0 %    Immature Granulocytes %, Automated 0.3 0.0 - 0.9 %    Lymphocytes % 19.9 13.0 - 44.0 %    Monocytes % 8.2 2.0 - 10.0 %    Eosinophils % 2.8 0.0 - 6.0 %    Basophils % 1.5 0.0 - 2.0 %    Neutrophils Absolute 2.63 1.60 - 5.50 x10*3/uL    Immature Granulocytes Absolute, Automated 0.01 0.00 - 0.50 x10*3/uL    Lymphocytes Absolute 0.78 (L) 0.80 - 3.00 x10*3/uL    Monocytes Absolute 0.32 0.05 - 0.80 x10*3/uL    " Eosinophils Absolute 0.11 0.00 - 0.40 x10*3/uL    Basophils Absolute 0.06 0.00 - 0.10 x10*3/uL   Comprehensive Metabolic Panel   Result Value Ref Range    Glucose 92 74 - 99 mg/dL    Sodium 140 136 - 145 mmol/L    Potassium 4.3 3.5 - 5.3 mmol/L    Chloride 104 98 - 107 mmol/L    Bicarbonate 27 21 - 32 mmol/L    Anion Gap 13 10 - 20 mmol/L    Urea Nitrogen 33 (H) 6 - 23 mg/dL    Creatinine 1.87 (H) 0.50 - 1.30 mg/dL    eGFR 35 (L) >60 mL/min/1.73m*2    Calcium 9.6 8.6 - 10.6 mg/dL    Albumin 4.3 3.4 - 5.0 g/dL    Alkaline Phosphatase 85 33 - 136 U/L    Total Protein 7.4 6.4 - 8.2 g/dL    AST 14 9 - 39 U/L    Bilirubin, Total 0.9 0.0 - 1.2 mg/dL    ALT 8 (L) 10 - 52 U/L   Protime-INR   Result Value Ref Range    Protime 11.6 9.8 - 12.8 seconds    INR 1.0 0.9 - 1.1   Sedimentation rate, automated   Result Value Ref Range    Sedimentation Rate 23 (H) 0 - 20 mm/h   C-reactive protein   Result Value Ref Range    C-Reactive Protein 0.18 <1.00 mg/dL   Basic Metabolic Panel   Result Value Ref Range    Glucose 92 74 - 99 mg/dL    Sodium 140 136 - 145 mmol/L    Potassium 4.3 3.5 - 5.3 mmol/L    Chloride 104 98 - 107 mmol/L    Bicarbonate 27 21 - 32 mmol/L    Anion Gap 13 10 - 20 mmol/L    Urea Nitrogen 33 (H) 6 - 23 mg/dL    Creatinine 1.87 (H) 0.50 - 1.30 mg/dL    eGFR 35 (L) >60 mL/min/1.73m*2    Calcium 9.6 8.6 - 10.6 mg/dL   CBC   Result Value Ref Range    WBC 5.6 4.4 - 11.3 x10*3/uL    nRBC 0.0 0.0 - 0.0 /100 WBCs    RBC 2.99 (L) 4.50 - 5.90 x10*6/uL    Hemoglobin 9.6 (L) 13.5 - 17.5 g/dL    Hematocrit 27.8 (L) 41.0 - 52.0 %    MCV 93 80 - 100 fL    MCH 32.1 26.0 - 34.0 pg    MCHC 34.5 32.0 - 36.0 g/dL    RDW 12.6 11.5 - 14.5 %    Platelets 232 150 - 450 x10*3/uL   Coagulation Screen   Result Value Ref Range    Protime 11.0 9.8 - 12.8 seconds    INR 1.0 0.9 - 1.1    aPTT 33 27 - 38 seconds   Type And Screen   Result Value Ref Range    ABO TYPE A     Rh TYPE NEG     ANTIBODY SCREEN NEG    CBC and Auto Differential   Result  Value Ref Range    WBC 5.3 4.4 - 11.3 x10*3/uL    nRBC 0.0 0.0 - 0.0 /100 WBCs    RBC 2.97 (L) 4.50 - 5.90 x10*6/uL    Hemoglobin 10.4 (L) 13.5 - 17.5 g/dL    Hematocrit 29.8 (L) 41.0 - 52.0 %     80 - 100 fL    MCH 35.0 (H) 26.0 - 34.0 pg    MCHC 34.9 32.0 - 36.0 g/dL    RDW 13.0 11.5 - 14.5 %    Platelets 241 150 - 450 x10*3/uL    Neutrophils % 64.2 40.0 - 80.0 %    Immature Granulocytes %, Automated 0.4 0.0 - 0.9 %    Lymphocytes % 21.8 13.0 - 44.0 %    Monocytes % 8.8 2.0 - 10.0 %    Eosinophils % 3.9 0.0 - 6.0 %    Basophils % 0.9 0.0 - 2.0 %    Neutrophils Absolute 3.41 1.60 - 5.50 x10*3/uL    Immature Granulocytes Absolute, Automated 0.02 0.00 - 0.50 x10*3/uL    Lymphocytes Absolute 1.16 0.80 - 3.00 x10*3/uL    Monocytes Absolute 0.47 0.05 - 0.80 x10*3/uL    Eosinophils Absolute 0.21 0.00 - 0.40 x10*3/uL    Basophils Absolute 0.05 0.00 - 0.10 x10*3/uL   Comprehensive Metabolic Panel   Result Value Ref Range    Glucose 93 74 - 99 mg/dL    Sodium 141 136 - 145 mmol/L    Potassium 4.4 3.5 - 5.3 mmol/L    Chloride 107 98 - 107 mmol/L    Bicarbonate 26 21 - 32 mmol/L    Anion Gap 12 10 - 20 mmol/L    Urea Nitrogen 34 (H) 6 - 23 mg/dL    Creatinine 1.63 (H) 0.50 - 1.30 mg/dL    eGFR 41 (L) >60 mL/min/1.73m*2    Calcium 9.3 8.6 - 10.6 mg/dL    Albumin 4.0 3.4 - 5.0 g/dL    Alkaline Phosphatase 70 33 - 136 U/L    Total Protein 6.8 6.4 - 8.2 g/dL    AST 19 9 - 39 U/L    Bilirubin, Total 0.8 0.0 - 1.2 mg/dL    ALT 9 (L) 10 - 52 U/L   Magnesium   Result Value Ref Range    Magnesium 2.10 1.60 - 2.40 mg/dL   Coagulation Screen   Result Value Ref Range    Protime 11.3 9.8 - 12.8 seconds    INR 1.0 0.9 - 1.1    aPTT 34 27 - 38 seconds   Blood Culture    Specimen: Peripheral Venipuncture; Blood culture   Result Value Ref Range    Blood Culture Loaded on Instrument - Culture in progress    Blood Culture    Specimen: Peripheral Venipuncture; Blood culture   Result Value Ref Range    Blood Culture Loaded on Instrument -  Culture in progress      XR knee right 4+ views  Result Date: 9/6/2024  Interpreted By:  Rashaad Tinsley and Beyersdorf Conner STUDY: XR KNEE RIGHT 4+ VIEWS; ;  9/6/2024 10:30 pm   INDICATION: Signs/Symptoms:Pain, increased swelling.     COMPARISON: Knee x-ray 08/17/2024   ACCESSION NUMBER(S): [id number]   ORDERING CLINICIAN: EVER MÉNDEZ   FINDINGS: Four views of the right knee are provided.   No acute fracture or malalignment. There is significant soft tissue edema over the anteromedial aspect of the knee. There is no significant knee joint effusion. No radiopaque retained foreign body. Vascular calcifications noted.     1. No acute fracture. Significant soft tissue swelling over the anteromedial aspect of the knee.     I personally reviewed the image(s)/study and resident interpretation. I agree with the findings as stated by resident Denny Castellanos. Data analyzed and images interpreted at [facility], [place], [place].   MACRO: None   Signed by: Rashaad Tinsley 9/6/2024 10:50 PM Dictation workstation:   [id number]    XR chest 1 view  Result Date: 9/6/2024  Interpreted By:  Rashaad Tinsley and Beyersdorf Conner STUDY: XR CHEST 1 VIEW;  9/6/2024 10:30 pm   INDICATION: Signs/Symptoms:pre op eval.   COMPARISON: Single-view chest 08/18/2024   ACCESSION NUMBER(S): [id number]   ORDERING CLINICIAN: LINDSAY URBINA   FINDINGS: AP radiograph of the chest was provided.     CARDIOMEDIASTINAL SILHOUETTE: Cardiomediastinal silhouette is normal in size and configuration.   LUNGS: No focal consolidation, pleural effusion, or pneumothorax. Stable mild interstitial prominence, likely chronic.   ABDOMEN: No remarkable upper abdominal findings.   BONES: Degenerative changes of the spine and AC joints.     1.  No acute cardiopulmonary process.   I personally reviewed the image(s)/study and resident interpretation. I agree with the findings as stated by resident Denny Castellanos. Data analyzed  and images interpreted at [facility], [place], [place].   MACRO: None   Signed by: Rashaad Tinsley 9/6/2024 10:47 PM Dictation workstation:   [id number]    Current Medications  Scheduled medications  [Held by provider] aspirin, 81 mg, oral, BID  calcium carbonate-vitamin D3, 2 tablet, oral, Daily  donepezil, 10 mg, oral, Daily  finasteride, 5 mg, oral, Daily  [Held by provider] lisinopril, 20 mg, oral, Daily  memantine, 10 mg, oral, Daily  mirtazapine, 15 mg, oral, Nightly  pantoprazole, 40 mg, oral, Daily before breakfast  polyethylene glycol, 17 g, oral, Daily  [START ON 9/9/2024] pravastatin, 10 mg, oral, Every Mon/Wed/Fri      Continuous medications     PRN medications  PRN medications: acetaminophen, [Transfer Hold] oxyCODONE, [Transfer Hold] oxyCODONE     Assessment   Gal Cramer is a 84 y.o. male with a past medical history of dementia, HTN, HLD, GERD, peripheral neuropathy, and BPH (self-cath 3-4x every day)  who presented to the ED 9/6 from the outpatient orthopedic office given c/f recurrent right knee hematoma. Patient was admitted to [facility] 08/17-08/21 following unprovoked mechanical fall down 1 step resulting in R knee hematoma which rapidly increased in size with a hemorrhagic bullae on the anteromedial portion of the R knee, causing lateralization of the patella. Patient evaluated by orthopedic surgery for hematoma management, and taken for I&D of hematoma on 8/19 which has been c/b hematoma recurrence with overlying wound/tissue necrosis. Patient re-admitted and returned to the OR with orthopedic surgery on 9/7 for R knee I&D with application of wound vac. Plastic surgery consulted intraoperatively for wound assessment and possible assistance with eventual wound coverage/closure.    Plan/Recommendations  - Plans for eventual wound coverage/reconstruction pending finalized staffing with plastic surgery attending  - Maintain interim wound temporization via wound  VAC per orthopedic surgery  - Antibiotics per primary (currently on elly-op IV Ancef)  - Follow-up intraoperative cultures obtained 9/7  - WB restrictions per orthopedic surgery recommendations  - Appreciate remaining supportive care per primary service  - Plastic Surgery will continue to follow      Iman Cantu PA-C  Plastic and Reconstructive Surgery   Pager #[phone]  Team phones: [phone]

## 2024-09-07 NOTE — CARE PLAN
The clinical goals for the shift include patient will remain safe and free from falls through end of shift      Problem: Fall/Injury  Goal: Not fall by end of shift  Outcome: Progressing  Goal: Be free from injury by end of the shift  Outcome: Progressing  Goal: Verbalize understanding of personal risk factors for fall in the hospital  Outcome: Progressing  Goal: Verbalize understanding of risk factor reduction measures to prevent injury from fall in the home  Outcome: Progressing  Goal: Use assistive devices by end of the shift  Outcome: Progressing  Goal: Pace activities to prevent fatigue by end of the shift  Outcome: Progressing     Problem: Pain  Goal: Takes deep breaths with improved pain control throughout the shift  Outcome: Progressing  Goal: Turns in bed with improved pain control throughout the shift  Outcome: Progressing  Goal: Walks with improved pain control throughout the shift  Outcome: Progressing  Goal: Performs ADL's with improved pain control throughout shift  Outcome: Progressing  Goal: Participates in PT with improved pain control throughout the shift  Outcome: Progressing  Goal: Free from opioid side effects throughout the shift  Outcome: Progressing  Goal: Free from acute confusion related to pain meds throughout the shift  Outcome: Progressing     Problem: Pain - Adult  Goal: Verbalizes/displays adequate comfort level or baseline comfort level  Outcome: Progressing     Problem: Safety - Adult  Goal: Free from fall injury  Outcome: Progressing     Problem: Discharge Planning  Goal: Discharge to home or other facility with appropriate resources  Outcome: Progressing     Problem: Chronic Conditions and Co-morbidities  Goal: Patient's chronic conditions and co-morbidity symptoms are monitored and maintained or improved  Outcome: Progressing

## 2024-09-07 NOTE — ANESTHESIA PREPROCEDURE EVALUATION
Patient: Gal Cramer    Procedure Information       Date/Time: 09/07/24 0745    Procedure: Debridement Knee (Right: Knee)    Location: [facility] OR 06 / [facility] OR    Surgeons: Pop Ya MD            Relevant Problems   Anesthesia (within normal limits)      Cardiac   (+) HTN (hypertension)      Pulmonary   (+) JAVED (obstructive sleep apnea)      Neuro   (+) Dementia (Multi)      GI   (+) Gastroesophageal reflux disease      /Renal   (+) BPH (benign prostatic hyperplasia)   (+) Chronic renal insufficiency      Liver (within normal limits)      Endocrine (within normal limits)      Hematology (within normal limits)      ID  UTIx2 weeks  Current knee infection        Clinical information reviewed:   Tobacco  Allergies    Med Hx  Surg Hx   Fam Hx  Soc Hx        NPO Detail:  No data recorded     Physical Exam    Airway  Mallampati: II  TM distance: >3 FB     Cardiovascular - normal exam     Dental - normal exam     Pulmonary - normal exam     Abdominal        Anesthesia Plan    History of general anesthesia?: yes  History of complications of general anesthesia?: no    ASA 3     general     The patient is not a current smoker.  Patient was not previously instructed to abstain from smoking on day of procedure.  Patient did not smoke on day of procedure.    intravenous induction   Postoperative administration of opioids is intended.  Anesthetic plan and risks discussed with patient.  Use of blood products discussed with patient who.    Plan discussed with attending.

## 2024-09-08 ENCOUNTER — APPOINTMENT (OUTPATIENT)
Dept: RADIOLOGY | Facility: HOSPITAL | Age: 84
DRG: 902 | End: 2024-09-08
Payer: MEDICARE

## 2024-09-08 LAB
ALBUMIN SERPL BCP-MCNC: 3.8 G/DL (ref 3.4–5)
ANION GAP SERPL CALC-SCNC: 12 MMOL/L (ref 10–20)
BASOPHILS # BLD AUTO: 0.04 X10*3/UL (ref 0–0.1)
BASOPHILS NFR BLD AUTO: 0.8 %
BUN SERPL-MCNC: 26 MG/DL (ref 6–23)
CALCIUM SERPL-MCNC: 9.2 MG/DL (ref 8.6–10.6)
CHLORIDE SERPL-SCNC: 105 MMOL/L (ref 98–107)
CO2 SERPL-SCNC: 27 MMOL/L (ref 21–32)
CREAT SERPL-MCNC: 1.86 MG/DL (ref 0.5–1.3)
EGFRCR SERPLBLD CKD-EPI 2021: 35 ML/MIN/1.73M*2
EOSINOPHIL # BLD AUTO: 0.22 X10*3/UL (ref 0–0.4)
EOSINOPHIL NFR BLD AUTO: 4.5 %
ERYTHROCYTE [DISTWIDTH] IN BLOOD BY AUTOMATED COUNT: 12.4 % (ref 11.5–14.5)
GLUCOSE SERPL-MCNC: 95 MG/DL (ref 74–99)
HCT VFR BLD AUTO: 29.1 % (ref 41–52)
HGB BLD-MCNC: 9.9 G/DL (ref 13.5–17.5)
IMM GRANULOCYTES # BLD AUTO: 0.03 X10*3/UL (ref 0–0.5)
IMM GRANULOCYTES NFR BLD AUTO: 0.6 % (ref 0–0.9)
LYMPHOCYTES # BLD AUTO: 1.11 X10*3/UL (ref 0.8–3)
LYMPHOCYTES NFR BLD AUTO: 22.5 %
MAGNESIUM SERPL-MCNC: 1.93 MG/DL (ref 1.6–2.4)
MCH RBC QN AUTO: 33.6 PG (ref 26–34)
MCHC RBC AUTO-ENTMCNC: 34 G/DL (ref 32–36)
MCV RBC AUTO: 99 FL (ref 80–100)
MONOCYTES # BLD AUTO: 0.41 X10*3/UL (ref 0.05–0.8)
MONOCYTES NFR BLD AUTO: 8.3 %
NEUTROPHILS # BLD AUTO: 3.13 X10*3/UL (ref 1.6–5.5)
NEUTROPHILS NFR BLD AUTO: 63.3 %
NRBC BLD-RTO: 0 /100 WBCS (ref 0–0)
PHOSPHATE SERPL-MCNC: 4 MG/DL (ref 2.5–4.9)
PLATELET # BLD AUTO: 205 X10*3/UL (ref 150–450)
POTASSIUM SERPL-SCNC: 4.4 MMOL/L (ref 3.5–5.3)
RBC # BLD AUTO: 2.95 X10*6/UL (ref 4.5–5.9)
SODIUM SERPL-SCNC: 140 MMOL/L (ref 136–145)
WBC # BLD AUTO: 4.9 X10*3/UL (ref 4.4–11.3)

## 2024-09-08 PROCEDURE — 85025 COMPLETE CBC W/AUTO DIFF WBC: CPT | Performed by: NUTRITIONIST

## 2024-09-08 PROCEDURE — 97530 THERAPEUTIC ACTIVITIES: CPT | Mod: GP

## 2024-09-08 PROCEDURE — 2500000001 HC RX 250 WO HCPCS SELF ADMINISTERED DRUGS (ALT 637 FOR MEDICARE OP)

## 2024-09-08 PROCEDURE — 2500000004 HC RX 250 GENERAL PHARMACY W/ HCPCS (ALT 636 FOR OP/ED): Mod: JZ

## 2024-09-08 PROCEDURE — 99221 1ST HOSP IP/OBS SF/LOW 40: CPT

## 2024-09-08 PROCEDURE — 83735 ASSAY OF MAGNESIUM: CPT

## 2024-09-08 PROCEDURE — 93971 EXTREMITY STUDY: CPT | Performed by: RADIOLOGY

## 2024-09-08 PROCEDURE — 97161 PT EVAL LOW COMPLEX 20 MIN: CPT | Mod: GP

## 2024-09-08 PROCEDURE — 2500000001 HC RX 250 WO HCPCS SELF ADMINISTERED DRUGS (ALT 637 FOR MEDICARE OP): Performed by: NUTRITIONIST

## 2024-09-08 PROCEDURE — 80069 RENAL FUNCTION PANEL: CPT | Performed by: NUTRITIONIST

## 2024-09-08 PROCEDURE — 93970 EXTREMITY STUDY: CPT

## 2024-09-08 PROCEDURE — 2500000004 HC RX 250 GENERAL PHARMACY W/ HCPCS (ALT 636 FOR OP/ED)

## 2024-09-08 PROCEDURE — 1100000001 HC PRIVATE ROOM DAILY

## 2024-09-08 PROCEDURE — 36415 COLL VENOUS BLD VENIPUNCTURE: CPT | Performed by: NUTRITIONIST

## 2024-09-08 RX ORDER — LIDOCAINE 40 MG/G
CREAM TOPICAL ONCE
Status: COMPLETED | OUTPATIENT
Start: 2024-09-08 | End: 2024-09-08

## 2024-09-08 ASSESSMENT — COGNITIVE AND FUNCTIONAL STATUS - GENERAL
CLIMB 3 TO 5 STEPS WITH RAILING: TOTAL
TURNING FROM BACK TO SIDE WHILE IN FLAT BAD: A LITTLE
MOVING TO AND FROM BED TO CHAIR: A LITTLE
MOBILITY SCORE: 16
MOVING TO AND FROM BED TO CHAIR: A LITTLE
STANDING UP FROM CHAIR USING ARMS: A LITTLE
WALKING IN HOSPITAL ROOM: A LITTLE
CLIMB 3 TO 5 STEPS WITH RAILING: A LITTLE
DAILY ACTIVITIY SCORE: 24
WALKING IN HOSPITAL ROOM: A LITTLE
MOVING TO AND FROM BED TO CHAIR: A LITTLE
DAILY ACTIVITIY SCORE: 24
CLIMB 3 TO 5 STEPS WITH RAILING: A LITTLE
STANDING UP FROM CHAIR USING ARMS: A LITTLE
STANDING UP FROM CHAIR USING ARMS: A LITTLE
WALKING IN HOSPITAL ROOM: A LITTLE
MOBILITY SCORE: 20
MOBILITY SCORE: 20
MOVING FROM LYING ON BACK TO SITTING ON SIDE OF FLAT BED WITH BEDRAILS: A LITTLE

## 2024-09-08 ASSESSMENT — PAIN SCALES - GENERAL
PAINLEVEL_OUTOF10: 0 - NO PAIN

## 2024-09-08 ASSESSMENT — PAIN - FUNCTIONAL ASSESSMENT: PAIN_FUNCTIONAL_ASSESSMENT: 0-10

## 2024-09-08 ASSESSMENT — ACTIVITIES OF DAILY LIVING (ADL): ADL_ASSISTANCE: INDEPENDENT

## 2024-09-08 NOTE — PROGRESS NOTES
Gal Cramer is a 84 y.o. male on day 2 of admission presenting with Pain in lateral portion of left knee.      Subjective   Pt doing well today on exam no n/v/c/d in no pain. Pt with documented dementia. Pending plastic surgery closure after I/D with ortho 9/8. Wound wrapped in aced bandage with suction. DVT ultrasound completed at bedside of the proximal veins showing no dvt with good compression unable to access distal lower extremity because of post op wound and surgey. Ortho dropped recs.     Objective     Last Recorded Vitals  /90 (BP Location: Left arm, Patient Position: Lying)   Pulse 72   Temp 36.8 °C (98.2 °F) (Temporal)   Resp 16   Wt 75.3 kg (166 lb)   SpO2 99%   Intake/Output last 3 Shifts:  No intake or output data in the 24 hours ending 09/08/24 1116    Admission Weight  Weight: 75.3 kg (166 lb) (09/06/24 1346)    Daily Weight  09/06/24 : 75.3 kg (166 lb)    Image Results  Lower extremity venous duplex bilateral  Narrative: STUDY:  [facility] US LOWER EXTREMITY VENOUS DUPLEX BILATERAL;  9/8/2024 10:00 am      INDICATION:  Signs/Symptoms:concern for RLE DVT.      ,I82.4Z9 Acute embolism and thrombosis of unspecified deep veins of  unspecified distal lower extremity (Multi),R60.1 Generalized edema      COMPARISON:  None.      ACCESSION NUMBER(S):  [id number]      ORDERING CLINICIAN:  EVER MÉNDEZ      TECHNIQUE:  Vascular ultrasound of the bilateral lower extremities was performed.  Real-time compression views as well as Gray scale, color Doppler and  spectral Doppler waveform analysis was performed.      FINDINGS:  Evaluation of the visualized portions of the bilateral common femoral  vein, proximal, mid, and distal femoral vein, and popliteal vein were  performed.  Evaluation of the visualized portions of the  posterior  tibial and peroneal veins were also performed.      Limitations:  None.      The evaluated veins demonstrate normal compressibility. There is  intact venous flow  demonstrating normal respiratory variability and  normal augmentation of flow with calf compression. Therefore, there  is no ultrasonographic evidence for deep vein thrombosis within the  evaluated veins.      Wound VAC extensive edema noted about the right leg.      Impression: No sonographic evidence for deep vein thrombosis within the evaluated  veins of the bilateral lower extremities.      I personally reviewed the images/study, and I agree with the findings  as stated above. This study was interpreted at [facility], [place], [place].      MACRO:  None          Dictation workstation:   [id number]      Physical Exam  Constitutional:       General: He is not in acute distress.     Appearance: Normal appearance.   HENT:      Head: Normocephalic and atraumatic.   Eyes:      Extraocular Movements: Extraocular movements intact.      Pupils: Pupils are equal, round, and reactive to light.   Cardiovascular:      Rate and Rhythm: Normal rate and regular rhythm.   Pulmonary:      Effort: Pulmonary effort is normal.      Breath sounds: Normal breath sounds.   Abdominal:      General: Abdomen is flat. Bowel sounds are normal.      Palpations: Abdomen is soft.      Tenderness: There is no abdominal tenderness.   Musculoskeletal:      Comments: RLE wrapped in ace bandage and with wound vac attached. On exam 9/7 calf swollen, tender, and warm, DVT ultrasound negative 9/8   Skin:     General: Skin is warm and dry.   Neurological:      Mental Status: He is alert.      Comments: Pt has dementia at baseline. Pt frustrated with orientation questions. A &O x2 (self, location, some what situation)         Relevant Results    Results for orders placed or performed during the hospital encounter of 09/06/24 (from the past 24 hour(s))   Magnesium   Result Value Ref Range    Magnesium 1.93 1.60 - 2.40 mg/dL   CBC and Auto Differential   Result Value Ref Range    WBC 4.9 4.4 - 11.3 x10*3/uL    nRBC 0.0  0.0 - 0.0 /100 WBCs    RBC 2.95 (L) 4.50 - 5.90 x10*6/uL    Hemoglobin 9.9 (L) 13.5 - 17.5 g/dL    Hematocrit 29.1 (L) 41.0 - 52.0 %    MCV 99 80 - 100 fL    MCH 33.6 26.0 - 34.0 pg    MCHC 34.0 32.0 - 36.0 g/dL    RDW 12.4 11.5 - 14.5 %    Platelets 205 150 - 450 x10*3/uL    Neutrophils % 63.3 40.0 - 80.0 %    Immature Granulocytes %, Automated 0.6 0.0 - 0.9 %    Lymphocytes % 22.5 13.0 - 44.0 %    Monocytes % 8.3 2.0 - 10.0 %    Eosinophils % 4.5 0.0 - 6.0 %    Basophils % 0.8 0.0 - 2.0 %    Neutrophils Absolute 3.13 1.60 - 5.50 x10*3/uL    Immature Granulocytes Absolute, Automated 0.03 0.00 - 0.50 x10*3/uL    Lymphocytes Absolute 1.11 0.80 - 3.00 x10*3/uL    Monocytes Absolute 0.41 0.05 - 0.80 x10*3/uL    Eosinophils Absolute 0.22 0.00 - 0.40 x10*3/uL    Basophils Absolute 0.04 0.00 - 0.10 x10*3/uL   Renal function panel   Result Value Ref Range    Glucose 95 74 - 99 mg/dL    Sodium 140 136 - 145 mmol/L    Potassium 4.4 3.5 - 5.3 mmol/L    Chloride 105 98 - 107 mmol/L    Bicarbonate 27 21 - 32 mmol/L    Anion Gap 12 10 - 20 mmol/L    Urea Nitrogen 26 (H) 6 - 23 mg/dL    Creatinine 1.86 (H) 0.50 - 1.30 mg/dL    eGFR 35 (L) >60 mL/min/1.73m*2    Calcium 9.2 8.6 - 10.6 mg/dL    Phosphorus 4.0 2.5 - 4.9 mg/dL    Albumin 3.8 3.4 - 5.0 g/dL      A/P: 84 y.o. male with a hx of dementia, HTN, HLD, GERD, CKD 3, peripheral neuropathy, and BPH (self-cath 3-4x every day)  who presented to the ED today from outpatient orthopedic office, admitted to Medicine for elly-operative management. On initial assessment afebrile, HDS, with O2 sats appropriate on RA. Overall concerning for re accumulation of right knee hematoma. SIRS negative at this time, will hold off on Abx. Blood cultures collected, follow up. If positive plan to start IV Abx. Successful I&D 9/7 with collected intra-operative Cx. DVT ultrasound not concerning for DVT.  Pending plastic surgery recs for wound closure.     Updates:  -PT rec mod intensity  level  -Successful I&D with ortho 9/7, intra-op cx pending, blood cx collected 9/7 ngtd  -Ortho Recs: Weightbearing RLE as tolerated, pain: per primary, recommend multimodal (Tylenol, oxycodone, dilaudid), Perioperative ABx: Ancef x 24 hr completed 9/8, Drain: [place] set to suction   -DVT ppx continuing ASA 81 mg EC BID x 4 weeks per ortho, DVT ultrasound not concerning for DVT 9/8  - PT/OT following  - Plastic Surgery recs: Plastic Surgery will Evaluate to decide on definitve vac management vs local advancement flap for tissue coverage  -Holding lisinopril until after plastic surgery, if applicable  - Blood Cultures NGTD 9/7/24  -Nursing communication to ensure walker in room 9/8    #R Knee hematoma with overlying ulcer  :: Knee XR with no acute fracture. Noted to have significant soft tissue swelling over anteromedial aspect of the knee   :: ESR 23, CRP 0.18   :: Patient afebrile and without a leukocytosis   -pt ambulates with walker and cane at home  -Usually follows with Dr. Schmitz outpt ortho  -Type and screen ordered (09/06), coags with AM labs 9/7  Plan:  -Continue Tylenol 650 mg q6H PRN, oxycodone 5 mg q6H PRN for moderate pain, Oxycodone 10 mg q6H PRN for severe pain   -DVT ppx continuing ASA 81 mg EC BID x 4 weeks per ortho, DVT ultrasound not concerning for DVT 9/8  -PT rec mod intensity level  -Successful I&D with ortho 9/7, intra-op cx pending, blood cx collected 9/7 ngtd  -Ortho Recs: Weightbearing RLE as tolerated, Perioperative ABx: Ancef x 24 hr completed 9/8, Drain: [place] set to suction   - PT/OT following  - Plastic Surgery recs: Plastic Surgery will evaluate to decide on definitve vac management vs local advancement flap for tissue coverage  -Holding lisinopril until after plastic surgery, if applicable  -Nursing communication to ensure walker in room 9/8        #concern for RLE DVT   :: Has been on aspirin 81 mg BID for DVT Ppx, per  reports that has been given at facility   :: Exam with RLE  erythema, warmth, and tenderness to palpation->High clinical suspicion for DVT of RLE   Plan:  DVT ultrasound not concerning for proximal DVT 9/8  -Ortho Recs: Weightbearing RLE as tolerated  -pain as above  -DVT ppx continuing ASA 81 mg EC BID x 4 weeks per ortho   - PT/OT following     #VEDA on CKD 3  -Cr 1.87 which seems close to baseline, lowest on record 1.63  -Pt on straight cath schedule at home done by , pt having   Plan:  -Encourage po intake  - agreeable to placing clark 9/8 to reduce issues with delayed straight caths, pt forgets to cath himself as his  does it for him multiple times a day at home   -PVR 1ml after straight cath  -Can consider starting flomax    #HTN  #HLD  Plan:  - Hold home lisinopril elly-operatively until plastics surgical intervention. Can consider coreg or hydral for elevated pressures if needed  - continue with home Pravastatin     #GERD  Plan:  - continue with home PPI      #BPH  Plan:  - continue with home finasteride      #MDD  #Dementia   -9/8 baseline a&ox2 (self, location, and some of situation)  Plan:  - continue with home donepezil, Memantine   - per patient not on lexapro, do not restart unless clinically appropriate       F: PRN for euvolemia   E: PRN K>4, Mg>2, P>3   N: reg diet  A: PIV   DVT ppx: SCDs and ASA  GI ppx: home ppi     Code Status: DNR/DNI of for ICU (confirmed on Admission)  Surrogate Medical Decision-maker:   Navi Cavazos [phone]       Tiffany Gonzalez MD

## 2024-09-08 NOTE — CARE PLAN
Problem: Fall/Injury  Goal: Not fall by end of shift  Outcome: Progressing  Goal: Be free from injury by end of the shift  Outcome: Progressing  Goal: Verbalize understanding of personal risk factors for fall in the hospital  Outcome: Progressing  Goal: Verbalize understanding of risk factor reduction measures to prevent injury from fall in the home  Outcome: Progressing  Goal: Use assistive devices by end of the shift  Outcome: Progressing  Goal: Pace activities to prevent fatigue by end of the shift  Outcome: Progressing     Problem: Pain  Goal: Takes deep breaths with improved pain control throughout the shift  Outcome: Progressing  Goal: Turns in bed with improved pain control throughout the shift  Outcome: Progressing  Goal: Walks with improved pain control throughout the shift  Outcome: Progressing  Goal: Performs ADL's with improved pain control throughout shift  Outcome: Progressing  Goal: Participates in PT with improved pain control throughout the shift  Outcome: Progressing  Goal: Free from opioid side effects throughout the shift  Outcome: Progressing  Goal: Free from acute confusion related to pain meds throughout the shift  Outcome: Progressing     Problem: Pain - Adult  Goal: Verbalizes/displays adequate comfort level or baseline comfort level  Outcome: Progressing     Problem: Safety - Adult  Goal: Free from fall injury  Outcome: Progressing     Problem: Discharge Planning  Goal: Discharge to home or other facility with appropriate resources  Outcome: Progressing     Problem: Chronic Conditions and Co-morbidities  Goal: Patient's chronic conditions and co-morbidity symptoms are monitored and maintained or improved  Outcome: Progressing

## 2024-09-08 NOTE — CONSULTS
Plastic Surgery Consult    Patient Name: Gal Cramer  MRN: [id number]  Date:  09/08/24     History of Present Illness  Gal Cramer is a 84 y.o. male with a past medical history of dementia, HTN, HLD, GERD, peripheral neuropathy, and BPH (self-cath 3-4x every day)  who presented to the ED 9/6 from the outpatient orthopedic office given c/f recurrent right knee hematoma. Patient was admitted to [facility] 08/17-08/21 following unprovoked mechanical fall down 1 step resulting in R Knee hematoma which rapidly increased in size with a hemorrhagic bullae on the anteromedial portion of the R knee, causing lateralization of the patella. Patient evaluated by orthopedic surgery for hematoma management, and taken for I&D of hematoma on 8/19. Hemovac drain removed on 8/21 and patient discharged in stable condition to SNF. Saw orthopedics 9/6 for post-op check which was c/f hematoma recurrence with concerning overlying skin viability and patient recommended to present to ED for admission for repeat I&D. Patient admitted to Medicine for elly-operative management. He returned to the OR 9/7/24 with orthopedic surgery for debridement of knee wound and evacuation of hematoma, with necrotic skin excised, and application of wound vac. Plastic surgery consulted for wound assessment and possible assistance with eventual wound coverage/closure.     I spoke with the patient and his  today. The patient is feeling well. Denies pain, was ambulatory prior to hospitalization. Patient denies any current fevers, chills, chest pain, shortness of breath.     He is healthy, but due to dementia he has help with day to day activities, including medication administration and healthcare utilization. Patient does self cath for BPH. Patient's spouse was concerned about the prior experience with wound care and the recurrent hematoma/skin necrosis that he feels could have been prevented.         Past Medical History:   Diagnosis Date     Dementia (Multi)     GERD (gastroesophageal reflux disease)     HL (hearing loss)     Hyperlipidemia     Hypertension     Neurogenic bladder     Vision loss      History reviewed. No pertinent surgical history.  No Known Allergies    Current Facility-Administered Medications:     acetaminophen (Tylenol) tablet 650 mg, 650 mg, oral, q6h PRN, Dayron Mesa MD    aspirin EC tablet 81 mg, 81 mg, oral, BID, Keith Turk MD, 81 mg at 09/08/24 0824    calcium carbonate-vitamin D3 500 mg-5 mcg (200 unit) per tablet 2 tablet, 2 tablet, oral, Daily, Dayron Mesa MD, 2 tablet at 09/08/24 0824    donepezil (Aricept) tablet 10 mg, 10 mg, oral, Daily, Dayron Mesa MD, 10 mg at 09/08/24 0824    finasteride (Proscar) tablet 5 mg, 5 mg, oral, Daily, Dayron Mesa MD, 5 mg at 09/08/24 0824    [Held by provider] lisinopril tablet 20 mg, 20 mg, oral, Daily, Dayron Mesa MD    memantine (Namenda) tablet 10 mg, 10 mg, oral, Daily, Dayron Mesa MD, 10 mg at 09/08/24 0824    mirtazapine (Remeron) tablet 15 mg, 15 mg, oral, Nightly, Dayron Mesa MD, 15 mg at 09/07/24 2024    oxyCODONE (Roxicodone) immediate release tablet 10 mg, 10 mg, oral, q6h PRN, Tiffany Gonzalez MD    oxyCODONE (Roxicodone) immediate release tablet 5 mg, 5 mg, oral, q6h PRN, Tiffany Gonzalez MD    pantoprazole (ProtoNix) EC tablet 40 mg, 40 mg, oral, Daily before breakfast, Dayron Mesa MD, 40 mg at 09/08/24 0626    polyethylene glycol (Glycolax, Miralax) packet 17 g, 17 g, oral, Daily, Dayron Mesa MD, 17 g at 09/08/24 0824    [START ON 9/9/2024] pravastatin (Pravachol) tablet 10 mg, 10 mg, oral, Every Mon/Wed/Fri, Dayron Mesa MD   No family history on file.  Social History     Tobacco Use    Smoking status: Never     Passive exposure: Never    Smokeless tobacco: Never   Vaping Use    Vaping status: Never Used   Substance Use Topics    Alcohol use: Yes     Comment: OCCASIONALLY    Drug use: Never     Review of Systems   Complete and  "Negative Except as Noted in HPI    Objective    /79 (BP Location: Left arm, Patient Position: Lying)   Pulse 81   Temp 36.6 °C (97.9 °F) (Temporal)   Resp 16   Ht 1.753 m (5' 9\")   Wt 75.3 kg (166 lb)   SpO2 99%   BMI 24.51 kg/m²      Physical Exam  Constitutional:       General: He is not in acute distress.     Appearance: He is not toxic-appearing or diaphoretic.   HENT:      Head: Normocephalic and atraumatic.      Nose: No congestion.      Mouth/Throat:      Mouth: Mucous membranes are moist.      Pharynx: No oropharyngeal exudate.   Eyes:      General: No scleral icterus.     Pupils: Pupils are equal, round, and reactive to light.   Cardiovascular:      Rate and Rhythm: Normal rate and regular rhythm.      Pulses: Normal pulses.      Comments: 2 DP pulses bilaterally  Pulmonary:      Comments: Room Air, no dyspnea  Abdominal:      Comments: Soft, NT, Non distended    Musculoskeletal:      Cervical back: Neck supple. No rigidity.      Comments: RLE with wound vac over knee. Minimal surrounding ecchymosis. No crepitus or spreading erythema. ACE wrap over wound vac with trace edema. Good distal pulses and sensation to right foot.    Skin:     Comments: Seek MSK   Neurological:      Mental Status: He is alert.      Comments: Alert and oriented to self, place, year.           Diagnostics   Results for orders placed or performed during the hospital encounter of 09/06/24 (from the past 24 hour(s))   Magnesium   Result Value Ref Range    Magnesium 1.93 1.60 - 2.40 mg/dL   CBC and Auto Differential   Result Value Ref Range    WBC 4.9 4.4 - 11.3 x10*3/uL    nRBC 0.0 0.0 - 0.0 /100 WBCs    RBC 2.95 (L) 4.50 - 5.90 x10*6/uL    Hemoglobin 9.9 (L) 13.5 - 17.5 g/dL    Hematocrit 29.1 (L) 41.0 - 52.0 %    MCV 99 80 - 100 fL    MCH 33.6 26.0 - 34.0 pg    MCHC 34.0 32.0 - 36.0 g/dL    RDW 12.4 11.5 - 14.5 %    Platelets 205 150 - 450 x10*3/uL    Neutrophils % 63.3 40.0 - 80.0 %    Immature Granulocytes %, Automated " 0.6 0.0 - 0.9 %    Lymphocytes % 22.5 13.0 - 44.0 %    Monocytes % 8.3 2.0 - 10.0 %    Eosinophils % 4.5 0.0 - 6.0 %    Basophils % 0.8 0.0 - 2.0 %    Neutrophils Absolute 3.13 1.60 - 5.50 x10*3/uL    Immature Granulocytes Absolute, Automated 0.03 0.00 - 0.50 x10*3/uL    Lymphocytes Absolute 1.11 0.80 - 3.00 x10*3/uL    Monocytes Absolute 0.41 0.05 - 0.80 x10*3/uL    Eosinophils Absolute 0.22 0.00 - 0.40 x10*3/uL    Basophils Absolute 0.04 0.00 - 0.10 x10*3/uL   Renal function panel   Result Value Ref Range    Glucose 95 74 - 99 mg/dL    Sodium 140 136 - 145 mmol/L    Potassium 4.4 3.5 - 5.3 mmol/L    Chloride 105 98 - 107 mmol/L    Bicarbonate 27 21 - 32 mmol/L    Anion Gap 12 10 - 20 mmol/L    Urea Nitrogen 26 (H) 6 - 23 mg/dL    Creatinine 1.86 (H) 0.50 - 1.30 mg/dL    eGFR 35 (L) >60 mL/min/1.73m*2    Calcium 9.2 8.6 - 10.6 mg/dL    Phosphorus 4.0 2.5 - 4.9 mg/dL    Albumin 3.8 3.4 - 5.0 g/dL     Lower extremity venous duplex bilateral    Result Date: 9/8/2024  Interpreted By:  Kenny Reid and Meyers Emily STUDY: [facility] US LOWER EXTREMITY VENOUS DUPLEX BILATERAL;  9/8/2024 10:00 am   INDICATION: Signs/Symptoms:concern for RLE DVT.   ,I82.4Z9 Acute embolism and thrombosis of unspecified deep veins of unspecified distal lower extremity (Multi),R60.1 Generalized edema   COMPARISON: None.   ACCESSION NUMBER(S): [id number]   ORDERING CLINICIAN: EVER MÉNDEZ   TECHNIQUE: Vascular ultrasound of the bilateral lower extremities was performed. Real-time compression views as well as Gray scale, color Doppler and spectral Doppler waveform analysis was performed.   FINDINGS: Evaluation of the visualized portions of the bilateral common femoral vein, proximal, mid, and distal femoral vein, and popliteal vein were performed.  Evaluation of the visualized portions of the  posterior tibial and peroneal veins were also performed.   Limitations:  None.   The evaluated veins demonstrate normal compressibility.  There is intact venous flow demonstrating normal respiratory variability and normal augmentation of flow with calf compression. Therefore, there is no ultrasonographic evidence for deep vein thrombosis within the evaluated veins.   Wound VAC extensive edema noted about the right leg.       No sonographic evidence for deep vein thrombosis within the evaluated veins of the bilateral lower extremities.   I personally reviewed the images/study, and I agree with the findings as stated above. This study was interpreted at [place], [place].   MACRO: None   Signed by: Kenny Roque 9/8/2024 12:03 PM Dictation workstation:   [id number]    XR knee right 4+ views    Result Date: 9/6/2024  Interpreted By:  Rashaad Tinsley and Beyersdorf Conner STUDY: XR KNEE RIGHT 4+ VIEWS; ;  9/6/2024 10:30 pm   INDICATION: Signs/Symptoms:Pain, increased swelling.     COMPARISON: Knee x-ray 08/17/2024   ACCESSION NUMBER(S): [id number]   ORDERING CLINICIAN: EVER MÉNDEZ   FINDINGS: Four views of the right knee are provided.   No acute fracture or malalignment. There is significant soft tissue edema over the anteromedial aspect of the knee. There is no significant knee joint effusion. No radiopaque retained foreign body. Vascular calcifications noted.       1. No acute fracture. Significant soft tissue swelling over the anteromedial aspect of the knee.     I personally reviewed the image(s)/study and resident interpretation. I agree with the findings as stated by resident Denny Castellanos. Data analyzed and images interpreted at [place], [place].   MACRO: None   Signed by: Rashaad Tinsley 9/6/2024 10:50 PM Dictation workstation:   [id number]    XR chest 1 view    Result Date: 9/6/2024  Interpreted By:  Rashaad Tinsley and Beyersdorf Conner STUDY: XR CHEST 1 VIEW;  9/6/2024 10:30 pm   INDICATION: Signs/Symptoms:pre op eval.   COMPARISON:  Single-view chest 08/18/2024   ACCESSION NUMBER(S): [id number]   ORDERING CLINICIAN: LINDSAY URBINA   FINDINGS: AP radiograph of the chest was provided.     CARDIOMEDIASTINAL SILHOUETTE: Cardiomediastinal silhouette is normal in size and configuration.   LUNGS: No focal consolidation, pleural effusion, or pneumothorax. Stable mild interstitial prominence, likely chronic.   ABDOMEN: No remarkable upper abdominal findings.   BONES: Degenerative changes of the spine and AC joints.       1.  No acute cardiopulmonary process.   I personally reviewed the image(s)/study and resident interpretation. I agree with the findings as stated by resident Denny Castellanos. Data analyzed and images interpreted at [facility], [place], [place].   MACRO: None   Signed by: Rashaad Tinsley 9/6/2024 10:47 PM Dictation workstation:   [id number]    XR chest 1 view    Result Date: 8/18/2024  Interpreted By:  Andrew Cope, STUDY: XR CHEST 1 VIEW;  8/18/2024 5:57 pm   INDICATION: Signs/Symptoms:preop eval.   COMPARISON: None.   ACCESSION NUMBER(S): [id number]   ORDERING CLINICIAN: YAEL VALLADARES   FINDINGS: AP radiograph of the chest was provided.       CARDIOMEDIASTINAL SILHOUETTE: Cardiomediastinal silhouette is normal in size and configuration.   LUNGS: Lungs are clear.   ABDOMEN: No remarkable upper abdominal findings.   BONES: No acute osseous changes.       1.  No evidence of acute cardiopulmonary process.       MACRO: None   Signed by: Andrew Cope 8/18/2024 7:20 PM Dictation workstation:   [id number]    XR lumbar spine 2-3 views    Result Date: 8/18/2024  Interpreted By:  Araceli Wisdom and Kamau Nyokabi STUDY: XR LUMBAR SPINE 2-3 VIEWS; ;  8/18/2024 10:22 am   INDICATION: Signs/Symptoms:left low back pain after fall.   COMPARISON: Abdominal radiograph 03/13/2012 MRI of the lumbar spine 12/28/2013   ACCESSION NUMBER(S): [id number]   ORDERING CLINICIAN: SAVANNAH CARLIN   FINDINGS: Three views  of the lumbar spine.   Mild dextrocurvature of the lumbar spine.   No traumatic subluxation. No acute fractures.   Mild superior endplate concavity of L2 likely representing chronic compression fracture. Rest of the vertebral body heights are maintained. Severe degenerative changes from L3-4 to L5-S1.       Likely chronic mild L2 vertebral body compression fracture.   Severe degenerative changes from L3-4 to L5-S1.   Mild dextroscoliosis.   I personally reviewed the images/study and I agree with Dr. John Paul Dorman findings as stated. This study was interpreted at [place], [place]   MACRO: None   Signed by: Araceli Wisdom 8/18/2024 1:43 PM Dictation workstation:   [id number]    ECG 12 lead    Result Date: 8/17/2024  Normal sinus rhythm Right bundle branch block Abnormal ECG When compared with ECG of 13-MAR-2012 08:37, Right bundle branch block is now Present See ED provider note for full interpretation and clinical correlation Confirmed by David Mamrolejo ([id number]) on 8/17/2024 10:52:07 PM    ECG 12 lead    Result Date: 8/17/2024  Normal sinus rhythm Nonspecific intraventricular block Abnormal ECG When compared with ECG of 17-AUG-2024 17:31, Nonspecific intraventricular block has replaced Right bundle branch block See ED provider note for full interpretation and clinical correlation Confirmed by David Marmolejo ([id number]) on 8/17/2024 10:52:02 PM    CT knee right wo IV contrast    Result Date: 8/17/2024  STUDY: CT Right Knee; Completed Time:  8/17/2024 at 7:09 PM INDICATION: Right knee pain and swelling; minimal XR findings. COMPARISON: XR right knee, XR right femur, XR right hip and XR right tibia and fibula 8/17/2024. ACCESSION NUMBER(S): [id number] ORDERING CLINICIAN: SAVANNAH CARLIN TECHNIQUE:  Thin section axial images were obtained through the right knee without intravenous contrast.  Orthogonal reconstructed images were obtained and reviewed.  Automated mA/kV  exposure control was utilized and patient examination was performed in strict accordance with principles of ALARA. FINDINGS: There is a 3.6 x 8.9 x 11.2 cm soft tissue mass anteriorly in the subcutaneous fat.  It is heterogeneous in attenuation and is consistent with a hematoma.    No acute osseous abnormalities. Soft tissue mass in the anterior subcutaneous fat consistent with a hematoma. Signed by Jose Alejandro Cross MD    CT head wo IV contrast    Result Date: 8/17/2024  Interpreted By:  Miriam Paz, STUDY: CT HEAD WO IV CONTRAST; CT CERVICAL SPINE WO IV CONTRAST; CT FACIAL BONES WO IV CONTRAST;  8/17/2024 7:07 pm   INDICATION: Signs/Symptoms:fall struck head, no AC; Signs/Symptoms:fall; Signs/Symptoms:fall, right chibn pain.   COMPARISON: None.   ACCESSION NUMBER(S): [id number]; [id number]; [id number]   ORDERING CLINICIAN: SAVANNAH CARLIN   TECHNIQUE: Noncontrast axial CT scan of head was performed, with coronal and sagittal reformats provided. The images were reviewed in bone, brain, blood and soft tissue windows.   Thin cut axial CT images through the facial bones were obtained and reconstructed in the coronal and sagittal plane. 3D reconstruction of the facial bones was created on a dedicated workstation and provided for interpretation.   Axial CT images of the cervical spine are obtained. Axial, coronal and sagittal reconstructions are provided for review.   FINDINGS: CT HEAD:   No hyperdense intracranial hemorrhage is identified. There is no mass effect or midline shift.   Gray-white differentiation is intact, without evidence of CT apparent transcortical infarct, although patchy attenuation changes are present in the periventricular and subcortical white matter of bilateral cerebral hemispheres, nonspecific findings favored to represent sequela of microvascular disease.   No abnormal ventricular dilatation is present. Basal cisterns are patent. No extra-axial fluid collections are identified.    Scalp soft tissues do not demonstrate any acute abnormality. Calvarium is unremarkable without evidence of skull fracture.   Mastoid air cells and middle ear cavities are clear.   CT FACIAL BONES:   Bony orbits are intact, without evidence of fracture. Periorbital soft tissues and intraorbital structures are symmetric in appearance without evidence of acute trauma. Patient is status post cataract extraction bilaterally.   No acute facial bone fracture is identified. Nasal bones are unremarkable in appearance.   Paranasal sinuses are well aerated without evidence of air-fluid levels.   Some soft tissue swelling overlies the chin, without evidence of underlying osseous injury. No associated fluid collection or soft tissue gas is present.   Although evaluation of the oral cavity is degraded by beam hardening artifact no definite evidence of acute trauma to the oral cavity is present. Temporomandibular joints are intact, with asymmetric degenerative changes present in the right.   Large periapical lucency surrounds the roots of the 2nd right maxillary molar.   Parotid and submandibular glands are symmetric in appearance and otherwise unremarkable.   CT C-SPINE:   There is straightening of normal lordotic curvature of the cervical spine, without evidence of significant spondylolisthesis.   Cervical vertebral body heights are preserved without evidence of compression fractures, although some insufficiency endplate changes with smaller Schmorl's nodes are present at several levels. There is no evidence of acute trauma to the posterior elements of the cervical spine.   Craniocervical junction is intact, although degenerative changes at the atlantoaxial articulation with hypertrophic pannus extending into the anterior epidural space of C1 contributing to mild spinal canal narrowing at this level with the effacement of the anterior subarachnoid space.   Facet joints are preserved without evidence of traumatic subluxation or  perching, although multilevel degenerate facet osteoarthropathy is evident, most pronounced at C2-C3 and C3-C4 on the left.   Multilevel intervertebral disc height loss is present, moderate to severe at C4-C5 and C5-C6 and mild at other levels.   No high-grade stenosis is identified in the cervical spine, although mild spinal canal narrowing is suspected at C3-C4, C4-C5 and C5-C6 due to disc osteophyte complexes and ligamentum flavum thickening.   Mild spinal canal narrowing is present at C3-C4 bilaterally and C4-C5 on the left due to uncovertebral and facet joint hypertrophy.   Prevertebral and paraspinal soft tissues do not demonstrate any acute abnormalities. Included lung apices are clear.       CT HEAD: 1. No evidence of hemorrhage, skull fracture, or other acute intracranial trauma/abnormality. 2. Patchy attenuation changes are present in the periventricular and subcortical white matter of bilateral cerebral hemispheres, nonspecific findings favored to represent sequela of microvascular disease.   CT FACIAL BONES: 1. Soft tissue swelling overlies the chin, likely posttraumatic without absence of the underlying osseous injury. No facial or orbital bone fracture is present. 2. Large periapical lucency surrounds the roots of the 2nd right mandibular molar, correlate with dental exam.   CT C-SPINE: 1. No evidence of acute trauma to the cervical spine. 2. Multilevel degenerative changes of the cervical spine are present, with intervertebral disc height loss, facet osteoarthropathy, and mild spinal canal and neural foraminal stenosis at several levels due to disc osteophyte complexes, ligamentum flavum thickening and hypertrophic facet changes.   MACRO: None   Signed by: Miriam Paz 8/17/2024 7:28 PM Dictation workstation:   [id number]    CT maxillofacial bones wo IV contrast    Result Date: 8/17/2024  Interpreted By:  Miriam Paz, STUDY: CT HEAD WO IV CONTRAST; CT CERVICAL SPINE WO IV CONTRAST;  CT FACIAL BONES WO IV CONTRAST;  8/17/2024 7:07 pm   INDICATION: Signs/Symptoms:fall struck head, no AC; Signs/Symptoms:fall; Signs/Symptoms:fall, right chibn pain.   COMPARISON: None.   ACCESSION NUMBER(S): [id number]; [id number]; [id number]   ORDERING CLINICIAN: SAVANNAH CARLIN   TECHNIQUE: Noncontrast axial CT scan of head was performed, with coronal and sagittal reformats provided. The images were reviewed in bone, brain, blood and soft tissue windows.   Thin cut axial CT images through the facial bones were obtained and reconstructed in the coronal and sagittal plane. 3D reconstruction of the facial bones was created on a dedicated workstation and provided for interpretation.   Axial CT images of the cervical spine are obtained. Axial, coronal and sagittal reconstructions are provided for review.   FINDINGS: CT HEAD:   No hyperdense intracranial hemorrhage is identified. There is no mass effect or midline shift.   Gray-white differentiation is intact, without evidence of CT apparent transcortical infarct, although patchy attenuation changes are present in the periventricular and subcortical white matter of bilateral cerebral hemispheres, nonspecific findings favored to represent sequela of microvascular disease.   No abnormal ventricular dilatation is present. Basal cisterns are patent. No extra-axial fluid collections are identified.   Scalp soft tissues do not demonstrate any acute abnormality. Calvarium is unremarkable without evidence of skull fracture.   Mastoid air cells and middle ear cavities are clear.   CT FACIAL BONES:   Bony orbits are intact, without evidence of fracture. Periorbital soft tissues and intraorbital structures are symmetric in appearance without evidence of acute trauma. Patient is status post cataract extraction bilaterally.   No acute facial bone fracture is identified. Nasal bones are unremarkable in appearance.   Paranasal sinuses are well aerated without evidence of  air-fluid levels.   Some soft tissue swelling overlies the chin, without evidence of underlying osseous injury. No associated fluid collection or soft tissue gas is present.   Although evaluation of the oral cavity is degraded by beam hardening artifact no definite evidence of acute trauma to the oral cavity is present. Temporomandibular joints are intact, with asymmetric degenerative changes present in the right.   Large periapical lucency surrounds the roots of the 2nd right maxillary molar.   Parotid and submandibular glands are symmetric in appearance and otherwise unremarkable.   CT C-SPINE:   There is straightening of normal lordotic curvature of the cervical spine, without evidence of significant spondylolisthesis.   Cervical vertebral body heights are preserved without evidence of compression fractures, although some insufficiency endplate changes with smaller Schmorl's nodes are present at several levels. There is no evidence of acute trauma to the posterior elements of the cervical spine.   Craniocervical junction is intact, although degenerative changes at the atlantoaxial articulation with hypertrophic pannus extending into the anterior epidural space of C1 contributing to mild spinal canal narrowing at this level with the effacement of the anterior subarachnoid space.   Facet joints are preserved without evidence of traumatic subluxation or perching, although multilevel degenerate facet osteoarthropathy is evident, most pronounced at C2-C3 and C3-C4 on the left.   Multilevel intervertebral disc height loss is present, moderate to severe at C4-C5 and C5-C6 and mild at other levels.   No high-grade stenosis is identified in the cervical spine, although mild spinal canal narrowing is suspected at C3-C4, C4-C5 and C5-C6 due to disc osteophyte complexes and ligamentum flavum thickening.   Mild spinal canal narrowing is present at C3-C4 bilaterally and C4-C5 on the left due to uncovertebral and facet joint  hypertrophy.   Prevertebral and paraspinal soft tissues do not demonstrate any acute abnormalities. Included lung apices are clear.       CT HEAD: 1. No evidence of hemorrhage, skull fracture, or other acute intracranial trauma/abnormality. 2. Patchy attenuation changes are present in the periventricular and subcortical white matter of bilateral cerebral hemispheres, nonspecific findings favored to represent sequela of microvascular disease.   CT FACIAL BONES: 1. Soft tissue swelling overlies the chin, likely posttraumatic without absence of the underlying osseous injury. No facial or orbital bone fracture is present. 2. Large periapical lucency surrounds the roots of the 2nd right mandibular molar, correlate with dental exam.   CT C-SPINE: 1. No evidence of acute trauma to the cervical spine. 2. Multilevel degenerative changes of the cervical spine are present, with intervertebral disc height loss, facet osteoarthropathy, and mild spinal canal and neural foraminal stenosis at several levels due to disc osteophyte complexes, ligamentum flavum thickening and hypertrophic facet changes.   MACRO: None   Signed by: Miriam Paz 8/17/2024 7:28 PM Dictation workstation:   [id number]    CT cervical spine wo IV contrast    Result Date: 8/17/2024  Interpreted By:  Miriam Paz, STUDY: CT HEAD WO IV CONTRAST; CT CERVICAL SPINE WO IV CONTRAST; CT FACIAL BONES WO IV CONTRAST;  8/17/2024 7:07 pm   INDICATION: Signs/Symptoms:fall struck head, no AC; Signs/Symptoms:fall; Signs/Symptoms:fall, right chibn pain.   COMPARISON: None.   ACCESSION NUMBER(S): [id number]; [id number]; [id number]   ORDERING CLINICIAN: SAVANNAH CARLIN   TECHNIQUE: Noncontrast axial CT scan of head was performed, with coronal and sagittal reformats provided. The images were reviewed in bone, brain, blood and soft tissue windows.   Thin cut axial CT images through the facial bones were obtained and reconstructed in the coronal and  sagittal plane. 3D reconstruction of the facial bones was created on a dedicated workstation and provided for interpretation.   Axial CT images of the cervical spine are obtained. Axial, coronal and sagittal reconstructions are provided for review.   FINDINGS: CT HEAD:   No hyperdense intracranial hemorrhage is identified. There is no mass effect or midline shift.   Gray-white differentiation is intact, without evidence of CT apparent transcortical infarct, although patchy attenuation changes are present in the periventricular and subcortical white matter of bilateral cerebral hemispheres, nonspecific findings favored to represent sequela of microvascular disease.   No abnormal ventricular dilatation is present. Basal cisterns are patent. No extra-axial fluid collections are identified.   Scalp soft tissues do not demonstrate any acute abnormality. Calvarium is unremarkable without evidence of skull fracture.   Mastoid air cells and middle ear cavities are clear.   CT FACIAL BONES:   Bony orbits are intact, without evidence of fracture. Periorbital soft tissues and intraorbital structures are symmetric in appearance without evidence of acute trauma. Patient is status post cataract extraction bilaterally.   No acute facial bone fracture is identified. Nasal bones are unremarkable in appearance.   Paranasal sinuses are well aerated without evidence of air-fluid levels.   Some soft tissue swelling overlies the chin, without evidence of underlying osseous injury. No associated fluid collection or soft tissue gas is present.   Although evaluation of the oral cavity is degraded by beam hardening artifact no definite evidence of acute trauma to the oral cavity is present. Temporomandibular joints are intact, with asymmetric degenerative changes present in the right.   Large periapical lucency surrounds the roots of the 2nd right maxillary molar.   Parotid and submandibular glands are symmetric in appearance and otherwise  unremarkable.   CT C-SPINE:   There is straightening of normal lordotic curvature of the cervical spine, without evidence of significant spondylolisthesis.   Cervical vertebral body heights are preserved without evidence of compression fractures, although some insufficiency endplate changes with smaller Schmorl's nodes are present at several levels. There is no evidence of acute trauma to the posterior elements of the cervical spine.   Craniocervical junction is intact, although degenerative changes at the atlantoaxial articulation with hypertrophic pannus extending into the anterior epidural space of C1 contributing to mild spinal canal narrowing at this level with the effacement of the anterior subarachnoid space.   Facet joints are preserved without evidence of traumatic subluxation or perching, although multilevel degenerate facet osteoarthropathy is evident, most pronounced at C2-C3 and C3-C4 on the left.   Multilevel intervertebral disc height loss is present, moderate to severe at C4-C5 and C5-C6 and mild at other levels.   No high-grade stenosis is identified in the cervical spine, although mild spinal canal narrowing is suspected at C3-C4, C4-C5 and C5-C6 due to disc osteophyte complexes and ligamentum flavum thickening.   Mild spinal canal narrowing is present at C3-C4 bilaterally and C4-C5 on the left due to uncovertebral and facet joint hypertrophy.   Prevertebral and paraspinal soft tissues do not demonstrate any acute abnormalities. Included lung apices are clear.       CT HEAD: 1. No evidence of hemorrhage, skull fracture, or other acute intracranial trauma/abnormality. 2. Patchy attenuation changes are present in the periventricular and subcortical white matter of bilateral cerebral hemispheres, nonspecific findings favored to represent sequela of microvascular disease.   CT FACIAL BONES: 1. Soft tissue swelling overlies the chin, likely posttraumatic without absence of the underlying osseous injury.  No facial or orbital bone fracture is present. 2. Large periapical lucency surrounds the roots of the 2nd right mandibular molar, correlate with dental exam.   CT C-SPINE: 1. No evidence of acute trauma to the cervical spine. 2. Multilevel degenerative changes of the cervical spine are present, with intervertebral disc height loss, facet osteoarthropathy, and mild spinal canal and neural foraminal stenosis at several levels due to disc osteophyte complexes, ligamentum flavum thickening and hypertrophic facet changes.   MACRO: None   Signed by: Miriam Paz 8/17/2024 7:28 PM Dictation workstation:   [id number]    XR hip right with pelvis when performed 2 or 3 views    Result Date: 8/17/2024  STUDY: Femur Radiographs, Pelvis and Right Hip Radiographs;  08/17/2024 6:18 PM INDICATION: Fall/trauma. COMPARISON: None available. ACCESSION NUMBER(S): [id number], [id number] ORDERING CLINICIAN: SAVANNAH CARLIN TECHNIQUE:  Two views (five images) of the right femur.  One view(s) of the pelvis.  Two view(s) of the right hip hip. FINDINGS:  Right Femur: There is no displaced fracture.  The alignment is anatomic.  There is marked soft tissue swelling anteriorly over the knee. PELVIS: The pelvic ring is intact.  There is no acute fracture.  There are degenerative changes in the included the lower lumbar spine.  The sacroiliac joints are patent.  There is mild narrowing of the left hip joint Right Hip: There is no displaced fracture.  The alignment is anatomic.  There is mild joint space narrowing with acetabular spurring.  Nonspecific soft tissue reticulations.    No acute bony abnormalities on x-ray examination of the pelvis, right femur and right hip. Marked anterior soft tissue swelling over the right knee. Signed by Vonda Sanchez DO    XR femur right 2+ views    Result Date: 8/17/2024  STUDY: Femur Radiographs, Pelvis and Right Hip Radiographs;  08/17/2024 6:18 PM INDICATION: Fall/trauma. COMPARISON: None  available. ACCESSION NUMBER(S): [id number], [id number] ORDERING CLINICIAN: SAVANNAH CARLIN TECHNIQUE:  Two views (five images) of the right femur.  One view(s) of the pelvis.  Two view(s) of the right hip hip. FINDINGS:  Right Femur: There is no displaced fracture.  The alignment is anatomic.  There is marked soft tissue swelling anteriorly over the knee. PELVIS: The pelvic ring is intact.  There is no acute fracture.  There are degenerative changes in the included the lower lumbar spine.  The sacroiliac joints are patent.  There is mild narrowing of the left hip joint Right Hip: There is no displaced fracture.  The alignment is anatomic.  There is mild joint space narrowing with acetabular spurring.  Nonspecific soft tissue reticulations.    No acute bony abnormalities on x-ray examination of the pelvis, right femur and right hip. Marked anterior soft tissue swelling over the right knee. Signed by Vonda Sanchez DO    XR tibia fibula right 2 views    Result Date: 8/17/2024  STUDY: Knee Radiographs; 08/17/2024 6:17 PM INDICATION: Fall with right knee and leg pain. COMPARISON: None. ACCESSION NUMBER(S): [id number], [id number] ORDERING CLINICIAN: SAVANNAH CARLIN TECHNIQUE:  Right Knee:  Three view(s) of the right knee. Right Tibia/Fibula:  Five view(s) of the right tibia and fibula. FINDINGS:  Right Knee:  There is no displaced fracture.  The alignment is anatomic.  No soft tissue abnormality is seen.  There is no joint effusion. There is prominent soft tissue swelling anterior to the patella. Right Tibia/Fibula:  There is no displaced fracture.  The alignment is anatomic.  No soft tissue abnormality is seen.    Prominent soft tissue swelling anterior to the patella. No fracture or dislocation. Signed by Andre Chanel MD    XR knee right 1-2 views    Result Date: 8/17/2024  STUDY: Knee Radiographs; 08/17/2024 6:17 PM INDICATION: Fall with right knee and leg pain. COMPARISON: None. ACCESSION NUMBER(S):  [id number], [id number] ORDERING CLINICIAN: SAVANNAH CARLIN TECHNIQUE:  Right Knee:  Three view(s) of the right knee. Right Tibia/Fibula:  Five view(s) of the right tibia and fibula. FINDINGS:  Right Knee:  There is no displaced fracture.  The alignment is anatomic.  No soft tissue abnormality is seen.  There is no joint effusion. There is prominent soft tissue swelling anterior to the patella. Right Tibia/Fibula:  There is no displaced fracture.  The alignment is anatomic.  No soft tissue abnormality is seen.    Prominent soft tissue swelling anterior to the patella. No fracture or dislocation. Signed by Andre Chanel MD      Current Medications  Scheduled medications  aspirin, 81 mg, oral, BID  calcium carbonate-vitamin D3, 2 tablet, oral, Daily  donepezil, 10 mg, oral, Daily  finasteride, 5 mg, oral, Daily  [Held by provider] lisinopril, 20 mg, oral, Daily  memantine, 10 mg, oral, Daily  mirtazapine, 15 mg, oral, Nightly  pantoprazole, 40 mg, oral, Daily before breakfast  polyethylene glycol, 17 g, oral, Daily  [START ON 9/9/2024] pravastatin, 10 mg, oral, Every Mon/Wed/Fri      Continuous medications     PRN medications  PRN medications: acetaminophen, oxyCODONE, oxyCODONE     Assessment  Gal Cramer is a 84 y.o. male with a past medical history of dementia, HTN, HLD, GERD, peripheral neuropathy, and BPH (self-cath 3-4x every day)  who presented to the ED 9/6 from the outpatient orthopedic office given c/f recurrent right knee hematoma. Patient was admitted to [facility] 08/17-08/21 following unprovoked mechanical fall down 1 step resulting in R knee hematoma and was taken for I&D of hematoma on 8/19 which has been c/b hematoma recurrence with overlying wound/tissue necrosis. RTOR with orthopedic surgery on 9/7 for debridement of skin and soft tissue of knee down to articular cartilage. Plastic surgery consulted for wound assessment and possible assistance with eventual wound coverage/closure.     Blood  cultures NGTD. DVT scan Lower Extremities negative. CKD with Cr 1.6-2 past few months. Slight anemia HGB 10. No Leukocytosis.     I spoke extensively with patient and his  about options for healing this wound. If there is exposed cartilage, denuded of perichondrium, then local advancement flaps may be necessary for tissue coverage. However if there is granulation tissue at base, or tissue that can support wound healing, we may be able to allow vac assisted healing by secondary intention using a vac.     Plan/Recommendations  - Plastic Surgery will Evaluate wound at next vac change, please call phone [phone]  - Decision at that time to decide on definitve vac management vs local advancement flap for tissue coverage  - Blood Cultures NGTD 9/7/24  - WB restrictions per orthopedic surgery recommendations  - Appreciate remaining supportive care per primary service  - Plastic Surgery will continue to follow              Patient and plan discussed with Dr. Jamil MD   Plastic and Reconstructive Surgery   [place]  Pager #[phone]  Team phones: [phone]     Attending Attestation: I saw and evaluated the patient.  I personally obtained the key and critical portions of the history and physical exam or was physically present for key and critical portions performed by the resident/fellow. I reviewed the resident/fellow's documentation and discussed the patient with the resident/fellow.  I agree with the resident/fellow's medical decision making as documented in the note.     Jsoe Harper M.D.

## 2024-09-08 NOTE — ED NOTES
Pharmacy Medication History Review    Gal Cramer is a 84 y.o. male admitted for Pain in lateral portion of left knee. Pharmacy reviewed the patient's yfysq-vi-bcwycmdew medications and allergies for accuracy.    Medications CHANGED:  Omeprazole to Pantoprazole 20mg daily  Aspirin 81mg BID to daily    The list below reflects the updated PTA list.   Prior to Admission Medications   Prescriptions Last Dose Informant   acetaminophen (Tylenol) 325 mg tablet  Other   Sig: Take 2 tablets (650 mg) by mouth every 6 hours if needed for mild pain (1 - 3).   aspirin 81 mg EC tablet  Other   Sig: Take 1 tablet (81 mg) by mouth 2 times a day.   Patient taking differently: Take 1 tablet (81 mg) by mouth once daily.   calcium carbonate-vitamin D3 500 mg-5 mcg (200 unit) tablet  Other   Sig: Take 2 tablets by mouth once daily.   donepezil (Aricept) 10 mg tablet  Spouse/Significant Other, Other   Sig: Take 1 tablet (10 mg) by mouth once daily.   escitalopram (Lexapro) 10 mg tablet  Other   Sig: Take 1 tablet (10 mg) by mouth once daily.   finasteride (Proscar) 5 mg tablet  Spouse/Significant Other, Other   Sig: Take 1 tablet (5 mg) by mouth once daily.   lisinopril 20 mg tablet  Spouse/Significant Other, Other   Sig: Take 1 tablet (20 mg) by mouth once daily.   memantine (Namenda) 10 mg tablet  Other   Sig: Take 1 tablet (10 mg) by mouth once daily.   mirtazapine (Remeron) 15 mg tablet  Other   Sig: Take 1 tablet (15 mg) by mouth once daily at bedtime.   omeprazole (PriLOSEC) 10 mg DR capsule  Spouse/Significant Other, Other   Sig: Take 1 capsule (10 mg) by mouth once daily. Do not crush or chew.   polyethylene glycol (Glycolax, Miralax) 17 gram packet  Other   Sig: Take 17 g by mouth once daily.   pravastatin (Pravachol) 10 mg tablet  Spouse/Significant Other, Other   Sig: Take 1 tablet (10 mg) by mouth once a day on Monday, Wednesday, and Friday.      Facility-Administered Medications: None        The list below reflects the  "updated allergy list. Please review each documented allergy for additional clarification and justification.  Allergies  Reviewed by Julianne Phelps RN on 9/7/2024   No Known Allergies         Patient was unable to be assessed for M2B at discharge.     Sources:   Sources:    Nursing Home Visit note from 8/23.    Additional Comments:  SNF did not answer phone        JAVI DAY  PGY-1 Pharmacy Resident  09/08/24     Secure Chat preferred   If no response call [phone] or [employer] \"Med Rec\"   "

## 2024-09-08 NOTE — PROGRESS NOTES
"Orthopaedic Surgery Progress Note    Subjective: Evaluated in immediate postoperative period. Pain well controlled considering recent surgery. Denies chest pain, shortness of breath, or fevers.    Objective:  /90 (BP Location: Left arm, Patient Position: Lying)   Pulse 72   Temp 36.8 °C (98.2 °F) (Temporal)   Resp 16   Ht 1.753 m (5' 9\")   Wt 75.3 kg (166 lb)   SpO2 99%   BMI 24.51 kg/m²     Gen: arousable, NAD, appropriately conversational  Cardiac: RRR to peripheral palpation  Resp: nonlabored on RA  GI: soft, nondistended    MSK:  RLE:    - Post-operative dressing in place without strikethrough bleeding, wound vac holding suction  -Motor intact in DF/PF/EHL/FHL  -SILT in saph/sural/SPN/DPN distributions  -Foot wwp, 2+ DP/PT pulse, brisk cap refill  -Compartments soft and compressible, no pain with passive dorsiflexion      Assessment/Plan: 84 y.o. male s/p R knee debridement and wound vac placement on 9/7 with Dr. Ya.      Plan:  - Weightbearing Status: WBAT RLE  - Precautions: none  - Imaging: none  - Pain: per primary, recommend multimodal (Tylenol, oxycodone, dilaudid)  - Perioperative ABx: Ancef x 24 hr  - DVT PPx: per primary, recommend ASA 81 mg EC BID x 4 weeks  - Drain: WV set to suction  - Booker: absent  - Recommend consult PT/OT  - Appreciate excellent remainder of care per primary, Medicine  - Plastic Surgery consulted for intra-op wound evaluation and definitive wound closure  - Orthopedics to follow peripherally, wound to be definitively managed by plastics    Dispo: per primary    Kyle Snyder MD  Orthopaedic Surgery PGY-2  Available by Epic Chat    "

## 2024-09-08 NOTE — PROGRESS NOTES
Physical Therapy    Physical Therapy Evaluation    Patient Name: Gal Cramer  MRN: [id number]  Today's Date: 9/8/2024   Time Calculation  Start Time: 1237  Stop Time: 1301  Time Calculation (min): 24 min    Assessment/Plan   PT Assessment  PT Assessment Results: Decreased strength, Decreased endurance, Impaired balance, Decreased mobility  Rehab Prognosis: Excellent  Barriers to Discharge: none  Evaluation/Treatment Tolerance: Patient tolerated treatment well  End of Session Communication: Bedside nurse  Assessment Comment: 84 y.o. M s/p knee I&D and wound vac placement currently demonstrating impaired strength and function. Patient will benefit from continued PT while in house and after discharge to restore function.  End of Session Patient Position: Up in chair, Alarm off, caregiver present  IP OR SWING BED PT PLAN  Inpatient or Swing Bed: Inpatient  PT Plan  Treatment/Interventions: Bed mobility, Transfer training, Gait training, Balance training, Strengthening, Endurance training, Therapeutic exercise, Therapeutic activity  PT Plan: Ongoing PT  PT Eval Only Reason: At baseline function  PT Frequency: 5 times per week  PT Discharge Recommendations: Moderate intensity level of continued care  Equipment Recommended upon Discharge: Wheeled walker  PT Recommended Transfer Status: Assist x1  PT - OK to Discharge: Yes      Subjective   General Visit Information:  General  Reason for Referral: knee hematoma  Past Medical History Relevant to Rehab: 84 y.o. male s/p R knee debridement and wound vac placement on 9/7. h/o fall 3-4 weeks ago with hematoma and SNF stay. Now readmitted for reoccurence  Missed Visit: No  Missed Visit Reason:  (PT attempted, RN/CTA present in room. Pt currently with high bp 170's/90's. Will reattempt later in day as appropriate.)  Family/Caregiver Present: Yes (Family present.)  Prior to Session Communication: Bedside nurse  Patient Position Received: Bed, 3 rail up, Alarm off, caregiver  present  Preferred Learning Style: auditory, verbal  General Comment: Pt resting in bed upon entry. Family present. Pt pleasant and receptive to mobilitiy.    Home Living:  Home Living  Type of Home: House  Lives With: Spouse  Home Adaptive Equipment: Walker rolling or standard  Home Access: No concerns  Home Living Comments: Pt though most recently admitted from SNF.    Prior Level of Function:  Prior Function Per Pt/Caregiver Report  Level of [place]: Independent with ADLs and functional transfers  ADL Assistance: Independent  Ambulatory Assistance: Independent  Prior Function Comments: Most recently ambulating with a wheeled walker and assist at SNF.  Precautions:  Precautions  LE Weight Bearing Status: Weight Bearing as Tolerated  Medical Precautions: Fall precautions    Objective   Pain:  Pain Assessment  Pain Assessment: 0-10  0-10 (Numeric) Pain Score: 0 - No pain  Cognition:  Cognition  Overall Cognitive Status: Within Functional Limits, Impaired at baseline  Orientation Level: Disoriented to time  Attention: Within Functional Limits  Safety/Judgement: Within Functional Limits  Insight: Within function limits  Impulsive: Within functional limits    General Assessments:     Activity Tolerance  Endurance: Tolerates 10 - 20 min exercise with multiple rests    Sensation  Light Touch: No apparent deficits    Strength  Strength Comments: Grossly  Strength  Strength Comments: Grossly    Perception  Inattention/Neglect: Appears intact    Coordination  Movements are Fluid and Coordinated: Yes    Postural Control  Postural Control: Within Functional Limits    Static Sitting Balance  Static Sitting-Balance Support: Bilateral upper extremity supported  Static Sitting-Level of Assistance: Close supervision    Static Standing Balance  Static Standing-Balance Support: Bilateral upper extremity supported (on a wheeled walker)  Static Standing-Level of Assistance: Contact guard  Functional Assessments:     Bed  Mobility  Bed Mobility: Yes  Bed Mobility 1  Bed Mobility 1: Supine to sitting, Sitting to supine  Level of Assistance 1: Contact guard    Transfers  Transfer: Yes  Transfer 1  Transfer From 1: Sit to, Stand to  Transfer to 1: Stand, Sit  Transfer Device 1: Walker  Transfer Level of Assistance 1: Contact guard    Ambulation/Gait Training  Ambulation/Gait Training Performed: Yes  Ambulation/Gait Training 1  Surface 1: Level tile  Device 1: Rolling walker  Assistance 1: Minimum assistance  Quality of Gait 1: Diminished heel strike, Decreased step length, Shuffling gait  Comments/Distance (ft) 1: 5ft fwd/retro x 2. 40ft x 1    Outcome Measures:  [facility] Basic Mobility  Turning from your back to your side while in a flat bed without using bedrails: A little  Moving from lying on your back to sitting on the side of a flat bed without using bedrails: A little  Moving to and from bed to chair (including a wheelchair): A little  Standing up from a chair using your arms (e.g. wheelchair or bedside chair): A little  To walk in hospital room: A little  Climbing 3-5 steps with railing: Total  Basic Mobility - Total Score: 16    Encounter Problems       Encounter Problems (Active)       Mobility       STG - Patient will ambulate >75ft, wheeled walker, min assist       Start:  09/08/24    Expected End:  09/15/24               PT Transfers       STG - Patient to transfer to and from sit to supine SBA       Start:  09/08/24    Expected End:  09/15/24            STG - Patient will transfer sit to and from stand SBA       Start:  09/08/24    Expected End:  09/15/24               Pain - Adult              Education Documentation  Precautions, taught by Lopez Altman, PT at 9/8/2024  1:40 PM.  Learner: Family, Patient  Readiness: Acceptance  Method: Explanation  Response: Verbalizes Understanding    Mobility Training, taught by Lopez Altman, PT at 9/8/2024  1:40 PM.  Learner: Family, Patient  Readiness: Acceptance  Method:  Explanation  Response: Verbalizes Understanding    Education Comments  No comments found.

## 2024-09-08 NOTE — PROGRESS NOTES
Physical Therapy                 Therapy Communication Note    Patient Name: Gal Cramer  MRN: [id number]  Today's Date: 9/8/2024     Discipline: Physical Therapy    Missed Visit Reason:  (PT attempted, RN/CTA present in room. Pt currently with high bp 170's/90's. Will reattempt later in day as appropriate.)    Attempt

## 2024-09-08 NOTE — CARE PLAN
The patient's goals for the shift include  rest    The clinical goals for the shift include Pt will remain free of falls this shift

## 2024-09-09 PROBLEM — R60.1 GENERALIZED EDEMA: Status: ACTIVE | Noted: 2024-09-09

## 2024-09-09 LAB
ALBUMIN SERPL BCP-MCNC: 3.7 G/DL (ref 3.4–5)
ANION GAP SERPL CALC-SCNC: 12 MMOL/L (ref 10–20)
BASOPHILS # BLD AUTO: 0.05 X10*3/UL (ref 0–0.1)
BASOPHILS NFR BLD AUTO: 1 %
BUN SERPL-MCNC: 26 MG/DL (ref 6–23)
CALCIUM SERPL-MCNC: 9.2 MG/DL (ref 8.6–10.6)
CHLORIDE SERPL-SCNC: 105 MMOL/L (ref 98–107)
CO2 SERPL-SCNC: 26 MMOL/L (ref 21–32)
CREAT SERPL-MCNC: 1.81 MG/DL (ref 0.5–1.3)
EGFRCR SERPLBLD CKD-EPI 2021: 36 ML/MIN/1.73M*2
EOSINOPHIL # BLD AUTO: 0.21 X10*3/UL (ref 0–0.4)
EOSINOPHIL NFR BLD AUTO: 4.3 %
ERYTHROCYTE [DISTWIDTH] IN BLOOD BY AUTOMATED COUNT: 12.4 % (ref 11.5–14.5)
GLUCOSE SERPL-MCNC: 88 MG/DL (ref 74–99)
HCT VFR BLD AUTO: 28.7 % (ref 41–52)
HGB BLD-MCNC: 9.8 G/DL (ref 13.5–17.5)
IMM GRANULOCYTES # BLD AUTO: 0.03 X10*3/UL (ref 0–0.5)
IMM GRANULOCYTES NFR BLD AUTO: 0.6 % (ref 0–0.9)
LYMPHOCYTES # BLD AUTO: 0.95 X10*3/UL (ref 0.8–3)
LYMPHOCYTES NFR BLD AUTO: 19.6 %
MAGNESIUM SERPL-MCNC: 1.99 MG/DL (ref 1.6–2.4)
MCH RBC QN AUTO: 33.4 PG (ref 26–34)
MCHC RBC AUTO-ENTMCNC: 34.1 G/DL (ref 32–36)
MCV RBC AUTO: 98 FL (ref 80–100)
MONOCYTES # BLD AUTO: 0.43 X10*3/UL (ref 0.05–0.8)
MONOCYTES NFR BLD AUTO: 8.9 %
NEUTROPHILS # BLD AUTO: 3.17 X10*3/UL (ref 1.6–5.5)
NEUTROPHILS NFR BLD AUTO: 65.6 %
NRBC BLD-RTO: 0 /100 WBCS (ref 0–0)
PHOSPHATE SERPL-MCNC: 3.9 MG/DL (ref 2.5–4.9)
PLATELET # BLD AUTO: 206 X10*3/UL (ref 150–450)
POTASSIUM SERPL-SCNC: 4.8 MMOL/L (ref 3.5–5.3)
RBC # BLD AUTO: 2.93 X10*6/UL (ref 4.5–5.9)
SODIUM SERPL-SCNC: 138 MMOL/L (ref 136–145)
WBC # BLD AUTO: 4.8 X10*3/UL (ref 4.4–11.3)

## 2024-09-09 PROCEDURE — 1100000001 HC PRIVATE ROOM DAILY

## 2024-09-09 PROCEDURE — 83735 ASSAY OF MAGNESIUM: CPT

## 2024-09-09 PROCEDURE — 97165 OT EVAL LOW COMPLEX 30 MIN: CPT | Mod: GO

## 2024-09-09 PROCEDURE — 2500000001 HC RX 250 WO HCPCS SELF ADMINISTERED DRUGS (ALT 637 FOR MEDICARE OP)

## 2024-09-09 PROCEDURE — 2500000004 HC RX 250 GENERAL PHARMACY W/ HCPCS (ALT 636 FOR OP/ED)

## 2024-09-09 PROCEDURE — 36415 COLL VENOUS BLD VENIPUNCTURE: CPT | Performed by: NUTRITIONIST

## 2024-09-09 PROCEDURE — 97535 SELF CARE MNGMENT TRAINING: CPT | Mod: GO

## 2024-09-09 PROCEDURE — 80069 RENAL FUNCTION PANEL: CPT | Performed by: NUTRITIONIST

## 2024-09-09 PROCEDURE — 97530 THERAPEUTIC ACTIVITIES: CPT | Mod: GP,CQ

## 2024-09-09 PROCEDURE — 97116 GAIT TRAINING THERAPY: CPT | Mod: GP,CQ

## 2024-09-09 PROCEDURE — 85025 COMPLETE CBC W/AUTO DIFF WBC: CPT | Performed by: NUTRITIONIST

## 2024-09-09 ASSESSMENT — COGNITIVE AND FUNCTIONAL STATUS - GENERAL
DAILY ACTIVITIY SCORE: 17
WALKING IN HOSPITAL ROOM: A LITTLE
CLIMB 3 TO 5 STEPS WITH RAILING: TOTAL
DRESSING REGULAR LOWER BODY CLOTHING: A LOT
DRESSING REGULAR UPPER BODY CLOTHING: A LITTLE
STANDING UP FROM CHAIR USING ARMS: A LITTLE
TOILETING: A LITTLE
HELP NEEDED FOR BATHING: A LOT
TURNING FROM BACK TO SIDE WHILE IN FLAT BAD: A LITTLE
MOVING TO AND FROM BED TO CHAIR: A LITTLE
PERSONAL GROOMING: A LITTLE
MOVING FROM LYING ON BACK TO SITTING ON SIDE OF FLAT BED WITH BEDRAILS: A LITTLE
MOBILITY SCORE: 16

## 2024-09-09 ASSESSMENT — ACTIVITIES OF DAILY LIVING (ADL)
ADL_ASSISTANCE: INDEPENDENT
BATHING_ASSISTANCE: MODERATE
HOME_MANAGEMENT_TIME_ENTRY: 9

## 2024-09-09 ASSESSMENT — PAIN SCALES - GENERAL
PAINLEVEL_OUTOF10: 0 - NO PAIN

## 2024-09-09 ASSESSMENT — PAIN - FUNCTIONAL ASSESSMENT
PAIN_FUNCTIONAL_ASSESSMENT: 0-10
PAIN_FUNCTIONAL_ASSESSMENT: 0-10

## 2024-09-09 NOTE — PROGRESS NOTES
I met with Gal at the bedside regarding discharge planning and home going needs. Patient lives home with his spouse [phone] where he was previously independent with ADL's he does have a rollator and raised toilet seat in the home. Patient is recommended for moderate intensity therapy at discharge he and his spouse [facility] is [facility]. I will continue to follow with a safe discharge plan.

## 2024-09-09 NOTE — PROGRESS NOTES
Gal Cramer is a 84 y.o. male on day 3 of admission presenting with Pain in lateral portion of left knee.      Subjective   Patient seen and examined resting comfortably in bed with  at bedside. Patient denies any fever, chills, night sweats, nausea, vomiting, diarrhea, chest pain, shortness of breath, headache, dizziness. R leg wrapped in ace bandage with wound vac in place.     Objective     Last Recorded Vitals  BP (!) 171/92   Pulse 77   Temp 37.2 °C (99 °F) (Temporal)   Resp 16   Wt 75.3 kg (166 lb)   SpO2 97%   Intake/Output last 3 Shifts:    Intake/Output Summary (Last 24 hours) at 9/9/2024 1107  Last data filed at 9/9/2024 1000  Gross per 24 hour   Intake --   Output 400 ml   Net -400 ml       Admission Weight  Weight: 75.3 kg (166 lb) (09/06/24 1346)    Daily Weight  09/06/24 : 75.3 kg (166 lb)    Image Results  Lower extremity venous duplex bilateral  Narrative: Interpreted By:  Kenny Reid and Meyers Emily   STUDY:  [facility] US LOWER EXTREMITY VENOUS DUPLEX BILATERAL;  9/8/2024 10:00 am      INDICATION:  Signs/Symptoms:concern for RLE DVT.      ,I82.4Z9 Acute embolism and thrombosis of unspecified deep veins of  unspecified distal lower extremity (Multi),R60.1 Generalized edema      COMPARISON:  None.      ACCESSION NUMBER(S):  [id number]      ORDERING CLINICIAN:  EVRE MÉNDEZ      TECHNIQUE:  Vascular ultrasound of the bilateral lower extremities was performed.  Real-time compression views as well as Gray scale, color Doppler and  spectral Doppler waveform analysis was performed.      FINDINGS:  Evaluation of the visualized portions of the bilateral common femoral  vein, proximal, mid, and distal femoral vein, and popliteal vein were  performed.  Evaluation of the visualized portions of the  posterior  tibial and peroneal veins were also performed.      Limitations:  None.      The evaluated veins demonstrate normal compressibility. There is  intact venous flow  demonstrating normal respiratory variability and  normal augmentation of flow with calf compression. Therefore, there  is no ultrasonographic evidence for deep vein thrombosis within the  evaluated veins.      Wound VAC extensive edema noted about the right leg.      Impression: No sonographic evidence for deep vein thrombosis within the evaluated  veins of the bilateral lower extremities.      I personally reviewed the images/study, and I agree with the findings  as stated above. This study was interpreted at [facility], [place], [place].      MACRO:  None      Signed by: Kenny Roque 9/8/2024 12:03 PM  Dictation workstation:   [id number]      Physical Exam  Constitutional:       General: He is not in acute distress.     Appearance: Normal appearance.   HENT:      Head: Normocephalic and atraumatic.      Mouth/Throat:      Mouth: Mucous membranes are moist.   Eyes:      General: No scleral icterus.     Extraocular Movements: Extraocular movements intact.      Conjunctiva/sclera: Conjunctivae normal.      Pupils: Pupils are equal, round, and reactive to light.   Cardiovascular:      Rate and Rhythm: Normal rate and regular rhythm.      Heart sounds: No murmur heard.     No friction rub. No gallop.   Pulmonary:      Effort: Pulmonary effort is normal. No respiratory distress.      Breath sounds: Normal breath sounds. No stridor. No wheezing, rhonchi or rales.   Abdominal:      General: Abdomen is flat. Bowel sounds are normal. There is no distension.      Palpations: Abdomen is soft. There is no mass.      Tenderness: There is no abdominal tenderness. There is no guarding.   Musculoskeletal:      Right lower leg: No edema.      Left lower leg: No edema.      Comments: RLE wrapped in ace bandage and with wound vac attached   Skin:     General: Skin is warm and dry.   Neurological:      Mental Status: He is alert.      Comments: Alert and oriented to person and place.          Relevant Results    Results for orders placed or performed during the hospital encounter of 09/06/24 (from the past 24 hour(s))   Magnesium   Result Value Ref Range    Magnesium 1.99 1.60 - 2.40 mg/dL   Renal function panel   Result Value Ref Range    Glucose 88 74 - 99 mg/dL    Sodium 138 136 - 145 mmol/L    Potassium 4.8 3.5 - 5.3 mmol/L    Chloride 105 98 - 107 mmol/L    Bicarbonate 26 21 - 32 mmol/L    Anion Gap 12 10 - 20 mmol/L    Urea Nitrogen 26 (H) 6 - 23 mg/dL    Creatinine 1.81 (H) 0.50 - 1.30 mg/dL    eGFR 36 (L) >60 mL/min/1.73m*2    Calcium 9.2 8.6 - 10.6 mg/dL    Phosphorus 3.9 2.5 - 4.9 mg/dL    Albumin 3.7 3.4 - 5.0 g/dL   CBC and Auto Differential   Result Value Ref Range    WBC 4.8 4.4 - 11.3 x10*3/uL    nRBC 0.0 0.0 - 0.0 /100 WBCs    RBC 2.93 (L) 4.50 - 5.90 x10*6/uL    Hemoglobin 9.8 (L) 13.5 - 17.5 g/dL    Hematocrit 28.7 (L) 41.0 - 52.0 %    MCV 98 80 - 100 fL    MCH 33.4 26.0 - 34.0 pg    MCHC 34.1 32.0 - 36.0 g/dL    RDW 12.4 11.5 - 14.5 %    Platelets 206 150 - 450 x10*3/uL    Neutrophils % 65.6 40.0 - 80.0 %    Immature Granulocytes %, Automated 0.6 0.0 - 0.9 %    Lymphocytes % 19.6 13.0 - 44.0 %    Monocytes % 8.9 2.0 - 10.0 %    Eosinophils % 4.3 0.0 - 6.0 %    Basophils % 1.0 0.0 - 2.0 %    Neutrophils Absolute 3.17 1.60 - 5.50 x10*3/uL    Immature Granulocytes Absolute, Automated 0.03 0.00 - 0.50 x10*3/uL    Lymphocytes Absolute 0.95 0.80 - 3.00 x10*3/uL    Monocytes Absolute 0.43 0.05 - 0.80 x10*3/uL    Eosinophils Absolute 0.21 0.00 - 0.40 x10*3/uL    Basophils Absolute 0.05 0.00 - 0.10 x10*3/uL      A/P: 84 y.o. male with a hx of dementia, HTN, HLD, GERD, CKD 3, peripheral neuropathy, and BPH (self-cath 3-4x every day)  who presented to the ED today from outpatient orthopedic office, admitted to Medicine for elly-operative management. On initial assessment afebrile, HDS, with O2 sats appropriate on RA. Overall concerning for re accumulation of right knee hematoma. SIRS  negative at this time, will hold off on Abx. Blood cultures collected, follow up. If positive plan to start IV Abx. Successful I&D 9/7 with collected intra-operative Cx. DVT ultrasound not concerning for DVT.  Pending plastic surgery recs for wound closure.     Updates 9/10:  -Ortho Recs: Weightbearing RLE as tolerated, pain: per primary, recommend multimodal (Tylenol, oxycodone, dilaudid), Perioperative ABx: Ancef x 24 hr completed 9/8, Drain: WV set to suction   -DVT ppx continuing ASA 81 mg EC BID x 4 weeks per ortho, DVT ultrasound not concerning for DVT 9/8  - PT/OT following, recommendations for moderate intensity. Plan to discharge to [facility] when medically ready.  - Plastic Surgery recs: Plastic Surgery will Evaluate to decide on definitve vac management vs local advancement flap for tissue coverage  -Holding lisinopril until after plastic surgery, if applicable  - Blood Cultures NGTD 9/7/24  -Nursing communication to ensure walker in room 9/8    #R Knee hematoma with overlying ulcer  :: Knee XR with no acute fracture. Noted to have significant soft tissue swelling over anteromedial aspect of the knee   :: ESR 23, CRP 0.18   :: Patient afebrile and without a leukocytosis   -pt ambulates with walker and cane at home  -Usually follows with Dr. Schmitz outpt ortho  -Type and screen ordered (09/06), coags with AM labs 9/7  Plan:  -Continue Tylenol 650 mg q6H PRN, oxycodone 5 mg q6H PRN for moderate pain, Oxycodone 10 mg q6H PRN for severe pain   -DVT ppx continuing ASA 81 mg EC BID x 4 weeks per ortho, DVT ultrasound not concerning for DVT 9/8  -PT/OT rec mod intensity level  -Successful I&D with ortho 9/7, intra-op cx pending, blood cx collected 9/7 ngtd  -Ortho Recs: Weightbearing RLE as tolerated, Perioperative ABx: Ancef x 24 hr completed 9/8, Drain: WV set to suction   - PT/OT following  - Plastic Surgery recs: Plastic Surgery will evaluate to decide on definitve vac management vs local advancement  flap for tissue coverage  -Holding lisinopril until after plastic surgery, if applicable  -Nursing communication to ensure walker in room 9/8        #concern for RLE DVT   :: Has been on aspirin 81 mg BID for DVT Ppx, per  reports that has been given at facility   :: Exam with RLE erythema, warmth, and tenderness to palpation->High clinical suspicion for DVT of RLE   Plan:  DVT ultrasound not concerning for proximal DVT 9/8  -Ortho Recs: Weightbearing RLE as tolerated  -pain as above  -DVT ppx continuing ASA 81 mg EC BID x 4 weeks per ortho   - PT/OT following     #VEDA on CKD 3  -Cr 1.81 from 1.87 which seems close to baseline, lowest on record 1.63  -Pt on straight cath schedule at home done by , pt having   Plan:  -Encourage po intake  - agreeable to placing clark 9/8 to reduce issues with delayed straight caths, pt forgets to cath himself as his  does it for him multiple times a day at home   -Can consider starting flomax    #HTN  #HLD  Plan:  - Hold home lisinopril elly-operatively until plastics surgical intervention. Can consider coreg or hydral for elevated pressures if needed  - continue with home Pravastatin     #GERD  Plan:  - continue with home PPI      #BPH  Plan:  - continue with home finasteride      #MDD  #Dementia   -9/8 baseline a&ox2 (self, location, and some of situation)  Plan:  - continue with home donepezil, Memantine   - per patient not on lexapro, do not restart unless clinically appropriate       F: PRN for euvolemia   E: PRN K>4, Mg>2, P>3   N: reg diet  A: PIV   DVT ppx: SCDs and ASA 81 BID  GI ppx: home ppi     Code Status: DNR/DNI of for ICU (confirmed on Admission)  Surrogate Medical Decision-maker:   Navi Cavazos [phone]       Nino Andrews MD PGY-1

## 2024-09-09 NOTE — CARE PLAN
The patient's goals for the shift include pt will rate pain 3/10 or less -pt not complaining of pain    The clinical goals for the shift include pt will remain safe and utilize call light -met

## 2024-09-09 NOTE — PROGRESS NOTES
Occupational Therapy    Evaluation and Treatment    Patient Name: Gal Cramer  MRN: [id number]  Today's Date: 9/9/2024  Room: [address] [place] [address]  Time Calculation  Start Time: 0843  Stop Time: 0907  Time Calculation (min): 24 min    Assessment  IP OT Assessment  OT Assessment: Pt's abilty to complete ADLs currently limited by deficits in functional strength, activity tolerance, and dynamic balance. He demos ability to engage in ADL tasks with SBA - Mod A. Pt will benefit from continued OT while admitted to increase IND and safety prior to d/c.  Prognosis: Good  Barriers to Discharge: None  Evaluation/Treatment Tolerance: Patient tolerated treatment well  Medical Staff Made Aware: Yes  End of Session Communication: Bedside nurse  End of Session Patient Position: Up in chair, Alarm on  Plan:  Inpatient Plan  Treatment Interventions: ADL retraining, Functional transfer training, Endurance training, UE strengthening/ROM, Patient/family training, Equipment evaluation/education, Compensatory technique education  OT Frequency: 2 times per week  OT Discharge Recommendations: Moderate intensity level of continued care  OT Recommended Transfer Status: Minimal assist, Assist of 1  OT - OK to Discharge: Yes  OT Assessment  OT Assessment Results: Decreased ADL status, Decreased safe judgment during ADL, Decreased endurance, Decreased functional mobility, Decreased IADLs  Prognosis: Good  Barriers to Discharge: None  Evaluation/Treatment Tolerance: Patient tolerated treatment well  Medical Staff Made Aware: Yes  Strengths: Attitude of self, Support of Caregivers, Housing layout  Barriers to Participation: Comorbidities    Subjective   Current Problem:  1. Pain in lateral portion of left knee        2. Traumatic hematoma of right knee, initial encounter  Case Request Operating Room: Debridement Knee    Case Request Operating Room: Debridement Knee      3. Acute deep vein thrombosis (DVT) of distal vein of lower extremity, unspecified  laterality (Multi)  Lower extremity venous duplex bilateral    Lower extremity venous duplex bilateral      4. Generalized edema  Lower extremity venous duplex bilateral    Lower extremity venous duplex bilateral        General:  Reason for Referral: knee hematoma  Past Medical History Relevant to Rehab: 84 y.o. male s/p R knee debridement and wound vac placement on 9/7. h/o fall 3-4 weeks ago with hematoma and SNF stay. Now readmitted for reoccurence  Prior to Session Communication: Bedside nurse  Patient Position Received: Bed, 3 rail up, Alarm off, not on at start of session  Family/Caregiver Present: Yes  Caregiver Feedback:  present and friend  General Comment: Pt reporting he needs to go to the bathroom upon therapist's arrival.   Precautions:  LE Weight Bearing Status: Weight Bearing as Tolerated (R LE)  Medical Precautions: Fall precautions  Precautions Comment: Wound VAC R LE  Vital Signs:  Vital Signs (Past 2hrs)           Pain:  Pain Assessment  Pain Assessment: 0-10  0-10 (Numeric) Pain Score: 0 - No pain  Lines/Tubes/Drains:  Urethral Catheter Coude 14 Fr. (Active)   Number of days: 0         Objective   Cognition:  Overall Cognitive Status: Impaired at baseline  Orientation Level: Disoriented to time  Cognition Comments: Baseline dementia, pt's  reports forgetfullness and that pt appears at baseline at this time. CAM-ICU  - at this time.  Memory: Exceptions to WFL  Long-Term Memory: Impaired  Short-Term Memory: Impaired  Safety/Judgement: Exceptions to WFL  Complex Functional Tasks: Minimal  Novel Situations: Minimal  Routine Tasks: Minimal  Insight: Mild           Home Living:  Type of Home: House  Lives With: Spouse  Home Adaptive Equipment: Walker rolling or standard  Home Access: No concerns  Home Living Comments: Pt recently living in SNF, plan is to return to SNF upon d/c.   Prior Function:  Level of [place]: Independent with ADLs and functional transfers, Independent with  homemaking with ambulation  ADL Assistance: Independent  Homemaking Assistance: Independent  Ambulatory Assistance: Independent  Vocational: Retired  Leisure: Listen to music, socialize  Hand Dominance: Right  Prior Function Comments: Fall leading to admission, no other falls reported  IADL History:  Homemaking Responsibilities: Yes  Meal Prep Responsibility: Primary  Laundry Responsibility: Primary  Cleaning Responsibility: Primary  Bill Paying/Finance Responsibility: Primary  Shopping Responsibility: Secondary  Homemaking Comments: Shaes with spouse  Current License: Yes  Mode of Transportation: Family  Occupation: Retired  Leisure and Hobbies: Listen to music, socialize  ADL:  Eating Assistance: Independent (Anticipated)  Grooming Assistance: Stand by  Grooming Deficit: Verbal cueing, Wash/dry hands  Bathing Assistance: Moderate (Anticipated)  UE Dressing Assistance: Stand by (Anticipated)  LE Dressing Assistance: Moderate (Anticipated)  Toileting Assistance with Device: Minimal  Toileting Deficit: Supervison/safety  ADL Comments: SUP 2/2 decreased cognition  Activity Tolerance:  Endurance: Tolerates 10 - 20 min exercise with multiple rests  Balance:  Dynamic Standing Balance  Dynamic Standing-Balance Support: Bilateral upper extremity supported  Dynamic Standing-Level of Assistance: Close supervision  Dynamic Standing-Comments: FWW  Static Sitting Balance  Static Sitting-Balance Support: No upper extremity supported, Feet supported  Static Sitting-Level of Assistance: Distant supervision  Static Standing Balance  Static Standing-Balance Support: Bilateral upper extremity supported  Static Standing-Level of Assistance: Distant supervision  Static Standing-Comment/Number of Minutes: FWW  Bed Mobility/Transfers: Bed Mobility/Transfers: Bed Mobility  Bed Mobility: Yes  Bed Mobility 1  Bed Mobility 1: Supine to sitting  Level of Assistance 1: Contact guard  Bed Mobility Comments 1: Cues for safety   and  Transfers  Transfer: Yes  Transfer 1  Transfer From 1: Sit to, Stand to  Transfer to 1: Stand, Sit  Technique 1: Sit to stand, Stand to sit  Transfer Device 1: Walker  Transfer Level of Assistance 1: Contact guard  Trials/Comments 1: Cues to push up from bed  IADL's:   Homemaking Responsibilities: Yes  Meal Prep Responsibility: Primary  Laundry Responsibility: Primary  Cleaning Responsibility: Primary  Bill Paying/Finance Responsibility: Primary  Shopping Responsibility: Secondary  Homemaking Comments: Shaes with spouse  Current License: Yes  Mode of Transportation: Family  Occupation: Retired  Leisure and Hobbies: Listen to music, socialize  Vision: Vision - Basic Assessment  Current Vision: Wears glasses all the time   and    Sensation:  Light Touch: No apparent deficits  Strength:  Strength Comments: MICHELLE [place] WFL  Perception:  Inattention/Neglect: Appears intact  Coordination:  Movements are Fluid and Coordinated: Yes   Hand Function:  Hand Function  Gross Grasp: Functional  Coordination: Functional  Extremities:   RUE   RUE : Within Functional Limits, LUE   LUE: Within Functional Limits,  , and        Outcome Measures: [facility] Daily Activity  Putting on and taking off regular lower body clothing: A lot  Bathing (including washing, rinsing, drying): A lot  Putting on and taking off regular upper body clothing: A little  Toileting, which includes using toilet, bedpan or urinal: A little  Taking care of personal grooming such as brushing teeth: A little  Eating Meals: None  Daily Activity - Total Score: 17         ,     OT Adult Other Outcome Measures  4AT: 4 AT -    Education Documentation  Precautions, taught by Jaron Myers OT at 9/9/2024 10:17 AM.  Learner: Significant Other, Patient  Readiness: Acceptance  Method: Explanation, Demonstration  Response: Verbalizes Understanding, Needs Reinforcement    Body Mechanics, taught by Jaron Myers OT at 9/9/2024 10:17 AM.  Learner: Significant Other, Patient  Readiness:  Acceptance  Method: Explanation, Demonstration  Response: Verbalizes Understanding, Needs Reinforcement    ADL Training, taught by Jaron Myers OT at 9/9/2024 10:17 AM.  Learner: Significant Other, Patient  Readiness: Acceptance  Method: Explanation, Demonstration  Response: Verbalizes Understanding, Needs Reinforcement    Education Comments  No comments found.        Goals:   Encounter Problems       Encounter Problems (Active)       ADLs       Patient will perform UB and LB bathing with supervision level of assistance and shower chair. (Progressing)       Start:  09/09/24    Expected End:  09/30/24            Patient with complete lower body dressing with supervision level of assistance donning and doffing all LE clothes  with PRN adaptive equipment while edge of bed  and standing (Progressing)       Start:  09/09/24    Expected End:  09/30/24            Patient will complete toileting including hygiene clothing management/hygiene with supervision level of assistance and grab bars. (Progressing)       Start:  09/09/24    Expected End:  09/30/24               BALANCE       Pt will maintain dynamic standing balance during ADL task with supervision level of assistance in order to demonstrate decreased risk of falling and improved postural control. (Progressing)       Start:  09/09/24    Expected End:  09/30/24                   Treatment Completed on Evaluation  Activities of Daily Living: Toilet Transfers  Toilet Transfer From: Bed  Toilet Transfer Type: To and from  Toilet Transfer to: Standard toilet  Toilet Transfer Technique: Ambulating  Toilet Transfers: Contact guard  Toilet Transfers Comments: CGA using FWW, repeated cues required for follow through, use of grab bar  Grooming  Grooming Level of Assistance: Close supervision  Grooming Where Assessed: Standing sinkside  Grooming Comments: Standing at sink to wash hands following toileting, Education provided on walker and body positioning at sink for safety,  pt requires cues to sequence task and locate items for task completion. No LOB throughout.      Toileting  Toileting Level of Assistance: Minimum assistance  Where Assessed: Toilet  Toileting Comments: Pt benefits from Min A for stability while standing to manage clothing, SBA while weight shifting to perform hygiene. Cues for increased safety.    09/09/24 at 10:18 AM   Jaron Myers OT   Rehab Office: [phone]

## 2024-09-09 NOTE — PROGRESS NOTES
Physical Therapy    Physical Therapy Treatment    Patient Name: Gal Cramer  MRN: [id number]  Today's Date: 9/9/2024  Time Calculation  Start Time: 1412  Stop Time: 1437  Time Calculation (min): 25 min         Assessment/Plan   PT Assessment  End of Session Communication: Bedside nurse  Assessment Comment: .  End of Session Patient Position: Up in chair, Alarm on     PT Plan  Treatment/Interventions: Bed mobility, Transfer training, Gait training, Balance training, Strengthening, Endurance training, Therapeutic exercise, Therapeutic activity  PT Plan: Ongoing PT  PT Eval Only Reason: At baseline function  PT Frequency: 5 times per week  PT Discharge Recommendations: Moderate intensity level of continued care  Equipment Recommended upon Discharge: Wheeled walker  PT Recommended Transfer Status: Assist x1  PT - OK to Discharge: Yes      General Visit Information:   PT  Visit  PT Received On: 09/09/24  General  Prior to Session Communication: Bedside nurse  Patient Position Received: Bed, 3 rail up, Alarm off, not on at start of session  General Comment: pt supine in bed upon entering, pt's  present. pt AxOx2 unable to identify date or situation. pt ambulates 100' using wheeled walker however requires several standing rest breaks to complete distance    Subjective   Precautions:  Precautions  LE Weight Bearing Status: Weight Bearing as Tolerated  Medical Precautions: Fall precautions  Precautions Comment: Wound VAC R LE    Vital Signs (Past 2hrs)                 Objective   Pain:  Pain Assessment  0-10 (Numeric) Pain Score: 0 - No pain  Cognition:  Cognition  Orientation Level: Disoriented to time       Treatments:       Bed Mobility 1  Bed Mobility 1: Supine to sitting, Sitting to supine  Level of Assistance 1: Contact guard  Bed Mobility Comments 1: cues for log roll to increase independence    Ambulation/Gait Training 1  Surface 1: Level tile  Device 1: Rolling walker  Assistance 1: Minimum  assistance  Quality of Gait 1: Diminished heel strike, Decreased step length, Shuffling gait  Comments/Distance (ft) 1: 1x100, 1x40. cues for walker safety, energy conservation, and improved L sstep lenght  Transfer 1  Technique 1: Sit to stand, Stand to sit  Transfer Device 1: Walker  Transfer Level of Assistance 1: Contact guard  Trials/Comments 1: cues for safe pacing.         Outcome Measures:  [facility] Basic Mobility  Turning from your back to your side while in a flat bed without using bedrails: A little  Moving from lying on your back to sitting on the side of a flat bed without using bedrails: A little  Moving to and from bed to chair (including a wheelchair): A little  Standing up from a chair using your arms (e.g. wheelchair or bedside chair): A little  To walk in hospital room: A little  Climbing 3-5 steps with railing: Total  Basic Mobility - Total Score: 16    Education Documentation  Mobility Training, taught by Dayron Angela PTA at 9/9/2024  4:06 PM.  Learner: Patient  Readiness: Acceptance  Method: Explanation  Response: Verbalizes Understanding    Education Comments  No comments found.        OP EDUCATION:       Encounter Problems       Encounter Problems (Active)       Mobility       STG - Patient will ambulate >75ft, wheeled walker, min assist (Progressing)       Start:  09/08/24    Expected End:  09/15/24               PT Transfers       STG - Patient to transfer to and from sit to supine SBA (Progressing)       Start:  09/08/24    Expected End:  09/15/24            STG - Patient will transfer sit to and from stand SBA (Progressing)       Start:  09/08/24    Expected End:  09/15/24               Pain - Adult

## 2024-09-09 NOTE — PROGRESS NOTES
Per [facility], patient is from [facility] and spouse is agreeable for patient to return at discharge. Return SNF referral submitted for review. Pending if anything is needed for patient to return.    NATE Dennis

## 2024-09-10 PROBLEM — N40.1 BENIGN PROSTATIC HYPERPLASIA WITH URINARY OBSTRUCTION: Status: ACTIVE | Noted: 2024-09-07

## 2024-09-10 PROBLEM — N13.8 BENIGN PROSTATIC HYPERPLASIA WITH URINARY OBSTRUCTION: Status: ACTIVE | Noted: 2024-09-07

## 2024-09-10 PROBLEM — N18.31 STAGE 3A CHRONIC KIDNEY DISEASE (MULTI): Status: ACTIVE | Noted: 2024-09-10

## 2024-09-10 PROBLEM — N17.9 AKI (ACUTE KIDNEY INJURY) (CMS-HCC): Status: ACTIVE | Noted: 2024-09-10

## 2024-09-10 LAB
ALBUMIN SERPL BCP-MCNC: 3.7 G/DL (ref 3.4–5)
ANION GAP SERPL CALC-SCNC: 12 MMOL/L (ref 10–20)
BASOPHILS # BLD AUTO: 0.04 X10*3/UL (ref 0–0.1)
BASOPHILS NFR BLD AUTO: 1 %
BUN SERPL-MCNC: 28 MG/DL (ref 6–23)
CALCIUM SERPL-MCNC: 9.4 MG/DL (ref 8.6–10.6)
CHLORIDE SERPL-SCNC: 102 MMOL/L (ref 98–107)
CO2 SERPL-SCNC: 28 MMOL/L (ref 21–32)
CREAT SERPL-MCNC: 1.59 MG/DL (ref 0.5–1.3)
EGFRCR SERPLBLD CKD-EPI 2021: 42 ML/MIN/1.73M*2
EOSINOPHIL # BLD AUTO: 0.26 X10*3/UL (ref 0–0.4)
EOSINOPHIL NFR BLD AUTO: 6.3 %
ERYTHROCYTE [DISTWIDTH] IN BLOOD BY AUTOMATED COUNT: 12.7 % (ref 11.5–14.5)
GLUCOSE SERPL-MCNC: 87 MG/DL (ref 74–99)
HCT VFR BLD AUTO: 28.3 % (ref 41–52)
HGB BLD-MCNC: 9.9 G/DL (ref 13.5–17.5)
IMM GRANULOCYTES # BLD AUTO: 0.02 X10*3/UL (ref 0–0.5)
IMM GRANULOCYTES NFR BLD AUTO: 0.5 % (ref 0–0.9)
LYMPHOCYTES # BLD AUTO: 1.08 X10*3/UL (ref 0.8–3)
LYMPHOCYTES NFR BLD AUTO: 26 %
MAGNESIUM SERPL-MCNC: 2.02 MG/DL (ref 1.6–2.4)
MCH RBC QN AUTO: 35.1 PG (ref 26–34)
MCHC RBC AUTO-ENTMCNC: 35 G/DL (ref 32–36)
MCV RBC AUTO: 100 FL (ref 80–100)
MONOCYTES # BLD AUTO: 0.38 X10*3/UL (ref 0.05–0.8)
MONOCYTES NFR BLD AUTO: 9.2 %
NEUTROPHILS # BLD AUTO: 2.37 X10*3/UL (ref 1.6–5.5)
NEUTROPHILS NFR BLD AUTO: 57 %
NRBC BLD-RTO: 0 /100 WBCS (ref 0–0)
PHOSPHATE SERPL-MCNC: 3.5 MG/DL (ref 2.5–4.9)
PLATELET # BLD AUTO: 202 X10*3/UL (ref 150–450)
POTASSIUM SERPL-SCNC: 4.9 MMOL/L (ref 3.5–5.3)
RBC # BLD AUTO: 2.82 X10*6/UL (ref 4.5–5.9)
SARS-COV-2 RNA RESP QL NAA+PROBE: NOT DETECTED
SODIUM SERPL-SCNC: 137 MMOL/L (ref 136–145)
WBC # BLD AUTO: 4.2 X10*3/UL (ref 4.4–11.3)

## 2024-09-10 PROCEDURE — 80069 RENAL FUNCTION PANEL: CPT | Performed by: NUTRITIONIST

## 2024-09-10 PROCEDURE — 2500000001 HC RX 250 WO HCPCS SELF ADMINISTERED DRUGS (ALT 637 FOR MEDICARE OP)

## 2024-09-10 PROCEDURE — 2500000004 HC RX 250 GENERAL PHARMACY W/ HCPCS (ALT 636 FOR OP/ED)

## 2024-09-10 PROCEDURE — 99232 SBSQ HOSP IP/OBS MODERATE 35: CPT

## 2024-09-10 PROCEDURE — 85025 COMPLETE CBC W/AUTO DIFF WBC: CPT | Performed by: NUTRITIONIST

## 2024-09-10 PROCEDURE — 99231 SBSQ HOSP IP/OBS SF/LOW 25: CPT

## 2024-09-10 PROCEDURE — 36415 COLL VENOUS BLD VENIPUNCTURE: CPT | Performed by: NUTRITIONIST

## 2024-09-10 PROCEDURE — 87635 SARS-COV-2 COVID-19 AMP PRB: CPT

## 2024-09-10 PROCEDURE — 83735 ASSAY OF MAGNESIUM: CPT

## 2024-09-10 PROCEDURE — 1100000001 HC PRIVATE ROOM DAILY

## 2024-09-10 RX ORDER — ASPIRIN 81 MG/1
TABLET ORAL
Start: 2024-09-10 | End: 2025-10-05

## 2024-09-10 RX ORDER — PANTOPRAZOLE SODIUM 40 MG/1
40 TABLET, DELAYED RELEASE ORAL
Start: 2024-09-10

## 2024-09-10 SDOH — ECONOMIC STABILITY: HOUSING INSECURITY: IN THE PAST 12 MONTHS, HOW MANY TIMES HAVE YOU MOVED WHERE YOU WERE LIVING?: 1

## 2024-09-10 SDOH — ECONOMIC STABILITY: HOUSING INSECURITY: AT ANY TIME IN THE PAST 12 MONTHS, WERE YOU HOMELESS OR LIVING IN A SHELTER (INCLUDING NOW)?: NO

## 2024-09-10 SDOH — SOCIAL STABILITY: SOCIAL INSECURITY: WERE YOU ABLE TO COMPLETE ALL THE BEHAVIORAL HEALTH SCREENINGS?: YES

## 2024-09-10 SDOH — ECONOMIC STABILITY: INCOME INSECURITY: IN THE LAST 12 MONTHS, WAS THERE A TIME WHEN YOU WERE NOT ABLE TO PAY THE MORTGAGE OR RENT ON TIME?: NO

## 2024-09-10 SDOH — SOCIAL STABILITY: SOCIAL INSECURITY: DO YOU FEEL ANYONE HAS EXPLOITED OR TAKEN ADVANTAGE OF YOU FINANCIALLY OR OF YOUR PERSONAL PROPERTY?: NO

## 2024-09-10 SDOH — ECONOMIC STABILITY: TRANSPORTATION INSECURITY
IN THE PAST 12 MONTHS, HAS LACK OF TRANSPORTATION KEPT YOU FROM MEETINGS, WORK, OR FROM GETTING THINGS NEEDED FOR DAILY LIVING?: NO

## 2024-09-10 SDOH — ECONOMIC STABILITY: INCOME INSECURITY: HOW HARD IS IT FOR YOU TO PAY FOR THE VERY BASICS LIKE FOOD, HOUSING, MEDICAL CARE, AND HEATING?: NOT HARD AT ALL

## 2024-09-10 SDOH — SOCIAL STABILITY: SOCIAL INSECURITY: DOES ANYONE TRY TO KEEP YOU FROM HAVING/CONTACTING OTHER FRIENDS OR DOING THINGS OUTSIDE YOUR HOME?: NO

## 2024-09-10 SDOH — SOCIAL STABILITY: SOCIAL INSECURITY: ARE THERE ANY APPARENT SIGNS OF INJURIES/BEHAVIORS THAT COULD BE RELATED TO ABUSE/NEGLECT?: NO

## 2024-09-10 SDOH — SOCIAL STABILITY: SOCIAL INSECURITY: HAVE YOU HAD ANY THOUGHTS OF HARMING ANYONE ELSE?: NO

## 2024-09-10 SDOH — ECONOMIC STABILITY: TRANSPORTATION INSECURITY
IN THE PAST 12 MONTHS, HAS THE LACK OF TRANSPORTATION KEPT YOU FROM MEDICAL APPOINTMENTS OR FROM GETTING MEDICATIONS?: NO

## 2024-09-10 SDOH — SOCIAL STABILITY: SOCIAL INSECURITY: ARE YOU OR HAVE YOU BEEN THREATENED OR ABUSED PHYSICALLY, EMOTIONALLY, OR SEXUALLY BY ANYONE?: NO

## 2024-09-10 SDOH — SOCIAL STABILITY: SOCIAL INSECURITY: DO YOU FEEL UNSAFE GOING BACK TO THE PLACE WHERE YOU ARE LIVING?: NO

## 2024-09-10 SDOH — SOCIAL STABILITY: SOCIAL INSECURITY: ABUSE: ADULT

## 2024-09-10 SDOH — SOCIAL STABILITY: SOCIAL INSECURITY: HAVE YOU HAD THOUGHTS OF HARMING ANYONE ELSE?: NO

## 2024-09-10 SDOH — SOCIAL STABILITY: SOCIAL INSECURITY: HAS ANYONE EVER THREATENED TO HURT YOUR FAMILY OR YOUR PETS?: NO

## 2024-09-10 ASSESSMENT — ACTIVITIES OF DAILY LIVING (ADL)
HEARING - RIGHT EAR: HEARING AID
ASSISTIVE_DEVICE: EYEGLASSES;HEARING AID - LEFT
WALKS IN HOME: NEEDS ASSISTANCE
ADEQUATE_TO_COMPLETE_ADL: YES
GROOMING: INDEPENDENT
PATIENT'S MEMORY ADEQUATE TO SAFELY COMPLETE DAILY ACTIVITIES?: NO
BATHING: INDEPENDENT
HEARING - LEFT EAR: HEARING AID
DRESSING YOURSELF: INDEPENDENT
JUDGMENT_ADEQUATE_SAFELY_COMPLETE_DAILY_ACTIVITIES: NO
TOILETING: INDEPENDENT
FEEDING YOURSELF: INDEPENDENT

## 2024-09-10 ASSESSMENT — COGNITIVE AND FUNCTIONAL STATUS - GENERAL
DAILY ACTIVITIY SCORE: 24
WALKING IN HOSPITAL ROOM: A LITTLE
MOBILITY SCORE: 19
MOVING TO AND FROM BED TO CHAIR: A LITTLE
STANDING UP FROM CHAIR USING ARMS: A LITTLE
CLIMB 3 TO 5 STEPS WITH RAILING: A LOT
PATIENT BASELINE BEDBOUND: NO

## 2024-09-10 ASSESSMENT — LIFESTYLE VARIABLES
HOW OFTEN DO YOU HAVE A DRINK CONTAINING ALCOHOL: MONTHLY OR LESS
AUDIT-C TOTAL SCORE: 1
SUBSTANCE_ABUSE_PAST_12_MONTHS: NO
SKIP TO QUESTIONS 9-10: 1
HOW MANY STANDARD DRINKS CONTAINING ALCOHOL DO YOU HAVE ON A TYPICAL DAY: 1 OR 2
HOW OFTEN DO YOU HAVE 6 OR MORE DRINKS ON ONE OCCASION: NEVER
PRESCIPTION_ABUSE_PAST_12_MONTHS: NO
AUDIT-C TOTAL SCORE: 1

## 2024-09-10 ASSESSMENT — PAIN SCALES - GENERAL
PAINLEVEL_OUTOF10: 0 - NO PAIN
PAINLEVEL_OUTOF10: 0 - NO PAIN

## 2024-09-10 ASSESSMENT — PATIENT HEALTH QUESTIONNAIRE - PHQ9
1. LITTLE INTEREST OR PLEASURE IN DOING THINGS: NOT AT ALL
SUM OF ALL RESPONSES TO PHQ9 QUESTIONS 1 & 2: 0
2. FEELING DOWN, DEPRESSED OR HOPELESS: NOT AT ALL

## 2024-09-10 NOTE — DISCHARGE INSTRUCTIONS
Dear Mr. Cramer,    It was a pleasure to care for you. You were admitted to the hospital for your right knee hematoma. While here, the surgical team drained this collection of fluid and placed a wound vac. The plastic surgery team is recommending follow up in 2 weeks. I have also scheduled a primary care appointment for you as well. Please see the appointments below. You may bear weight on your right leg as tolerated.     Follow up appointments:  SEP    23 PLASTIC SURGERY FOLLOW UP ED  Monday Sep 23, 2024 10:00 AM  You should arrive 15 minutes prior to your appointment and check-in at the registration desk.    Please bring a Photo ID and your insurance card to your appointment.    Our office is cashless. Payments can be made by card, Apple Pay or Global Locate, or you can pay online during eCheck-in. [facility]  [address]  [facility] [address]  [phone]   SEP    25 Primary Care New Patient with Sancho Santos  Wednesday Sep 25, 2024 9:40 AM  You should arrive 15 minutes prior to your appointment and check-in at the registration desk.    Please bring a Photo ID and your insurance card to your appointment.    Our office is cashless. Payments can be made by card, Apple Pay or Global Locate, or you can pay online during eCheck-in.  [facility]  [address]  [facility] [id number] [phone]       Please return to the emergency department with any worsening symptoms.    Thank you,  Your  Care Team

## 2024-09-10 NOTE — PROGRESS NOTES
Bruno Meléndez asking if patient will need wound vac and/or iv abx at discharge. SW notified [facility]. Per [facility], patient is not medically ready for discharge. SW sent updates to SNF. Will continue to follow.     1605-[facility] confirmed patient will need traditional wound vac at discharge and will not need iv abx. ARIEL updates Bruno Meléndez. Covid test has been ordered. SW will continue to follow.     NATE Dennis

## 2024-09-10 NOTE — PROGRESS NOTES
Subjective   Patient seen and examined resting comfortably in bed this AM. Patient denies any fever, chills, night sweats, nausea, vomiting, diarrhea, chest pain, shortness of breath, headache, dizziness. R leg wrapped in ace bandage with wound vac in place. Patient alert and oriented to person and place, states he usually has to look at the newspaper to figure out what year it is.    Objective     Last Recorded Vitals  /76 (BP Location: Left arm, Patient Position: Lying)   Pulse 71   Temp 36.5 °C (97.7 °F) (Temporal)   Resp 19   Wt 75.3 kg (166 lb)   SpO2 98%   Intake/Output last 3 Shifts:    Intake/Output Summary (Last 24 hours) at 9/10/2024 0815  Last data filed at 9/9/2024 2317  Gross per 24 hour   Intake --   Output 950 ml   Net -950 ml       Admission Weight  Weight: 75.3 kg (166 lb) (09/06/24 1346)    Daily Weight  09/06/24 : 75.3 kg (166 lb)    Image Results  Lower extremity venous duplex bilateral  Narrative: Interpreted By:  Kenny Reid and Meyers Emily   STUDY:  [facility] US LOWER EXTREMITY VENOUS DUPLEX BILATERAL;  9/8/2024 10:00 am      INDICATION:  Signs/Symptoms:concern for RLE DVT.      ,I82.4Z9 Acute embolism and thrombosis of unspecified deep veins of  unspecified distal lower extremity (Multi),R60.1 Generalized edema      COMPARISON:  None.      ACCESSION NUMBER(S):  [id number]      ORDERING CLINICIAN:  EVER MÉNDEZ      TECHNIQUE:  Vascular ultrasound of the bilateral lower extremities was performed.  Real-time compression views as well as Gray scale, color Doppler and  spectral Doppler waveform analysis was performed.      FINDINGS:  Evaluation of the visualized portions of the bilateral common femoral  vein, proximal, mid, and distal femoral vein, and popliteal vein were  performed.  Evaluation of the visualized portions of the  posterior  tibial and peroneal veins were also performed.      Limitations:  None.      The evaluated veins demonstrate normal  compressibility. There is  intact venous flow demonstrating normal respiratory variability and  normal augmentation of flow with calf compression. Therefore, there  is no ultrasonographic evidence for deep vein thrombosis within the  evaluated veins.      Wound VAC extensive edema noted about the right leg.      Impression: No sonographic evidence for deep vein thrombosis within the evaluated  veins of the bilateral lower extremities.      I personally reviewed the images/study, and I agree with the findings  as stated above. This study was interpreted at [facility], [place], [place].      MACRO:  None      Signed by: Kenny Roque 9/8/2024 12:03 PM  Dictation workstation:   [id number]      Physical Exam  Constitutional:       General: He is not in acute distress.     Appearance: Normal appearance.   HENT:      Head: Normocephalic and atraumatic.      Mouth/Throat:      Mouth: Mucous membranes are moist.   Eyes:      General: No scleral icterus.     Extraocular Movements: Extraocular movements intact.      Conjunctiva/sclera: Conjunctivae normal.      Pupils: Pupils are equal, round, and reactive to light.   Cardiovascular:      Rate and Rhythm: Normal rate and regular rhythm.      Heart sounds: No murmur heard.     No friction rub. No gallop.   Pulmonary:      Effort: Pulmonary effort is normal. No respiratory distress.      Breath sounds: Normal breath sounds. No stridor. No wheezing, rhonchi or rales.   Abdominal:      General: Abdomen is flat. Bowel sounds are normal. There is no distension.      Palpations: Abdomen is soft. There is no mass.      Tenderness: There is no abdominal tenderness. There is no guarding.   Musculoskeletal:      Right lower leg: No edema.      Left lower leg: No edema.      Comments: RLE wrapped in ace bandage and with wound vac attached   Skin:     General: Skin is warm and dry.   Neurological:      Mental Status: He is alert.       Comments: Alert and oriented to person and place.         Relevant Results    No results found for this or any previous visit (from the past 24 hour(s)).     A/P: 84 y.o. male with a hx of dementia, HTN, HLD, GERD, CKD 3, peripheral neuropathy, and BPH (self-cath 3-4x every day)  who presented to the ED today from outpatient orthopedic office, admitted to Medicine for elly-operative management. On initial assessment afebrile, HDS, with O2 sats appropriate on RA. Overall concerning for re accumulation of right knee hematoma. SIRS negative at this time, will hold off on Abx. Blood cultures collected, follow up. If positive plan to start IV Abx. Successful I&D 9/7 with collected intra-operative Cx. DVT ultrasound not concerning for DVT.  Pending plastic surgery recs for wound closure.     Updates 9/10:  -Ortho Recs: Weightbearing RLE as tolerated, pain: per primary, recommend multimodal (Tylenol, oxycodone, dilaudid), Perioperative ABx: Ancef x 24 hr completed 9/8, Drain: WV set to suction   -DVT ppx continuing ASA 81 mg EC BID x 4 weeks per ortho, DVT ultrasound not concerning for DVT 9/8  - PT/OT following, recommendations for moderate intensity. Plan to discharge to [facility] when medically ready.  - Plastic Surgery recs: Plastic Surgery will Evaluate to decide on definitve vac management vs local advancement flap for tissue coverage  -Wound care to manage wound vac for now and reach out to plastics with plans for removal/change    #R Knee hematoma with overlying ulcer  :: Knee XR with no acute fracture. Noted to have significant soft tissue swelling over anteromedial aspect of the knee   :: ESR 23, CRP 0.18   :: Patient afebrile and without a leukocytosis   -pt ambulates with walker and cane at home  -Usually follows with Dr. Schmitz outpt ortho  -Type and screen ordered (09/06), coags with AM labs 9/7  Plan:  -Continue Tylenol 650 mg q6H PRN, oxycodone 5 mg q6H PRN for moderate pain, Oxycodone 10 mg q6H PRN  for severe pain   -DVT ppx continuing ASA 81 mg EC BID x 4 weeks per ortho, DVT ultrasound not concerning for DVT 9/8  -PT/OT rec mod intensity level  -Successful I&D with ortho 9/7, intra-op cx pending, blood cx collected 9/7 ngtd  -Ortho Recs: Weightbearing RLE as tolerated, Perioperative ABx: Ancef x 24 hr completed 9/8, Drain: WV set to suction   - PT/OT following  - Plastic Surgery recs: Plastic Surgery will evaluate to decide on definitve vac management vs local advancement flap for tissue coverage  -Holding lisinopril until after plastic surgery, if applicable  -Nursing communication to ensure walker in room 9/8        #concern for RLE DVT   :: Has been on aspirin 81 mg BID for DVT Ppx, per  reports that has been given at facility   :: Exam with RLE erythema, warmth, and tenderness to palpation->High clinical suspicion for DVT of RLE   Plan:  DVT ultrasound not concerning for proximal DVT 9/8  -DVT ppx continuing ASA 81 mg EC BID x 4 weeks per ortho   - PT/OT following     #VEDA on CKD 3, resolving  -Cr 1.81 from 1.87 which seems close to baseline, lowest on record 1.63  -Pt on straight cath schedule at home done by , pt having   Plan:  -Encourage po intake  - agreeable to placing clark 9/8 to reduce issues with delayed straight caths, pt forgets to cath himself as his  does it for him multiple times a day at home   -Can consider starting flomax    #HTN  #HLD  Plan:  - Hold home lisinopril elly-operatively until plastics surgical intervention. Can consider coreg or hydral for elevated pressures if needed  - continue with home Pravastatin     #GERD  Plan:  - continue with home PPI      #BPH  Plan:  - continue with home finasteride      #MDD  #Dementia   -9/8 baseline a&ox2 (self, location, and some of situation)  Plan:  - continue with home donepezil, Memantine   - per patient not on lexapro, do not restart unless clinically appropriate       F: PRN for euvolemia   E: PRN K>4, Mg>2,  P>3   N: reg diet  A: PIV   DVT ppx: SCDs and ASA 81 BID  GI ppx: home ppi     Code Status: DNR/DNI of for ICU (confirmed on Admission)  Surrogate Medical Decision-maker:   Navi Cavazos [phone]       Nino Andrews MD PGY-1

## 2024-09-10 NOTE — CARE PLAN
Patient: Yasmany Mendez Date: 2023   : 1963 Attending: Kev Carver MD   59 year old male      Chief Complaint   Patient presents with    Seizures       History of Present Illness:    Mr. 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 On 23 patient presented to ED from his Assisted Living facility for recurrent seizure activity 23, and tonic clonic seizure activity also present during ambulance ride en-route to hospital so IV versed was administered.  Of note, patient had seizures at facility 23 prompting ED visit and correction of hypo-magnesium of 1.3, and was discharged back to facility.  Substitute Decision Maker is activated.  Most recent MRI brain 3T w/perfusion 23 is stable with expected post-op and treatment related changes.  Most recent CT head wo/contrast 23 does not show any intracranial abnormality. CT chest 23 shows mild venous congestion. CT C-spine wo/contrast 23 does not show acute fracture or vertebral subluxation. At time of visit today patient asleep in bed, awakes to soft touch and voice. unable to provide meaningful subjective information and no family at bedside.       Review of Systems:  See history of present illness. All organ systems reviewed in detail. See history of present illness for pertinent positives and negatives.       Vital Last Value 24 Hour Range   Temperature 98.8 °F (37.1 °C) Temp  Min: 97.7 °F (36.5 °C)  Max: 98.8 °F (37.1 °C)   Pulse (!) 100 Pulse  Min: 74  Max: 100   Respiratory 16 Resp  Min: 16  Max: 16   Non-Invasive  Blood Pressure 131/54 (23 1027) BP  Min: 126/79  Max: 144/81   Pulse Oximetry 92 % SpO2  Min: 92 %  Max: 97 %     Vital Today Admitted   Weight 84.6 kg (186 lb 8.2 oz) Weight: 88 kg (194 lb 0.1 oz)   Height N/A Height: 5' 8\" (172.7  The patient's goals for the shift include  keep pain minimal throughout shift.     The clinical goals for the shift include Patient Will remain fall free this shift      Problem: Fall/Injury  Goal: Not fall by end of shift  Outcome: Progressing  Goal: Be free from injury by end of the shift  Outcome: Progressing  Goal: Verbalize understanding of personal risk factors for fall in the hospital  Outcome: Progressing  Goal: Verbalize understanding of risk factor reduction measures to prevent injury from fall in the home  Outcome: Progressing  Goal: Use assistive devices by end of the shift  Outcome: Progressing  Goal: Pace activities to prevent fatigue by end of the shift  Outcome: Progressing     Problem: Pain  Goal: Takes deep breaths with improved pain control throughout the shift  Outcome: Progressing  Goal: Turns in bed with improved pain control throughout the shift  Outcome: Progressing  Goal: Walks with improved pain control throughout the shift  Outcome: Progressing  Goal: Performs ADL's with improved pain control throughout shift  Outcome: Progressing  Goal: Participates in PT with improved pain control throughout the shift  Outcome: Progressing  Goal: Free from opioid side effects throughout the shift  Outcome: Progressing  Goal: Free from acute confusion related to pain meds throughout the shift  Outcome: Progressing     Problem: Pain - Adult  Goal: Verbalizes/displays adequate comfort level or baseline comfort level  Outcome: Progressing     Problem: Safety - Adult  Goal: Free from fall injury  Outcome: Progressing     Problem: Discharge Planning  Goal: Discharge to home or other facility with appropriate resources  Outcome: Progressing     Problem: Chronic Conditions and Co-morbidities  Goal: Patient's chronic conditions and co-morbidity symptoms are monitored and maintained or improved  Outcome: Progressing      cm)   Intake/Output:    Intake/Output Summary (Last 24 hours) at 12/12/2023 1040  Last data filed at 12/12/2023 0530  Gross per 24 hour   Intake --   Output 1100 ml   Net -1100 ml       Physical Examination:  Yasmany Mendez is a59 year old male who is alert, oriented times 3, in no apparent distress.  Vitals as above  MOUTH/THROAT: no oral lesions  LUNGS: non-labored breathing, 2 liters of supplemental 02 via nasal canula  HEART: S1 and S2 normal  ABDOMEN: soft and non tender, BS present  NEUROLOGIC: mixed aphasia, able to squeeze hand to instruction on left, otherwise did not follow instruction, right severe hemiparesis  EXTREMITIES: no edema  SKIN: no rash or petechiae    Laboratory Results:  Recent Labs   Lab 12/08/23  0353 12/07/23  0414 12/06/23  0515   WBC 10.2 10.0 10.4   RBC 3.83* 4.00* 3.92*   HCT 33.8* 35.5* 35.4*   HGB 10.8* 11.2* 10.9*    363 372   MCV 88.3 88.8 90.3   MCH 28.2 28.0 27.8   RDWCV 14.3 14.4 14.5     Recent Labs   Lab 12/11/23  0404 12/10/23  0331 12/08/23  0353   SODIUM 143 144 141   POTASSIUM 4.1 3.9 3.8   CHLORIDE 110 110 107   CO2 30 31 27   GLUCOSE 142* 108* 241*   BUN 38* 41* 42*   CREATININE 1.33* 1.40* 1.24*   CALCIUM 9.4 9.4 9.6   ALBUMIN  --   --  2.9*   MG 2.3 2.2  --    BILIRUBIN  --   --  0.5   ALKPT  --   --  93   AST  --   --  24   GPT  --   --  43       Assessment and Plan:  Mr. Yasmany Mendez is a 59 year old male from [place], [place] with a history of seizures and WHO Grade III anaplastic astrocytoma (diagnosed 10/2011) admitted with breakthrough seizure activity.  Patient underwent EEG 11/30/23-12/02/23 and had some seizure activity.  EEG now discontinued and no seizure activity noted at time of visit.  Patient appears to have a poor ECOG status with artificial nutrition via NG tube, however writer not sure what patient's baseline is.  GOC conversation is appropriate between Neuro Oncologist and activated SDM.  Video swallow study completed last week shows silent  aspiration with thin liquids. NG tube now discontinued. Most recent CT head imaging appears stable with mild edema involving the left cerebral hemisphere. A repeat MRI brain 3T w/wo contrast w/perfusion was completed with anesthesia monitored sedation 12/11/23 and is stable, no new lesions, expected right frontal surgical cavity and treatment related changes. Recommend ongoing surveillance with repeat MRI brain in 3 months. Will await recommendation from Neuro Oncologist. Patient no longer a candidate for bevacizumab given his vascular and cardiac profile. Activated SDM is his brother whom Dr. Perez planning to call.       -Seizure control; Vimpat, Keppra, Trileptal  -Palliative following for GOC  -Await further recommendations from Dr. Perez      Discussed with or notes reviewed:  Patient  SERENA Peña

## 2024-09-10 NOTE — CONSULTS
Wound Care Consult     Visit Date: 9/10/2024      Patient Name: Gal Cramer         MRN: [id number]           YOB: 1940     Reason for Consult: Wound vac dressing change of the right knee         Wound History: 84 y.o. male with a past medical history of dementia, HTN, HLD, GERD, peripheral neuropathy, and BPH (self-cath 3-4x every day) who presented to the ED 9/6 from the outpatient orthopedic office given c/f recurrent right knee hematoma.      Pertinent Labs:   Albumin   Date Value Ref Range Status   09/10/2024 3.7 3.4 - 5.0 g/dL Final       Wound Assessment:  Wound 09/07/24 Incision Knee Right (Active)   Wound Image   09/10/24 1138   Site Assessment Bleeding;Granulation;Red 09/10/24 1138   Carmen-Wound Assessment Clean;Dry 09/10/24 1138   Shape Round 09/10/24 1138   Wound Length (cm) 5.5 cm 09/10/24 1138   Wound Width (cm) 5 cm 09/10/24 1138   Wound Surface Area (cm^2) 27.5 cm^2 09/10/24 1138   Wound Depth (cm) 1.4 cm 09/10/24 1138   Wound Volume (cm^3) 38.5 cm^3 09/10/24 1138   State of Healing Undermining;Healing ridge 09/10/24 1138   Undermining 4 cm 09/10/24 1138   Margins Well-defined edges 09/10/24 1138   Closure Open to air 09/10/24 1138   Drainage Description Serosanguineous 09/10/24 1138   Drainage Amount Small 09/10/24 1138   Dressing Vacuum dressing 09/10/24 1138   Dressing Changed New 09/10/24 1138   Dressing Status Clean;Dry 09/10/24 1138        09/10/24 1154   Negative Pressure Wound Therapy Knee Right   Placement Date: 09/07/24   Placed by: DR. URBINA  Hand Hygiene Completed: Yes  Location: Knee  Wound Location Orientation: Right   Unit Type VAC Ulta    Dressing Type Black foam;White foam   Number of Foam Pieces Used 2   Cycle Continuous   Target Pressure (mmHg) 125   Canister Changed No     Wound Vac applied to Right knee.  (1) white foam (1)  black foam  Pressure; -125mmHg  Plan:  Change wound Vac Change on Friday 9/13  If patient is discharged prior to next dressing change,  please disconnect VAC machine, remove foam dressing, and place machine in the soiled utility room on the unit. Call [phone] for pickup immediately upon removal.   Pack wound with wet-to moist NS kerlix and cover with a dry sterile dressing. Patient cannot leave hospital with VAC    Wound Team Summary Assessment: The wound care team came to bedside to assess the patient's right knee wound and replace the wound vac dressing.  Plastic surgery at bedside to assess the wound. The wound was cleansed with vashe wound cleanser and prepped with 3M cavilon skin barrier film. The wound is clean, moist and red with granulation tissue.  The wound also has a 4cm undermine from 11-2 o'clock that was filled with white foam. 1 piece of black was used to fill the wound and covered with drape dressing. The wound vas is set on Continuous -125mmHg.      Wound Team Plan: Next vac change is 9/13      ROSEY Otero  9/10/2024  1:20 PM

## 2024-09-10 NOTE — CARE PLAN
Problem: Fall/Injury  Goal: Not fall by end of shift  Outcome: Progressing  Goal: Be free from injury by end of the shift  Outcome: Progressing  Goal: Verbalize understanding of personal risk factors for fall in the hospital  Outcome: Progressing  Goal: Verbalize understanding of risk factor reduction measures to prevent injury from fall in the home  Outcome: Progressing  Goal: Use assistive devices by end of the shift  Outcome: Progressing  Goal: Pace activities to prevent fatigue by end of the shift  Outcome: Progressing     Problem: Pain  Goal: Takes deep breaths with improved pain control throughout the shift  Outcome: Progressing  Goal: Turns in bed with improved pain control throughout the shift  Outcome: Progressing  Goal: Walks with improved pain control throughout the shift  Outcome: Progressing  Goal: Performs ADL's with improved pain control throughout shift  Outcome: Progressing  Goal: Participates in PT with improved pain control throughout the shift  Outcome: Progressing  Goal: Free from opioid side effects throughout the shift  Outcome: Progressing  Goal: Free from acute confusion related to pain meds throughout the shift  Outcome: Progressing   The patient's goals for the shift include      The clinical goals for the shift include Patient Will remain fall free this shift

## 2024-09-10 NOTE — PROGRESS NOTES
"  Department of Plastic and Reconstructive Surgery  Daily Progress Note    Patient Name: Gal Cramer  MRN: [id number]  Date:  09/10/24     Subjective  Seen with vac change, patient denies excess pain around wound. Feels well this afternoon.     Overnight Events  No acute events overnight.     Objective    Vital Signs  /80 (BP Location: Left arm, Patient Position: Lying)   Pulse 69   Temp 36.3 °C (97.3 °F) (Temporal)   Resp 15   Ht 1.753 m (5' 9\")   Wt 75.3 kg (166 lb)   SpO2 99%   BMI 24.51 kg/m²      Physical Exam  Constitutional:       General: He is not in acute distress.  HENT:      Head: Normocephalic and atraumatic.      Mouth/Throat:      Mouth: Mucous membranes are moist.      Pharynx: No oropharyngeal exudate.   Eyes:      General: No scleral icterus.     Pupils: Pupils are equal, round, and reactive to light.   Cardiovascular:      Rate and Rhythm: Normal rate and regular rhythm.      Pulses: Normal pulses.   Pulmonary:      Effort: Pulmonary effort is normal. No respiratory distress.   Abdominal:      General: Abdomen is flat.      Palpations: Abdomen is soft.   Musculoskeletal:      Cervical back: Normal range of motion and neck supple.      Comments: Right Knee with medial wound measuring 5.5x5x1.4 cm.     Clean granulation tissue at base without exposed cartidge or bone.     Minimal undermining at wound edges.    Skin:     General: Skin is warm and dry.   Neurological:      Mental Status: He is alert.          Diagnostics   Results for orders placed or performed during the hospital encounter of 09/06/24 (from the past 24 hour(s))   Magnesium   Result Value Ref Range    Magnesium 2.02 1.60 - 2.40 mg/dL   CBC and Auto Differential   Result Value Ref Range    WBC 4.2 (L) 4.4 - 11.3 x10*3/uL    nRBC 0.0 0.0 - 0.0 /100 WBCs    RBC 2.82 (L) 4.50 - 5.90 x10*6/uL    Hemoglobin 9.9 (L) 13.5 - 17.5 g/dL    Hematocrit 28.3 (L) 41.0 - 52.0 %     80 - 100 fL    MCH 35.1 (H) 26.0 - 34.0 pg    " MCHC 35.0 32.0 - 36.0 g/dL    RDW 12.7 11.5 - 14.5 %    Platelets 202 150 - 450 x10*3/uL    Neutrophils % 57.0 40.0 - 80.0 %    Immature Granulocytes %, Automated 0.5 0.0 - 0.9 %    Lymphocytes % 26.0 13.0 - 44.0 %    Monocytes % 9.2 2.0 - 10.0 %    Eosinophils % 6.3 0.0 - 6.0 %    Basophils % 1.0 0.0 - 2.0 %    Neutrophils Absolute 2.37 1.60 - 5.50 x10*3/uL    Immature Granulocytes Absolute, Automated 0.02 0.00 - 0.50 x10*3/uL    Lymphocytes Absolute 1.08 0.80 - 3.00 x10*3/uL    Monocytes Absolute 0.38 0.05 - 0.80 x10*3/uL    Eosinophils Absolute 0.26 0.00 - 0.40 x10*3/uL    Basophils Absolute 0.04 0.00 - 0.10 x10*3/uL   Renal function panel   Result Value Ref Range    Glucose 87 74 - 99 mg/dL    Sodium 137 136 - 145 mmol/L    Potassium 4.9 3.5 - 5.3 mmol/L    Chloride 102 98 - 107 mmol/L    Bicarbonate 28 21 - 32 mmol/L    Anion Gap 12 10 - 20 mmol/L    Urea Nitrogen 28 (H) 6 - 23 mg/dL    Creatinine 1.59 (H) 0.50 - 1.30 mg/dL    eGFR 42 (L) >60 mL/min/1.73m*2    Calcium 9.4 8.6 - 10.6 mg/dL    Phosphorus 3.5 2.5 - 4.9 mg/dL    Albumin 3.7 3.4 - 5.0 g/dL     Lower extremity venous duplex bilateral    Result Date: 9/8/2024  Interpreted By:  Kenny Reid and Meyers Emily STUDY: [facility] US LOWER EXTREMITY VENOUS DUPLEX BILATERAL;  9/8/2024 10:00 am   INDICATION: Signs/Symptoms:concern for RLE DVT.   ,I82.4Z9 Acute embolism and thrombosis of unspecified deep veins of unspecified distal lower extremity (Multi),R60.1 Generalized edema   COMPARISON: None.   ACCESSION NUMBER(S): [id number]   ORDERING CLINICIAN: EVER DEV   TECHNIQUE: Vascular ultrasound of the bilateral lower extremities was performed. Real-time compression views as well as Gray scale, color Doppler and spectral Doppler waveform analysis was performed.   FINDINGS: Evaluation of the visualized portions of the bilateral common femoral vein, proximal, mid, and distal femoral vein, and popliteal vein were performed.  Evaluation of the  visualized portions of the  posterior tibial and peroneal veins were also performed.   Limitations:  None.   The evaluated veins demonstrate normal compressibility. There is intact venous flow demonstrating normal respiratory variability and normal augmentation of flow with calf compression. Therefore, there is no ultrasonographic evidence for deep vein thrombosis within the evaluated veins.   Wound VAC extensive edema noted about the right leg.       No sonographic evidence for deep vein thrombosis within the evaluated veins of the bilateral lower extremities.   I personally reviewed the images/study, and I agree with the findings as stated above. This study was interpreted at [place], [place].   MACRO: None   Signed by: Kenny Roque 9/8/2024 12:03 PM Dictation workstation:   [id number]    XR knee right 4+ views    Result Date: 9/6/2024  Interpreted By:  Rashaad Tinsley and Beyersdorf Conner STUDY: XR KNEE RIGHT 4+ VIEWS; ;  9/6/2024 10:30 pm   INDICATION: Signs/Symptoms:Pain, increased swelling.     COMPARISON: Knee x-ray 08/17/2024   ACCESSION NUMBER(S): [id number]   ORDERING CLINICIAN: EVER MÉNDEZ   FINDINGS: Four views of the right knee are provided.   No acute fracture or malalignment. There is significant soft tissue edema over the anteromedial aspect of the knee. There is no significant knee joint effusion. No radiopaque retained foreign body. Vascular calcifications noted.       1. No acute fracture. Significant soft tissue swelling over the anteromedial aspect of the knee.     I personally reviewed the image(s)/study and resident interpretation. I agree with the findings as stated by resident Denny Castellanos. Data analyzed and images interpreted at [place], [place].   MACRO: None   Signed by: Rashaad Tinsley 9/6/2024 10:50 PM Dictation workstation:   [id number]    XR chest 1 view    Result Date:  9/6/2024  Interpreted By:  Rashaad Tinsley and Beyersdorf Conner STUDY: XR CHEST 1 VIEW;  9/6/2024 10:30 pm   INDICATION: Signs/Symptoms:pre op eval.   COMPARISON: Single-view chest 08/18/2024   ACCESSION NUMBER(S): [id number]   ORDERING CLINICIAN: LINDSAY URBINA   FINDINGS: AP radiograph of the chest was provided.     CARDIOMEDIASTINAL SILHOUETTE: Cardiomediastinal silhouette is normal in size and configuration.   LUNGS: No focal consolidation, pleural effusion, or pneumothorax. Stable mild interstitial prominence, likely chronic.   ABDOMEN: No remarkable upper abdominal findings.   BONES: Degenerative changes of the spine and AC joints.       1.  No acute cardiopulmonary process.   I personally reviewed the image(s)/study and resident interpretation. I agree with the findings as stated by resident Denny Castellanos. Data analyzed and images interpreted at [facility], [place], [place].   MACRO: None   Signed by: Rashaad Tinsley 9/6/2024 10:47 PM Dictation workstation:   [id number]    XR chest 1 view    Result Date: 8/18/2024  Interpreted By:  Andrew Cope, STUDY: XR CHEST 1 VIEW;  8/18/2024 5:57 pm   INDICATION: Signs/Symptoms:preop eval.   COMPARISON: None.   ACCESSION NUMBER(S): [id number]   ORDERING CLINICIAN: YAEL VALLADARES   FINDINGS: AP radiograph of the chest was provided.       CARDIOMEDIASTINAL SILHOUETTE: Cardiomediastinal silhouette is normal in size and configuration.   LUNGS: Lungs are clear.   ABDOMEN: No remarkable upper abdominal findings.   BONES: No acute osseous changes.       1.  No evidence of acute cardiopulmonary process.       MACRO: None   Signed by: Andrew Cope 8/18/2024 7:20 PM Dictation workstation:   [id number]    XR lumbar spine 2-3 views    Result Date: 8/18/2024  Interpreted By:  Araceli Wisdom and Kamau Nyokabi STUDY: XR LUMBAR SPINE 2-3 VIEWS; ;  8/18/2024 10:22 am   INDICATION: Signs/Symptoms:left low back pain after fall.   COMPARISON:  Abdominal radiograph 03/13/2012 MRI of the lumbar spine 12/28/2013   ACCESSION NUMBER(S): [id number]   ORDERING CLINICIAN: SAVANNAH CARLIN   FINDINGS: Three views of the lumbar spine.   Mild dextrocurvature of the lumbar spine.   No traumatic subluxation. No acute fractures.   Mild superior endplate concavity of L2 likely representing chronic compression fracture. Rest of the vertebral body heights are maintained. Severe degenerative changes from L3-4 to L5-S1.       Likely chronic mild L2 vertebral body compression fracture.   Severe degenerative changes from L3-4 to L5-S1.   Mild dextroscoliosis.   I personally reviewed the images/study and I agree with Dr. John Paul Dorman findings as stated. This study was interpreted at [place], [place]   MACRO: None   Signed by: Araceli Wisdom 8/18/2024 1:43 PM Dictation workstation:   [id number]    ECG 12 lead    Result Date: 8/17/2024  Normal sinus rhythm Right bundle branch block Abnormal ECG When compared with ECG of 13-MAR-2012 08:37, Right bundle branch block is now Present See ED provider note for full interpretation and clinical correlation Confirmed by David Marmolejo ([id number]) on 8/17/2024 10:52:07 PM    ECG 12 lead    Result Date: 8/17/2024  Normal sinus rhythm Nonspecific intraventricular block Abnormal ECG When compared with ECG of 17-AUG-2024 17:31, Nonspecific intraventricular block has replaced Right bundle branch block See ED provider note for full interpretation and clinical correlation Confirmed by David Marmolejo ([id number]) on 8/17/2024 10:52:02 PM    CT knee right wo IV contrast    Result Date: 8/17/2024  STUDY: CT Right Knee; Completed Time:  8/17/2024 at 7:09 PM INDICATION: Right knee pain and swelling; minimal XR findings. COMPARISON: XR right knee, XR right femur, XR right hip and XR right tibia and fibula 8/17/2024. ACCESSION NUMBER(S): [id number] ORDERING CLINICIAN: SAVANNAH CARLIN TECHNIQUE:  Thin  section axial images were obtained through the right knee without intravenous contrast.  Orthogonal reconstructed images were obtained and reviewed.  Automated mA/kV exposure control was utilized and patient examination was performed in strict accordance with principles of ALARA. FINDINGS: There is a 3.6 x 8.9 x 11.2 cm soft tissue mass anteriorly in the subcutaneous fat.  It is heterogeneous in attenuation and is consistent with a hematoma.    No acute osseous abnormalities. Soft tissue mass in the anterior subcutaneous fat consistent with a hematoma. Signed by Jose Alejandro Cross MD    CT head wo IV contrast    Result Date: 8/17/2024  Interpreted By:  Miriam Paz, STUDY: CT HEAD WO IV CONTRAST; CT CERVICAL SPINE WO IV CONTRAST; CT FACIAL BONES WO IV CONTRAST;  8/17/2024 7:07 pm   INDICATION: Signs/Symptoms:fall struck head, no AC; Signs/Symptoms:fall; Signs/Symptoms:fall, right chibn pain.   COMPARISON: None.   ACCESSION NUMBER(S): [id number]; [id number]; [id number]   ORDERING CLINICIAN: SAVANNAH CARLIN   TECHNIQUE: Noncontrast axial CT scan of head was performed, with coronal and sagittal reformats provided. The images were reviewed in bone, brain, blood and soft tissue windows.   Thin cut axial CT images through the facial bones were obtained and reconstructed in the coronal and sagittal plane. 3D reconstruction of the facial bones was created on a dedicated workstation and provided for interpretation.   Axial CT images of the cervical spine are obtained. Axial, coronal and sagittal reconstructions are provided for review.   FINDINGS: CT HEAD:   No hyperdense intracranial hemorrhage is identified. There is no mass effect or midline shift.   Gray-white differentiation is intact, without evidence of CT apparent transcortical infarct, although patchy attenuation changes are present in the periventricular and subcortical white matter of bilateral cerebral hemispheres, nonspecific findings favored to  represent sequela of microvascular disease.   No abnormal ventricular dilatation is present. Basal cisterns are patent. No extra-axial fluid collections are identified.   Scalp soft tissues do not demonstrate any acute abnormality. Calvarium is unremarkable without evidence of skull fracture.   Mastoid air cells and middle ear cavities are clear.   CT FACIAL BONES:   Bony orbits are intact, without evidence of fracture. Periorbital soft tissues and intraorbital structures are symmetric in appearance without evidence of acute trauma. Patient is status post cataract extraction bilaterally.   No acute facial bone fracture is identified. Nasal bones are unremarkable in appearance.   Paranasal sinuses are well aerated without evidence of air-fluid levels.   Some soft tissue swelling overlies the chin, without evidence of underlying osseous injury. No associated fluid collection or soft tissue gas is present.   Although evaluation of the oral cavity is degraded by beam hardening artifact no definite evidence of acute trauma to the oral cavity is present. Temporomandibular joints are intact, with asymmetric degenerative changes present in the right.   Large periapical lucency surrounds the roots of the 2nd right maxillary molar.   Parotid and submandibular glands are symmetric in appearance and otherwise unremarkable.   CT C-SPINE:   There is straightening of normal lordotic curvature of the cervical spine, without evidence of significant spondylolisthesis.   Cervical vertebral body heights are preserved without evidence of compression fractures, although some insufficiency endplate changes with smaller Schmorl's nodes are present at several levels. There is no evidence of acute trauma to the posterior elements of the cervical spine.   Craniocervical junction is intact, although degenerative changes at the atlantoaxial articulation with hypertrophic pannus extending into the anterior epidural space of C1 contributing to  mild spinal canal narrowing at this level with the effacement of the anterior subarachnoid space.   Facet joints are preserved without evidence of traumatic subluxation or perching, although multilevel degenerate facet osteoarthropathy is evident, most pronounced at C2-C3 and C3-C4 on the left.   Multilevel intervertebral disc height loss is present, moderate to severe at C4-C5 and C5-C6 and mild at other levels.   No high-grade stenosis is identified in the cervical spine, although mild spinal canal narrowing is suspected at C3-C4, C4-C5 and C5-C6 due to disc osteophyte complexes and ligamentum flavum thickening.   Mild spinal canal narrowing is present at C3-C4 bilaterally and C4-C5 on the left due to uncovertebral and facet joint hypertrophy.   Prevertebral and paraspinal soft tissues do not demonstrate any acute abnormalities. Included lung apices are clear.       CT HEAD: 1. No evidence of hemorrhage, skull fracture, or other acute intracranial trauma/abnormality. 2. Patchy attenuation changes are present in the periventricular and subcortical white matter of bilateral cerebral hemispheres, nonspecific findings favored to represent sequela of microvascular disease.   CT FACIAL BONES: 1. Soft tissue swelling overlies the chin, likely posttraumatic without absence of the underlying osseous injury. No facial or orbital bone fracture is present. 2. Large periapical lucency surrounds the roots of the 2nd right mandibular molar, correlate with dental exam.   CT C-SPINE: 1. No evidence of acute trauma to the cervical spine. 2. Multilevel degenerative changes of the cervical spine are present, with intervertebral disc height loss, facet osteoarthropathy, and mild spinal canal and neural foraminal stenosis at several levels due to disc osteophyte complexes, ligamentum flavum thickening and hypertrophic facet changes.   MACRO: None   Signed by: Miriam Paz 8/17/2024 7:28 PM Dictation workstation:    [id number]    CT maxillofacial bones wo IV contrast    Result Date: 8/17/2024  Interpreted By:  Miriam Paz, STUDY: CT HEAD WO IV CONTRAST; CT CERVICAL SPINE WO IV CONTRAST; CT FACIAL BONES WO IV CONTRAST;  8/17/2024 7:07 pm   INDICATION: Signs/Symptoms:fall struck head, no AC; Signs/Symptoms:fall; Signs/Symptoms:fall, right chibn pain.   COMPARISON: None.   ACCESSION NUMBER(S): [id number]; [id number]; [id number]   ORDERING CLINICIAN: SAVANNAH CARLIN   TECHNIQUE: Noncontrast axial CT scan of head was performed, with coronal and sagittal reformats provided. The images were reviewed in bone, brain, blood and soft tissue windows.   Thin cut axial CT images through the facial bones were obtained and reconstructed in the coronal and sagittal plane. 3D reconstruction of the facial bones was created on a dedicated workstation and provided for interpretation.   Axial CT images of the cervical spine are obtained. Axial, coronal and sagittal reconstructions are provided for review.   FINDINGS: CT HEAD:   No hyperdense intracranial hemorrhage is identified. There is no mass effect or midline shift.   Gray-white differentiation is intact, without evidence of CT apparent transcortical infarct, although patchy attenuation changes are present in the periventricular and subcortical white matter of bilateral cerebral hemispheres, nonspecific findings favored to represent sequela of microvascular disease.   No abnormal ventricular dilatation is present. Basal cisterns are patent. No extra-axial fluid collections are identified.   Scalp soft tissues do not demonstrate any acute abnormality. Calvarium is unremarkable without evidence of skull fracture.   Mastoid air cells and middle ear cavities are clear.   CT FACIAL BONES:   Bony orbits are intact, without evidence of fracture. Periorbital soft tissues and intraorbital structures are symmetric in appearance without evidence of acute trauma. Patient is status post  cataract extraction bilaterally.   No acute facial bone fracture is identified. Nasal bones are unremarkable in appearance.   Paranasal sinuses are well aerated without evidence of air-fluid levels.   Some soft tissue swelling overlies the chin, without evidence of underlying osseous injury. No associated fluid collection or soft tissue gas is present.   Although evaluation of the oral cavity is degraded by beam hardening artifact no definite evidence of acute trauma to the oral cavity is present. Temporomandibular joints are intact, with asymmetric degenerative changes present in the right.   Large periapical lucency surrounds the roots of the 2nd right maxillary molar.   Parotid and submandibular glands are symmetric in appearance and otherwise unremarkable.   CT C-SPINE:   There is straightening of normal lordotic curvature of the cervical spine, without evidence of significant spondylolisthesis.   Cervical vertebral body heights are preserved without evidence of compression fractures, although some insufficiency endplate changes with smaller Schmorl's nodes are present at several levels. There is no evidence of acute trauma to the posterior elements of the cervical spine.   Craniocervical junction is intact, although degenerative changes at the atlantoaxial articulation with hypertrophic pannus extending into the anterior epidural space of C1 contributing to mild spinal canal narrowing at this level with the effacement of the anterior subarachnoid space.   Facet joints are preserved without evidence of traumatic subluxation or perching, although multilevel degenerate facet osteoarthropathy is evident, most pronounced at C2-C3 and C3-C4 on the left.   Multilevel intervertebral disc height loss is present, moderate to severe at C4-C5 and C5-C6 and mild at other levels.   No high-grade stenosis is identified in the cervical spine, although mild spinal canal narrowing is suspected at C3-C4, C4-C5 and C5-C6 due to  disc osteophyte complexes and ligamentum flavum thickening.   Mild spinal canal narrowing is present at C3-C4 bilaterally and C4-C5 on the left due to uncovertebral and facet joint hypertrophy.   Prevertebral and paraspinal soft tissues do not demonstrate any acute abnormalities. Included lung apices are clear.       CT HEAD: 1. No evidence of hemorrhage, skull fracture, or other acute intracranial trauma/abnormality. 2. Patchy attenuation changes are present in the periventricular and subcortical white matter of bilateral cerebral hemispheres, nonspecific findings favored to represent sequela of microvascular disease.   CT FACIAL BONES: 1. Soft tissue swelling overlies the chin, likely posttraumatic without absence of the underlying osseous injury. No facial or orbital bone fracture is present. 2. Large periapical lucency surrounds the roots of the 2nd right mandibular molar, correlate with dental exam.   CT C-SPINE: 1. No evidence of acute trauma to the cervical spine. 2. Multilevel degenerative changes of the cervical spine are present, with intervertebral disc height loss, facet osteoarthropathy, and mild spinal canal and neural foraminal stenosis at several levels due to disc osteophyte complexes, ligamentum flavum thickening and hypertrophic facet changes.   MACRO: None   Signed by: Miriam Paz 8/17/2024 7:28 PM Dictation workstation:   [id number]    CT cervical spine wo IV contrast    Result Date: 8/17/2024  Interpreted By:  Miriam Paz, STUDY: CT HEAD WO IV CONTRAST; CT CERVICAL SPINE WO IV CONTRAST; CT FACIAL BONES WO IV CONTRAST;  8/17/2024 7:07 pm   INDICATION: Signs/Symptoms:fall struck head, no AC; Signs/Symptoms:fall; Signs/Symptoms:fall, right chibn pain.   COMPARISON: None.   ACCESSION NUMBER(S): [id number]; [id number]; [id number]   ORDERING CLINICIAN: SAVANNAH CARLIN   TECHNIQUE: Noncontrast axial CT scan of head was performed, with coronal and sagittal reformats provided.  The images were reviewed in bone, brain, blood and soft tissue windows.   Thin cut axial CT images through the facial bones were obtained and reconstructed in the coronal and sagittal plane. 3D reconstruction of the facial bones was created on a dedicated workstation and provided for interpretation.   Axial CT images of the cervical spine are obtained. Axial, coronal and sagittal reconstructions are provided for review.   FINDINGS: CT HEAD:   No hyperdense intracranial hemorrhage is identified. There is no mass effect or midline shift.   Gray-white differentiation is intact, without evidence of CT apparent transcortical infarct, although patchy attenuation changes are present in the periventricular and subcortical white matter of bilateral cerebral hemispheres, nonspecific findings favored to represent sequela of microvascular disease.   No abnormal ventricular dilatation is present. Basal cisterns are patent. No extra-axial fluid collections are identified.   Scalp soft tissues do not demonstrate any acute abnormality. Calvarium is unremarkable without evidence of skull fracture.   Mastoid air cells and middle ear cavities are clear.   CT FACIAL BONES:   Bony orbits are intact, without evidence of fracture. Periorbital soft tissues and intraorbital structures are symmetric in appearance without evidence of acute trauma. Patient is status post cataract extraction bilaterally.   No acute facial bone fracture is identified. Nasal bones are unremarkable in appearance.   Paranasal sinuses are well aerated without evidence of air-fluid levels.   Some soft tissue swelling overlies the chin, without evidence of underlying osseous injury. No associated fluid collection or soft tissue gas is present.   Although evaluation of the oral cavity is degraded by beam hardening artifact no definite evidence of acute trauma to the oral cavity is present. Temporomandibular joints are intact, with asymmetric degenerative changes  present in the right.   Large periapical lucency surrounds the roots of the 2nd right maxillary molar.   Parotid and submandibular glands are symmetric in appearance and otherwise unremarkable.   CT C-SPINE:   There is straightening of normal lordotic curvature of the cervical spine, without evidence of significant spondylolisthesis.   Cervical vertebral body heights are preserved without evidence of compression fractures, although some insufficiency endplate changes with smaller Schmorl's nodes are present at several levels. There is no evidence of acute trauma to the posterior elements of the cervical spine.   Craniocervical junction is intact, although degenerative changes at the atlantoaxial articulation with hypertrophic pannus extending into the anterior epidural space of C1 contributing to mild spinal canal narrowing at this level with the effacement of the anterior subarachnoid space.   Facet joints are preserved without evidence of traumatic subluxation or perching, although multilevel degenerate facet osteoarthropathy is evident, most pronounced at C2-C3 and C3-C4 on the left.   Multilevel intervertebral disc height loss is present, moderate to severe at C4-C5 and C5-C6 and mild at other levels.   No high-grade stenosis is identified in the cervical spine, although mild spinal canal narrowing is suspected at C3-C4, C4-C5 and C5-C6 due to disc osteophyte complexes and ligamentum flavum thickening.   Mild spinal canal narrowing is present at C3-C4 bilaterally and C4-C5 on the left due to uncovertebral and facet joint hypertrophy.   Prevertebral and paraspinal soft tissues do not demonstrate any acute abnormalities. Included lung apices are clear.       CT HEAD: 1. No evidence of hemorrhage, skull fracture, or other acute intracranial trauma/abnormality. 2. Patchy attenuation changes are present in the periventricular and subcortical white matter of bilateral cerebral hemispheres, nonspecific findings favored  to represent sequela of microvascular disease.   CT FACIAL BONES: 1. Soft tissue swelling overlies the chin, likely posttraumatic without absence of the underlying osseous injury. No facial or orbital bone fracture is present. 2. Large periapical lucency surrounds the roots of the 2nd right mandibular molar, correlate with dental exam.   CT C-SPINE: 1. No evidence of acute trauma to the cervical spine. 2. Multilevel degenerative changes of the cervical spine are present, with intervertebral disc height loss, facet osteoarthropathy, and mild spinal canal and neural foraminal stenosis at several levels due to disc osteophyte complexes, ligamentum flavum thickening and hypertrophic facet changes.   MACRO: None   Signed by: Miriam Paz 8/17/2024 7:28 PM Dictation workstation:   [id number]    XR hip right with pelvis when performed 2 or 3 views    Result Date: 8/17/2024  STUDY: Femur Radiographs, Pelvis and Right Hip Radiographs;  08/17/2024 6:18 PM INDICATION: Fall/trauma. COMPARISON: None available. ACCESSION NUMBER(S): [id number], [id number] ORDERING CLINICIAN: SAVANNAH CARLIN TECHNIQUE:  Two views (five images) of the right femur.  One view(s) of the pelvis.  Two view(s) of the right hip hip. FINDINGS:  Right Femur: There is no displaced fracture.  The alignment is anatomic.  There is marked soft tissue swelling anteriorly over the knee. PELVIS: The pelvic ring is intact.  There is no acute fracture.  There are degenerative changes in the included the lower lumbar spine.  The sacroiliac joints are patent.  There is mild narrowing of the left hip joint Right Hip: There is no displaced fracture.  The alignment is anatomic.  There is mild joint space narrowing with acetabular spurring.  Nonspecific soft tissue reticulations.    No acute bony abnormalities on x-ray examination of the pelvis, right femur and right hip. Marked anterior soft tissue swelling over the right knee. Signed by Vonda Sanchez  DO    XR femur right 2+ views    Result Date: 8/17/2024  STUDY: Femur Radiographs, Pelvis and Right Hip Radiographs;  08/17/2024 6:18 PM INDICATION: Fall/trauma. COMPARISON: None available. ACCESSION NUMBER(S): [id number], [id number] ORDERING CLINICIAN: SAVANNAH CARLIN TECHNIQUE:  Two views (five images) of the right femur.  One view(s) of the pelvis.  Two view(s) of the right hip hip. FINDINGS:  Right Femur: There is no displaced fracture.  The alignment is anatomic.  There is marked soft tissue swelling anteriorly over the knee. PELVIS: The pelvic ring is intact.  There is no acute fracture.  There are degenerative changes in the included the lower lumbar spine.  The sacroiliac joints are patent.  There is mild narrowing of the left hip joint Right Hip: There is no displaced fracture.  The alignment is anatomic.  There is mild joint space narrowing with acetabular spurring.  Nonspecific soft tissue reticulations.    No acute bony abnormalities on x-ray examination of the pelvis, right femur and right hip. Marked anterior soft tissue swelling over the right knee. Signed by Vonda Sanchez DO    XR tibia fibula right 2 views    Result Date: 8/17/2024  STUDY: Knee Radiographs; 08/17/2024 6:17 PM INDICATION: Fall with right knee and leg pain. COMPARISON: None. ACCESSION NUMBER(S): [id number], [id number] ORDERING CLINICIAN: SAVANNAH CARLIN TECHNIQUE:  Right Knee:  Three view(s) of the right knee. Right Tibia/Fibula:  Five view(s) of the right tibia and fibula. FINDINGS:  Right Knee:  There is no displaced fracture.  The alignment is anatomic.  No soft tissue abnormality is seen.  There is no joint effusion. There is prominent soft tissue swelling anterior to the patella. Right Tibia/Fibula:  There is no displaced fracture.  The alignment is anatomic.  No soft tissue abnormality is seen.    Prominent soft tissue swelling anterior to the patella. No fracture or dislocation. Signed by Andre Chanel MD    XR knee  right 1-2 views    Result Date: 8/17/2024  STUDY: Knee Radiographs; 08/17/2024 6:17 PM INDICATION: Fall with right knee and leg pain. COMPARISON: None. ACCESSION NUMBER(S): [id number], [id number] ORDERING CLINICIAN: SAVANNAH CARLIN TECHNIQUE:  Right Knee:  Three view(s) of the right knee. Right Tibia/Fibula:  Five view(s) of the right tibia and fibula. FINDINGS:  Right Knee:  There is no displaced fracture.  The alignment is anatomic.  No soft tissue abnormality is seen.  There is no joint effusion. There is prominent soft tissue swelling anterior to the patella. Right Tibia/Fibula:  There is no displaced fracture.  The alignment is anatomic.  No soft tissue abnormality is seen.    Prominent soft tissue swelling anterior to the patella. No fracture or dislocation. Signed by Andre Chanel MD      Current Medications  Scheduled medications  aspirin, 81 mg, oral, BID  calcium carbonate-vitamin D3, 2 tablet, oral, Daily  donepezil, 10 mg, oral, Daily  finasteride, 5 mg, oral, Daily  [Held by provider] lisinopril, 20 mg, oral, Daily  memantine, 10 mg, oral, Daily  mirtazapine, 15 mg, oral, Nightly  pantoprazole, 40 mg, oral, Daily before breakfast  polyethylene glycol, 17 g, oral, Daily  pravastatin, 10 mg, oral, Every Mon/Wed/Fri      Continuous medications     PRN medications  PRN medications: acetaminophen, oxyCODONE, oxyCODONE     Assessment  Gal Cramer is a 84 y.o. male with a past medical history of dementia, HTN, HLD, GERD, peripheral neuropathy, and BPH (self-cath 3-4x every day)  who presented to the ED 9/6 from the outpatient orthopedic office given c/f recurrent right knee hematoma. Patient was admitted to [facility] 08/17-08/21 following unprovoked mechanical fall down 1 step resulting in R knee hematoma and was taken for I&D of hematoma on 8/19 which has been c/b hematoma recurrence with overlying wound/tissue necrosis. RTOR with orthopedic surgery on 9/7 for debridement of skin and soft tissue of  knee down to articular cartilage. Plastic surgery consulted for wound assessment and possible assistance with eventual wound coverage/closure.      Wound assessed today with vac change. Base of healthy granulation tissue that will be amenable to definitive vac therapy for tissue coverage and wound closure.      Plan/Recommendations  - Recommend continued wound vac therapy and allow healing by secondary intention  - WB restrictions per orthopedic surgery recommendations  - Appreciate remaining supportive care per primary service  - Follow up outpatient with Plastic Surgery 2 weeks post discharge to evaluate wound and need for any coverage.         Patient and plan discussed with Dr. Jamil MD   Plastic and Reconstructive Surgery   [place]  Pager #[phone]  Team phone: [phone]

## 2024-09-11 LAB
ALBUMIN SERPL BCP-MCNC: 3.6 G/DL (ref 3.4–5)
ANION GAP SERPL CALC-SCNC: 12 MMOL/L (ref 10–20)
BACTERIA BLD CULT: NORMAL
BACTERIA BLD CULT: NORMAL
BASOPHILS # BLD AUTO: 0.06 X10*3/UL (ref 0–0.1)
BASOPHILS NFR BLD AUTO: 1.1 %
BUN SERPL-MCNC: 31 MG/DL (ref 6–23)
CALCIUM SERPL-MCNC: 9.1 MG/DL (ref 8.6–10.6)
CHLORIDE SERPL-SCNC: 102 MMOL/L (ref 98–107)
CO2 SERPL-SCNC: 28 MMOL/L (ref 21–32)
CREAT SERPL-MCNC: 1.68 MG/DL (ref 0.5–1.3)
EGFRCR SERPLBLD CKD-EPI 2021: 40 ML/MIN/1.73M*2
EOSINOPHIL # BLD AUTO: 0.29 X10*3/UL (ref 0–0.4)
EOSINOPHIL NFR BLD AUTO: 5.6 %
ERYTHROCYTE [DISTWIDTH] IN BLOOD BY AUTOMATED COUNT: 12.6 % (ref 11.5–14.5)
GLUCOSE SERPL-MCNC: 96 MG/DL (ref 74–99)
HCT VFR BLD AUTO: 26 % (ref 41–52)
HGB BLD-MCNC: 9.6 G/DL (ref 13.5–17.5)
IMM GRANULOCYTES # BLD AUTO: 0.03 X10*3/UL (ref 0–0.5)
IMM GRANULOCYTES NFR BLD AUTO: 0.6 % (ref 0–0.9)
LYMPHOCYTES # BLD AUTO: 1.22 X10*3/UL (ref 0.8–3)
LYMPHOCYTES NFR BLD AUTO: 23.4 %
MAGNESIUM SERPL-MCNC: 1.97 MG/DL (ref 1.6–2.4)
MCH RBC QN AUTO: 36.4 PG (ref 26–34)
MCHC RBC AUTO-ENTMCNC: 36.9 G/DL (ref 32–36)
MCV RBC AUTO: 99 FL (ref 80–100)
MONOCYTES # BLD AUTO: 0.53 X10*3/UL (ref 0.05–0.8)
MONOCYTES NFR BLD AUTO: 10.2 %
NEUTROPHILS # BLD AUTO: 3.09 X10*3/UL (ref 1.6–5.5)
NEUTROPHILS NFR BLD AUTO: 59.1 %
NRBC BLD-RTO: 0 /100 WBCS (ref 0–0)
PHOSPHATE SERPL-MCNC: 3.3 MG/DL (ref 2.5–4.9)
PLATELET # BLD AUTO: 185 X10*3/UL (ref 150–450)
POTASSIUM SERPL-SCNC: 4.5 MMOL/L (ref 3.5–5.3)
RBC # BLD AUTO: 2.64 X10*6/UL (ref 4.5–5.9)
SARS-COV-2 RNA RESP QL NAA+PROBE: NOT DETECTED
SODIUM SERPL-SCNC: 137 MMOL/L (ref 136–145)
WBC # BLD AUTO: 5.2 X10*3/UL (ref 4.4–11.3)

## 2024-09-11 PROCEDURE — 2500000001 HC RX 250 WO HCPCS SELF ADMINISTERED DRUGS (ALT 637 FOR MEDICARE OP)

## 2024-09-11 PROCEDURE — 1100000001 HC PRIVATE ROOM DAILY

## 2024-09-11 PROCEDURE — 97530 THERAPEUTIC ACTIVITIES: CPT | Mod: GP,CQ

## 2024-09-11 PROCEDURE — 97116 GAIT TRAINING THERAPY: CPT | Mod: GP,CQ

## 2024-09-11 PROCEDURE — 36415 COLL VENOUS BLD VENIPUNCTURE: CPT | Mod: PARLAB | Performed by: NUTRITIONIST

## 2024-09-11 PROCEDURE — 85025 COMPLETE CBC W/AUTO DIFF WBC: CPT | Mod: PARLAB | Performed by: NUTRITIONIST

## 2024-09-11 PROCEDURE — 99232 SBSQ HOSP IP/OBS MODERATE 35: CPT

## 2024-09-11 PROCEDURE — 2500000004 HC RX 250 GENERAL PHARMACY W/ HCPCS (ALT 636 FOR OP/ED)

## 2024-09-11 PROCEDURE — 83735 ASSAY OF MAGNESIUM: CPT | Mod: PARLAB

## 2024-09-11 PROCEDURE — 80069 RENAL FUNCTION PANEL: CPT | Mod: PARLAB | Performed by: NUTRITIONIST

## 2024-09-11 PROCEDURE — 87635 SARS-COV-2 COVID-19 AMP PRB: CPT

## 2024-09-11 ASSESSMENT — COGNITIVE AND FUNCTIONAL STATUS - GENERAL
MOBILITY SCORE: 16
CLIMB 3 TO 5 STEPS WITH RAILING: A LOT
MOBILITY SCORE: 19
MOVING TO AND FROM BED TO CHAIR: A LITTLE
DAILY ACTIVITIY SCORE: 24
STANDING UP FROM CHAIR USING ARMS: A LITTLE
MOVING FROM LYING ON BACK TO SITTING ON SIDE OF FLAT BED WITH BEDRAILS: A LITTLE
MOBILITY SCORE: 19
STANDING UP FROM CHAIR USING ARMS: A LITTLE
CLIMB 3 TO 5 STEPS WITH RAILING: TOTAL
WALKING IN HOSPITAL ROOM: A LITTLE
CLIMB 3 TO 5 STEPS WITH RAILING: A LOT
WALKING IN HOSPITAL ROOM: A LITTLE
STANDING UP FROM CHAIR USING ARMS: A LITTLE
MOVING TO AND FROM BED TO CHAIR: A LITTLE
WALKING IN HOSPITAL ROOM: A LITTLE
MOVING TO AND FROM BED TO CHAIR: A LITTLE
STANDING UP FROM CHAIR USING ARMS: A LITTLE
MOBILITY SCORE: 19
TURNING FROM BACK TO SIDE WHILE IN FLAT BAD: A LITTLE
CLIMB 3 TO 5 STEPS WITH RAILING: A LOT
DAILY ACTIVITIY SCORE: 24
WALKING IN HOSPITAL ROOM: A LITTLE
MOVING TO AND FROM BED TO CHAIR: A LITTLE

## 2024-09-11 ASSESSMENT — PAIN SCALES - GENERAL
PAINLEVEL_OUTOF10: 0 - NO PAIN

## 2024-09-11 ASSESSMENT — PAIN - FUNCTIONAL ASSESSMENT
PAIN_FUNCTIONAL_ASSESSMENT: 0-10

## 2024-09-11 NOTE — PROGRESS NOTES
ARIEL sent covid results to [facility]. SANGEETHA confirmed they will order wound vac. ARIEL will continue to follow.     1228-MD confirmed patient is medically ready for discharge. ARIEL updated [facility] and  Wes.  states patient was not feeling good this morning and discharge tomorrow is preferred. ARIEL updated [facility] and MD. MD is agreeable to discharge tomorrow. Pending confirmation from [facility] on wound vac delivery. ARIEL requested stretcher transport for 9/12 at 0900. Pending Roundtrip confirmation. ARIEL will continue to follow.     1323-[facility] confirmed 10am stretcher transport for 9/12. ARIEL updated ROXANNE, MD, the floor and [facility]. ARIEL attempted to update  but no answer and unable to leave . ARIEL sent text msg with dc details. ARIEL will continue to follow for [facility] and 7000.     NATE Dennis

## 2024-09-11 NOTE — PROGRESS NOTES
Subjective   Patient seen and examined resting comfortably in bed this AM. Patient denies any fever, chills, night sweats, nausea, vomiting, diarrhea, chest pain, shortness of breath, headache, dizziness. R leg wrapped in ace bandage with wound vac in place. Later in the morning, patient became dizzy while working with physical therapy. BP sitting was 167/82, BP standing was 136/92. Patient states he has not been eating and drinking as much as usual.     Objective     Last Recorded Vitals  /77 (BP Location: Left arm, Patient Position: Sitting)   Pulse 78   Temp 36.1 °C (97 °F) (Temporal)   Resp 18   Wt 75.3 kg (166 lb)   SpO2 100%   Intake/Output last 3 Shifts:    Intake/Output Summary (Last 24 hours) at 9/11/2024 1354  Last data filed at 9/11/2024 0823  Gross per 24 hour   Intake --   Output 1750 ml   Net -1750 ml       Admission Weight  Weight: 75.3 kg (166 lb) (09/06/24 1346)    Daily Weight  09/06/24 : 75.3 kg (166 lb)    Image Results  Lower extremity venous duplex bilateral  Narrative: Interpreted By:  Kenny Reid and Meyers Emily   STUDY:  [facility] LOWER EXTREMITY VENOUS DUPLEX BILATERAL;  9/8/2024 10:00 am      INDICATION:  Signs/Symptoms:concern for RLE DVT.      ,I82.4Z9 Acute embolism and thrombosis of unspecified deep veins of  unspecified distal lower extremity (Multi),R60.1 Generalized edema      COMPARISON:  None.      ACCESSION NUMBER(S):  [id number]      ORDERING CLINICIAN:  EVER MÉNDEZ      TECHNIQUE:  Vascular ultrasound of the bilateral lower extremities was performed.  Real-time compression views as well as Gray scale, color Doppler and  spectral Doppler waveform analysis was performed.      FINDINGS:  Evaluation of the visualized portions of the bilateral common femoral  vein, proximal, mid, and distal femoral vein, and popliteal vein were  performed.  Evaluation of the visualized portions of the  posterior  tibial and peroneal veins were also performed.       Limitations:  None.      The evaluated veins demonstrate normal compressibility. There is  intact venous flow demonstrating normal respiratory variability and  normal augmentation of flow with calf compression. Therefore, there  is no ultrasonographic evidence for deep vein thrombosis within the  evaluated veins.      Wound VAC extensive edema noted about the right leg.      Impression: No sonographic evidence for deep vein thrombosis within the evaluated  veins of the bilateral lower extremities.      I personally reviewed the images/study, and I agree with the findings  as stated above. This study was interpreted at [facility], [place], [place].      MACRO:  None      Signed by: Kenny Roque 9/8/2024 12:03 PM  Dictation workstation:   [id number]      Physical Exam  Constitutional:       General: He is not in acute distress.     Appearance: Normal appearance.   HENT:      Head: Normocephalic and atraumatic.      Mouth/Throat:      Mouth: Mucous membranes are moist.   Eyes:      General: No scleral icterus.     Extraocular Movements: Extraocular movements intact.      Conjunctiva/sclera: Conjunctivae normal.      Pupils: Pupils are equal, round, and reactive to light.   Cardiovascular:      Rate and Rhythm: Normal rate and regular rhythm.      Heart sounds: No murmur heard.     No friction rub. No gallop.   Pulmonary:      Effort: Pulmonary effort is normal. No respiratory distress.      Breath sounds: Normal breath sounds. No stridor. No wheezing, rhonchi or rales.   Abdominal:      General: Abdomen is flat. Bowel sounds are normal. There is no distension.      Palpations: Abdomen is soft. There is no mass.      Tenderness: There is no abdominal tenderness. There is no guarding.   Musculoskeletal:      Right lower leg: No edema.      Left lower leg: No edema.      Comments: RLE wrapped in ace bandage and with wound vac attached   Skin:     General: Skin is warm  and dry.   Neurological:      Mental Status: He is alert.      Comments: Alert and oriented to person and place.         Relevant Results    Results for orders placed or performed during the hospital encounter of 09/06/24 (from the past 24 hour(s))   Sars-CoV-2 PCR   Result Value Ref Range    Coronavirus 2019, PCR Not Detected Not Detected   Magnesium   Result Value Ref Range    Magnesium 1.97 1.60 - 2.40 mg/dL   CBC and Auto Differential   Result Value Ref Range    WBC 5.2 4.4 - 11.3 x10*3/uL    nRBC 0.0 0.0 - 0.0 /100 WBCs    RBC 2.64 (L) 4.50 - 5.90 x10*6/uL    Hemoglobin 9.6 (L) 13.5 - 17.5 g/dL    Hematocrit 26.0 (L) 41.0 - 52.0 %    MCV 99 80 - 100 fL    MCH 36.4 (H) 26.0 - 34.0 pg    MCHC 36.9 (H) 32.0 - 36.0 g/dL    RDW 12.6 11.5 - 14.5 %    Platelets 185 150 - 450 x10*3/uL    Neutrophils % 59.1 40.0 - 80.0 %    Immature Granulocytes %, Automated 0.6 0.0 - 0.9 %    Lymphocytes % 23.4 13.0 - 44.0 %    Monocytes % 10.2 2.0 - 10.0 %    Eosinophils % 5.6 0.0 - 6.0 %    Basophils % 1.1 0.0 - 2.0 %    Neutrophils Absolute 3.09 1.60 - 5.50 x10*3/uL    Immature Granulocytes Absolute, Automated 0.03 0.00 - 0.50 x10*3/uL    Lymphocytes Absolute 1.22 0.80 - 3.00 x10*3/uL    Monocytes Absolute 0.53 0.05 - 0.80 x10*3/uL    Eosinophils Absolute 0.29 0.00 - 0.40 x10*3/uL    Basophils Absolute 0.06 0.00 - 0.10 x10*3/uL   Renal function panel   Result Value Ref Range    Glucose 96 74 - 99 mg/dL    Sodium 137 136 - 145 mmol/L    Potassium 4.5 3.5 - 5.3 mmol/L    Chloride 102 98 - 107 mmol/L    Bicarbonate 28 21 - 32 mmol/L    Anion Gap 12 10 - 20 mmol/L    Urea Nitrogen 31 (H) 6 - 23 mg/dL    Creatinine 1.68 (H) 0.50 - 1.30 mg/dL    eGFR 40 (L) >60 mL/min/1.73m*2    Calcium 9.1 8.6 - 10.6 mg/dL    Phosphorus 3.3 2.5 - 4.9 mg/dL    Albumin 3.6 3.4 - 5.0 g/dL        A/P: 84 y.o. male with a hx of dementia, HTN, HLD, GERD, CKD 3, peripheral neuropathy, and BPH (self-cath 3-4x every day)  who presented to the ED today from  outpatient orthopedic office, admitted to Medicine for elly-operative management. On initial assessment afebrile, HDS, with O2 sats appropriate on RA. Overall concerning for re accumulation of right knee hematoma. SIRS negative at this time, will hold off on Abx. Blood cultures collected, follow up. If positive plan to start IV Abx. Successful I&D 9/7 with collected intra-operative Cx. DVT ultrasound not concerning for DVT.  Pending plastic surgery recs for wound closure.     Updates 9/11:  -Ortho Recs: Weightbearing RLE as tolerated, pain: per primary, recommend multimodal (Tylenol, oxycodone, dilaudid), Perioperative ABx: Ancef x 24 hr completed 9/8, Drain: [place] set to suction   -DVT ppx continuing ASA 81 mg EC BID x 4 weeks per ortho, DVT ultrasound not concerning for DVT 9/8  - PT/OT following, recommendations for moderate intensity.   - Plan to discharge to [facility] likely tomorrow  -Plastic surgery follow up in 2 weeks  -Per discussion with patient and , patient has been losing weight lately. Shared decision making discussion with plan for ensures and magic cup trial  -500 mL of LR following orthostatic hypotension, will reassess  -will arrange for PCP, plastics follow up    #R Knee hematoma with overlying ulcer  :: Knee XR with no acute fracture. Noted to have significant soft tissue swelling over anteromedial aspect of the knee   :: ESR 23, CRP 0.18   :: Patient afebrile and without a leukocytosis   -pt ambulates with walker and cane at home  -Usually follows with Dr. Schmitz outpt ortho  -Type and screen ordered (09/06), coags with AM labs 9/7  Plan:  -Continue Tylenol 650 mg q6H PRN, oxycodone 5 mg q6H PRN for moderate pain, Oxycodone 10 mg q6H PRN for severe pain   -DVT ppx continuing ASA 81 mg EC BID x 4 weeks per ortho, DVT ultrasound not concerning for DVT 9/8  -PT/OT rec mod intensity level  -Successful I&D with ortho 9/7, intra-op cx pending, blood cx collected 9/7 ngtd  -Ortho Recs:  Weightbearing RLE as tolerated, Perioperative ABx: Ancef x 24 hr completed 9/8, Drain: W[place] set to suction   - PT/OT following  -Plastic surgery follow up in 2 weeks        #concern for RLE DVT   :: Has been on aspirin 81 mg BID for DVT Ppx, per  reports that has been given at facility   :: Exam with RLE erythema, warmth, and tenderness to palpation->High clinical suspicion for DVT of RLE   Plan:  DVT ultrasound not concerning for proximal DVT 9/8  -DVT ppx continuing ASA 81 mg EC BID x 4 weeks per ortho   - PT/OT following     #VEDA on CKD 3, resolving  -Cr 1.81 from 1.87 which seems close to baseline, lowest on record 1.63  -Pt on straight cath schedule at home done by , pt having   Plan:  -Encourage po intake, adding magic cups and ensure  - agreeable to placing clark 9/8 to reduce issues with delayed straight caths, pt forgets to cath himself as his  does it for him multiple times a day at home   -500 mL LR today given orthostatic hypotension and poor PO intake    #HTN  #HLD  Plan:  - Hold home lisinopril for now, consider restarting after hydration today  - continue with home Pravastatin     #GERD  Plan:  - continue with home PPI      #BPH  Plan:  - continue with home finasteride      #MDD  #Dementia   -9/8 baseline a&ox2 (self, location, and some of situation)  Plan:  - continue with home donepezil, Memantine   - per patient not on lexapro, do not restart unless clinically appropriate       F: PRN for euvolemia   E: PRN K>4, Mg>2, P>3   N: reg diet  A: PIV   DVT ppx: SCDs and ASA 81 BID  GI ppx: home ppi     Code Status: DNR/DNI of for ICU (confirmed on Admission)  Surrogate Medical Decision-maker:   Navi Cavazos [phone]       Nino Andrews MD PGY-1

## 2024-09-11 NOTE — NURSING NOTE
This patient got dizzy/lightheaded with PT. BP sitting was  167/82. BP standing was 136/92. The writer notified resident Lilly. The patient and family stated that dizziness is not normal for this patient.     1834-The patient was not dizzy or lightheaded with ambulation this afternoon into evening.

## 2024-09-11 NOTE — CARE PLAN
The clinical goals for the shift include pt will remain safe and use call light      Problem: Fall/Injury  Goal: Not fall by end of shift  Outcome: Progressing  Goal: Be free from injury by end of the shift  Outcome: Progressing  Goal: Verbalize understanding of personal risk factors for fall in the hospital  Outcome: Progressing  Goal: Verbalize understanding of risk factor reduction measures to prevent injury from fall in the home  Outcome: Progressing  Goal: Use assistive devices by end of the shift  Outcome: Progressing  Goal: Pace activities to prevent fatigue by end of the shift  Outcome: Progressing     Problem: Pain  Goal: Takes deep breaths with improved pain control throughout the shift  Outcome: Progressing  Goal: Turns in bed with improved pain control throughout the shift  Outcome: Progressing  Goal: Walks with improved pain control throughout the shift  Outcome: Progressing  Goal: Performs ADL's with improved pain control throughout shift  Outcome: Progressing  Goal: Participates in PT with improved pain control throughout the shift  Outcome: Progressing  Goal: Free from opioid side effects throughout the shift  Outcome: Progressing  Goal: Free from acute confusion related to pain meds throughout the shift  Outcome: Progressing     Problem: Pain - Adult  Goal: Verbalizes/displays adequate comfort level or baseline comfort level  Outcome: Progressing     Problem: Safety - Adult  Goal: Free from fall injury  Outcome: Progressing     Problem: Discharge Planning  Goal: Discharge to home or other facility with appropriate resources  Outcome: Progressing     Problem: Chronic Conditions and Co-morbidities  Goal: Patient's chronic conditions and co-morbidity symptoms are monitored and maintained or improved  Outcome: Progressing

## 2024-09-11 NOTE — PROGRESS NOTES
Physical Therapy    Physical Therapy Treatment    Patient Name: Gal Cramer  MRN: [id number]  Department: [place] [address]  Room: [address] [place] [address]  Today's Date: 9/11/2024  Time Calculation  Start Time: 1147  Stop Time: 1226  Time Calculation (min): 39 min         Assessment/Plan   PT Assessment  End of Session Communication: Bedside nurse  End of Session Patient Position: Up in chair, Alarm on     PT Plan  Treatment/Interventions: Bed mobility, Transfer training, Gait training, Balance training, Strengthening, Endurance training, Therapeutic exercise, Therapeutic activity  PT Plan: Ongoing PT  PT Eval Only Reason: At baseline function  PT Frequency: 5 times per week  PT Discharge Recommendations: Moderate intensity level of continued care  Equipment Recommended upon Discharge: Wheeled walker  PT Recommended Transfer Status: Assist x1  PT - OK to Discharge: Yes      General Visit Information:   PT  Visit  PT Received On: 09/11/24  General  Prior to Session Communication: Bedside nurse  Patient Position Received: Bed, 3 rail up, Alarm off, not on at start of session  General Comment: pt supine in bed upon entering, pt's  present. pt unable to ambulate previous distances secondary to othrostasis and N/v    Subjective   Precautions:  Precautions  LE Weight Bearing Status: Weight Bearing as Tolerated  Medical Precautions: Fall precautions    Vital Signs (Past 2hrs)        Date/Time Vitals Session Patient Position Pulse Resp SpO2 BP MAP (mmHg)    09/11/24 1119 --  --  --  --  --  167/82  --     09/11/24 1125 --  --  --  --  --  136/92  --     09/11/24 1129 --  --  --  --  --  172/77  --                         Objective   Pain:     Cognition:  Cognition  Orientation Level: Disoriented to time    Treatments:  Therapeutic Exercise  Therapeutic Exercise Performed: Yes  Therapeutic Exercise Activity 1: sitting AP and LAQ x15 reps AROM    Bed Mobility 1  Bed Mobility 1: Supine to sitting, Sitting to supine  Level of Assistance  1: Contact guard  Bed Mobility Comments 1: cues for lof roll    Ambulation/Gait Training 1  Surface 1: Level tile  Device 1: Rolling walker  Assistance 1: Minimum assistance  Quality of Gait 1: Diminished heel strike, Decreased step length, Shuffling gait  Comments/Distance (ft) 1: 2x35', 15 minute rest break secondary to N/v, viotals assesed during rest, see flowsheet  Transfer 1  Technique 1: Sit to stand, Stand to sit  Transfer Device 1: Walker  Transfer Level of Assistance 1: Contact guard         Outcome Measures:  [facility] Basic Mobility  Turning from your back to your side while in a flat bed without using bedrails: A little  Moving from lying on your back to sitting on the side of a flat bed without using bedrails: A little  Moving to and from bed to chair (including a wheelchair): A little  Standing up from a chair using your arms (e.g. wheelchair or bedside chair): A little  To walk in hospital room: A little  Climbing 3-5 steps with railing: Total  Basic Mobility - Total Score: 16    Education Documentation  Mobility Training, taught by Dayron Angela PTA at 9/11/2024  1:00 PM.  Learner: Patient  Readiness: Acceptance  Method: Explanation  Response: Verbalizes Understanding    Education Comments  No comments found.        OP EDUCATION:       Encounter Problems       Encounter Problems (Active)       Mobility       STG - Patient will ambulate >75ft, wheeled walker, min assist (Progressing)       Start:  09/08/24    Expected End:  09/15/24               PT Transfers       STG - Patient to transfer to and from sit to supine SBA (Progressing)       Start:  09/08/24    Expected End:  09/15/24            STG - Patient will transfer sit to and from stand SBA (Progressing)       Start:  09/08/24    Expected End:  09/15/24               Pain - Adult

## 2024-09-11 NOTE — CARE PLAN
The patient's goals for the shift include pt will not complain of pain -met    The clinical goals for the shift include pt will remain safe and utilize call light -met

## 2024-09-12 VITALS
BODY MASS INDEX: 24.59 KG/M2 | OXYGEN SATURATION: 100 % | DIASTOLIC BLOOD PRESSURE: 93 MMHG | HEART RATE: 72 BPM | RESPIRATION RATE: 20 BRPM | WEIGHT: 166 LBS | SYSTOLIC BLOOD PRESSURE: 188 MMHG | HEIGHT: 69 IN | TEMPERATURE: 98.4 F

## 2024-09-12 LAB
ALBUMIN SERPL BCP-MCNC: 3.7 G/DL (ref 3.4–5)
ANION GAP SERPL CALC-SCNC: 14 MMOL/L (ref 10–20)
BASOPHILS # BLD AUTO: 0.07 X10*3/UL (ref 0–0.1)
BASOPHILS NFR BLD AUTO: 1.4 %
BUN SERPL-MCNC: 27 MG/DL (ref 6–23)
CALCIUM SERPL-MCNC: 9.6 MG/DL (ref 8.6–10.6)
CHLORIDE SERPL-SCNC: 104 MMOL/L (ref 98–107)
CO2 SERPL-SCNC: 27 MMOL/L (ref 21–32)
CREAT SERPL-MCNC: 1.57 MG/DL (ref 0.5–1.3)
EGFRCR SERPLBLD CKD-EPI 2021: 43 ML/MIN/1.73M*2
EOSINOPHIL # BLD AUTO: 0.25 X10*3/UL (ref 0–0.4)
EOSINOPHIL NFR BLD AUTO: 5.1 %
ERYTHROCYTE [DISTWIDTH] IN BLOOD BY AUTOMATED COUNT: 13.1 % (ref 11.5–14.5)
GLUCOSE SERPL-MCNC: 84 MG/DL (ref 74–99)
HCT VFR BLD AUTO: 30.1 % (ref 41–52)
HGB BLD-MCNC: 10.9 G/DL (ref 13.5–17.5)
IMM GRANULOCYTES # BLD AUTO: 0.02 X10*3/UL (ref 0–0.5)
IMM GRANULOCYTES NFR BLD AUTO: 0.4 % (ref 0–0.9)
LYMPHOCYTES # BLD AUTO: 1.23 X10*3/UL (ref 0.8–3)
LYMPHOCYTES NFR BLD AUTO: 25.3 %
MAGNESIUM SERPL-MCNC: 2.04 MG/DL (ref 1.6–2.4)
MCH RBC QN AUTO: 36.3 PG (ref 26–34)
MCHC RBC AUTO-ENTMCNC: 36.2 G/DL (ref 32–36)
MCV RBC AUTO: 100 FL (ref 80–100)
MONOCYTES # BLD AUTO: 0.42 X10*3/UL (ref 0.05–0.8)
MONOCYTES NFR BLD AUTO: 8.6 %
NEUTROPHILS # BLD AUTO: 2.88 X10*3/UL (ref 1.6–5.5)
NEUTROPHILS NFR BLD AUTO: 59.2 %
NRBC BLD-RTO: 0 /100 WBCS (ref 0–0)
PHOSPHATE SERPL-MCNC: 3.3 MG/DL (ref 2.5–4.9)
PLATELET # BLD AUTO: 206 X10*3/UL (ref 150–450)
POTASSIUM SERPL-SCNC: 4.5 MMOL/L (ref 3.5–5.3)
RBC # BLD AUTO: 3 X10*6/UL (ref 4.5–5.9)
SODIUM SERPL-SCNC: 140 MMOL/L (ref 136–145)
WBC # BLD AUTO: 4.9 X10*3/UL (ref 4.4–11.3)

## 2024-09-12 PROCEDURE — 2500000001 HC RX 250 WO HCPCS SELF ADMINISTERED DRUGS (ALT 637 FOR MEDICARE OP)

## 2024-09-12 PROCEDURE — 80069 RENAL FUNCTION PANEL: CPT | Performed by: NUTRITIONIST

## 2024-09-12 PROCEDURE — 83735 ASSAY OF MAGNESIUM: CPT

## 2024-09-12 PROCEDURE — 36415 COLL VENOUS BLD VENIPUNCTURE: CPT | Performed by: NUTRITIONIST

## 2024-09-12 PROCEDURE — 85025 COMPLETE CBC W/AUTO DIFF WBC: CPT | Performed by: NUTRITIONIST

## 2024-09-12 PROCEDURE — 2500000004 HC RX 250 GENERAL PHARMACY W/ HCPCS (ALT 636 FOR OP/ED)

## 2024-09-12 PROCEDURE — 99239 HOSP IP/OBS DSCHRG MGMT >30: CPT

## 2024-09-12 ASSESSMENT — COGNITIVE AND FUNCTIONAL STATUS - GENERAL
WALKING IN HOSPITAL ROOM: A LITTLE
CLIMB 3 TO 5 STEPS WITH RAILING: A LOT
MOVING TO AND FROM BED TO CHAIR: A LITTLE
MOBILITY SCORE: 18
STANDING UP FROM CHAIR USING ARMS: A LOT

## 2024-09-12 ASSESSMENT — PAIN - FUNCTIONAL ASSESSMENT: PAIN_FUNCTIONAL_ASSESSMENT: 0-10

## 2024-09-12 ASSESSMENT — PAIN SCALES - GENERAL: PAINLEVEL_OUTOF10: 0 - NO PAIN

## 2024-09-12 NOTE — CARE PLAN
The patient's goals for the shift include  free from infection     The clinical goals for the shift include Patient will remain comfortable throughout shift.      Problem: Fall/Injury  Goal: Not fall by end of shift  Outcome: Progressing  Goal: Be free from injury by end of the shift  Outcome: Progressing  Goal: Verbalize understanding of personal risk factors for fall in the hospital  Outcome: Progressing  Goal: Verbalize understanding of risk factor reduction measures to prevent injury from fall in the home  Outcome: Progressing  Goal: Use assistive devices by end of the shift  Outcome: Progressing  Goal: Pace activities to prevent fatigue by end of the shift  Outcome: Progressing     Problem: Pain  Goal: Takes deep breaths with improved pain control throughout the shift  Outcome: Progressing  Goal: Turns in bed with improved pain control throughout the shift  Outcome: Progressing  Goal: Walks with improved pain control throughout the shift  Outcome: Progressing  Goal: Performs ADL's with improved pain control throughout shift  Outcome: Progressing  Goal: Participates in PT with improved pain control throughout the shift  Outcome: Progressing  Goal: Free from opioid side effects throughout the shift  Outcome: Progressing  Goal: Free from acute confusion related to pain meds throughout the shift  Outcome: Progressing

## 2024-09-12 NOTE — PROGRESS NOTES
[facility] 1000 stretcher  transport for discharge to [facility]. Report # . SW updated TCC, patient, , SNF and the floor. [place] sent.       NATE Dennis

## 2024-09-12 NOTE — CARE PLAN
The patient's goals for the shift include pt will not complain of pain    The clinical goals for the shift include pt will remain safe and utilize call light

## 2024-09-12 NOTE — DISCHARGE SUMMARY
Discharge Diagnosis  Traumatic hematoma of right knee    Issues Requiring Follow-Up  Patient's lisinopril held while inpatient due to VEDA. Can restart as outpatient if clinically indicated.    Follow up in 2 weeks with plastics for wound follow up. Wound vac to remain in place per orders.    Follow up Sept 25th to establish care with  PCP    Discharge Meds     Medication List      START taking these medications     pantoprazole 40 mg EC tablet; Commonly known as: ProtoNix; Take 1 tablet   (40 mg) by mouth once daily in the morning. Take before meals. Do not   crush, chew, or split.; Replaces: omeprazole 10 mg DR capsule     CHANGE how you take these medications     aspirin 81 mg EC tablet; Take 1 tablet (81 mg) by mouth 2 times a day   for 26 days, THEN 1 tablet (81 mg) once daily.; Start taking on: September   10, 2024; What changed: See the new instructions.     CONTINUE taking these medications     acetaminophen 325 mg tablet; Commonly known as: Tylenol; Take 2 tablets   (650 mg) by mouth every 6 hours if needed for mild pain (1 - 3).   calcium carbonate-vitamin D3 500 mg-5 mcg (200 unit) tablet; Take 2   tablets by mouth once daily.   donepezil 10 mg tablet; Commonly known as: Aricept   escitalopram 10 mg tablet; Commonly known as: Lexapro; Take 1 tablet (10   mg) by mouth once daily.   finasteride 5 mg tablet; Commonly known as: Proscar   lisinopril 20 mg tablet   memantine 10 mg tablet; Commonly known as: Namenda; Take 1 tablet (10   mg) by mouth once daily.   mirtazapine 15 mg tablet; Commonly known as: Remeron; Take 1 tablet (15   mg) by mouth once daily at bedtime.   polyethylene glycol 17 gram packet; Commonly known as: Glycolax,   Miralax; Take 17 g by mouth once daily.   pravastatin 10 mg tablet; Commonly known as: Pravachol     STOP taking these medications     omeprazole 10 mg DR capsule; Commonly known as: PriLOSEC; Replaced by:   pantoprazole 40 mg EC tablet       Test Results Pending At  Discharge  Pending Labs       No current pending labs.            Hospital Course  Mr. Cramer is a 84 y.o. male with a hx of dementia, HTN, HLD, CKD 3, GERD, peripheral neuropathy, and BPH (-cath 3-4x every day)  who presented to the ED 9/6 from outpatient orthopedic office due to concern for recurrent right knee hematoma, admitted to Medicine 9/7 for elly-operative management.      Per chart review, admitted to [facility] 08/17-08/21 following unprovoked mechanical fall down 1 step resulting in R Knee hematoma which rapidly increased in size with a hemorrhagic bullae on the anteromedial portion of the R knee, causing lateralization of the patella. Patient evaluated by orthopedic surgery for hematoma management, and taken for I&D of hematoma on 8/19. Postoperatively, patient was doing well and endorsed minimal pain. DVT PPx was restarted with ASA 81 BID on POD1, and hemovac drain removed on 8/21. Discharged in stable condition to SNF.      Saw Orthopedic PA 09/06 for post-op check. At this time patient with increased swelling in right knee. Also with blister over the medial knee that popped a few days ago. Since blister has opened he has had a large wound to the medial knee. This has been managed with daily Xeroform dressings at SNF. Per Ortho appears that shortly after discharge patient had recurrence of his hematoma which lead to a swelling blister that has since popped. The area of previous blistering is quite large and has eschar forming. Discussed with on-call surgeon and recommenced ER visit for admission for repeat I&D. Per documentation he could potentially also need plastics consult for wound management due to concern for skin viability.      On interview, patient is relatively asx. He denies pain to the knee. Per , reports that patient has had increased swelling and erythema surrounding the knee despite daily dressing changes and ACE wrap placement. Patient overtly denies fevers/chill and  purulent drainage. He does report RLE tenderness to palpation. He denies chest pain and SOB. He has noted increased warmth to RLE.    On initial assessment pt was afebrile, HDS, with O2 sats appropriate on RA. Overall concerning for re-accumulation of right knee hematoma with overlying ulcer noted to be a ruptured blister. SIRS negative on admission, Blood cultures pending start IV Abx if positive. Successful I&D with ortho 9/7, intra-op cx pending. Pain has been controlled and patient allowed to weightbearing as tolerated. Pt has wound vac placed pending plastic surgery closure, Plastic Surgery recs: Plastic Surgery will Evaluate to decide on definitve vac management vs local advancement flap for tissue coverage. Holding lisinopril until after plastic surgery, if applicable. ANCEF completed 24 hours after surgery on 9/8.     Also, high clinical concern for RLE DVT at this time. Continuing home ASA for dvt ppx. DVT ultrasound not concerning for DVT 9/8. PT/OT following rec mod intensity. Plastic surgery with recommendations to allow wound to heal by secondary intention and follow up in 2 weeks.    Patient with episode of orthostatic hypotension without syncope 9/11 likely iso of dehydration. Patient responded well to 1L LR with no orthostatic hypotension morning of 9/12. Patient's clark removed upon discharge as he can straight cath and requests this. Discharged in stable condition to SNF with plans for plastic surgery and PCP follow up.       Pertinent Physical Exam At Time of Discharge  Constitutional:       General: He is not in acute distress.     Appearance: Normal appearance.   HENT:      Head: Normocephalic and atraumatic.      Mouth/Throat:      Mouth: Mucous membranes are moist.   Eyes:      General: No scleral icterus.     Extraocular Movements: Extraocular movements intact.      Conjunctiva/sclera: Conjunctivae normal.      Pupils: Pupils are equal, round, and reactive to light.   Cardiovascular:       Rate and Rhythm: Normal rate and regular rhythm.      Heart sounds: No murmur heard.     No friction rub. No gallop.   Pulmonary:      Effort: Pulmonary effort is normal. No respiratory distress.      Breath sounds: Normal breath sounds. No stridor. No wheezing, rhonchi or rales.   Abdominal:      General: Abdomen is flat. Bowel sounds are normal. There is no distension.      Palpations: Abdomen is soft. There is no mass.      Tenderness: There is no abdominal tenderness. There is no guarding.   Musculoskeletal:      Right lower leg: No edema.      Left lower leg: No edema.      Comments: RLE wrapped in ace bandage and with wound vac attached   Skin:     General: Skin is warm and dry.   Neurological:      Mental Status: He is alert.      Comments: Alert and oriented to person and place.     Outpatient Follow-Up  Future Appointments   Date Time Provider Department [place]   9/25/2024  9:40 AM Sancho Graham MD [id number] [facility]   9/30/2024  3:15 PM Jose Murray MD [id number] Academic         Nino Andrews MD PGY-1

## 2024-09-12 NOTE — NURSING NOTE
The writer tried to call [facility] twice. No one answered and their voicemail box was full.     1020-The writer spoke with EMT transport and gave report to [place].     The patients clark was removed per orders. The patient's wound vac was removed per orders and a wet to moist dressing was applied to the area.     The patient was picked up by transport. Discharge paperwork was given to transport. The patient's  was present for discharge teaching. The patient had his belongings at discharge. IV was removed. Dr. Andrews came up to address patient's and family's concerns.

## 2024-09-13 ENCOUNTER — NURSING HOME VISIT (OUTPATIENT)
Dept: POST ACUTE CARE | Facility: EXTERNAL LOCATION | Age: 84
End: 2024-09-13
Payer: MEDICARE

## 2024-09-13 DIAGNOSIS — R55 NEAR SYNCOPE: ICD-10-CM

## 2024-09-13 DIAGNOSIS — E78.5 HYPERLIPIDEMIA, UNSPECIFIED HYPERLIPIDEMIA TYPE: ICD-10-CM

## 2024-09-13 DIAGNOSIS — E55.9 VITAMIN D DEFICIENCY: ICD-10-CM

## 2024-09-13 DIAGNOSIS — M25.561 ACUTE PAIN OF RIGHT KNEE: ICD-10-CM

## 2024-09-13 DIAGNOSIS — F03.918 DEMENTIA WITH BEHAVIORAL DISTURBANCE (MULTI): ICD-10-CM

## 2024-09-13 DIAGNOSIS — R39.14 BENIGN PROSTATIC HYPERPLASIA WITH INCOMPLETE BLADDER EMPTYING: ICD-10-CM

## 2024-09-13 DIAGNOSIS — S81.001D OPEN WOUND OF RIGHT KNEE, SUBSEQUENT ENCOUNTER: ICD-10-CM

## 2024-09-13 DIAGNOSIS — D64.9 ANEMIA, UNSPECIFIED TYPE: ICD-10-CM

## 2024-09-13 DIAGNOSIS — S80.01XD TRAUMATIC HEMATOMA OF RIGHT KNEE, SUBSEQUENT ENCOUNTER: Primary | ICD-10-CM

## 2024-09-13 DIAGNOSIS — F32.A DEPRESSION, UNSPECIFIED DEPRESSION TYPE: ICD-10-CM

## 2024-09-13 DIAGNOSIS — N40.1 BENIGN PROSTATIC HYPERPLASIA WITH INCOMPLETE BLADDER EMPTYING: ICD-10-CM

## 2024-09-13 DIAGNOSIS — I10 PRIMARY HYPERTENSION: ICD-10-CM

## 2024-09-13 DIAGNOSIS — R29.6 RECURRENT FALLS: ICD-10-CM

## 2024-09-13 DIAGNOSIS — R63.4 WEIGHT LOSS: ICD-10-CM

## 2024-09-13 DIAGNOSIS — R53.81 PHYSICAL DECONDITIONING: ICD-10-CM

## 2024-09-13 DIAGNOSIS — N18.9 CHRONIC KIDNEY DISEASE, UNSPECIFIED CKD STAGE: ICD-10-CM

## 2024-09-13 PROCEDURE — 99306 1ST NF CARE HIGH MDM 50: CPT | Performed by: INTERNAL MEDICINE

## 2024-09-13 NOTE — PROGRESS NOTES
"Reason for admission to [facility] for post-acute rehab traumatic left knee hematoma and physical deconditioning    Subjective   Mr. Cramer 84 y.o. year old male was admitted to [facility] unit on 9/12/24. Patient is seen for the initial SNF visit on 9/13/24. He has a past medical history of dementia, HTN, CKD, HLD, GERD, peripheral neuropathy, and BPH (self-cath 3-4x every day).    Was recently admitted 8/21 after hospital stay (8/17-8/21) for fall down 1 step and R knee hematoma s/p I&D on 8/19. He was admitted to  for post-acute rehab for deconditioning on 8/21. But then was readmitted to the hospital on 8/6 directly from an ortho follow up office visit.   Per ortho 8/6  \"84 y.o. male post-op from incision and drainage of traumatic right knee hematoma on 8/19/2024  Plan:  Sounds like shortly after discharge in the hospital patient had recurrence of his hematoma which would lead to a swelling blister that has since popped.  The area of previous blistering is quite large and has eschar forming.  I am really concerned about the viability of his skin at this point.  I discussed his case with the on-call surgeon and we decided that the best course of action is for patient to be seen in the ER and admitted to the hospital for I&D.  He could potentially also need plastics consult for wound management.  I discussed this plan with his  and their personal medical provider that presented today and they are both in agreement with the plan.\"    Admitted from: acute care hospital  Facility: [facility]   Dates of hospitalization: 9/6/24-9/12/24  Hospital Course:  Discharge Diagnosis  Traumatic hematoma of right knee     Issues Requiring Follow-Up  Patient's lisinopril held while inpatient due to VEDA. Can restart as outpatient if clinically indicated.  Follow up in 2 weeks with plastics for wound follow up. Wound vac to remain in place per orders.  Follow up Sept 25th to establish care with " " PCP  Per discharge summary  \"Mr. Cramer is a 84 y.o. male with a hx of dementia, HTN, HLD, CKD 3, GERD, peripheral neuropathy, and BPH (-cath 3-4x every day)  who presented to the ED 9/6 from outpatient orthopedic office due to concern for recurrent right knee hematoma, admitted to Medicine 9/7 for elly-operative management.   Per chart review, admitted to [facility] 08/17-08/21 following unprovoked mechanical fall down 1 step resulting in R Knee hematoma which rapidly increased in size with a hemorrhagic bullae on the anteromedial portion of the R knee, causing lateralization of the patella. Patient evaluated by orthopedic surgery for hematoma management, and taken for I&D of hematoma on 8/19. Postoperatively, patient was doing well and endorsed minimal pain. DVT PPx was restarted with ASA 81 BID on POD1, and hemovac drain removed on 8/21. Discharged in stable condition to SNF.   Saw Orthopedic PA 09/06 for post-op check. At this time patient with increased swelling in right knee. Also with blister over the medial knee that popped a few days ago. Since blister has opened he has had a large wound to the medial knee. This has been managed with daily Xeroform dressings at SNF. Per Ortho appears that shortly after discharge patient had recurrence of his hematoma which lead to a swelling blister that has since popped. The area of previous blistering is quite large and has eschar forming. Discussed with on-call surgeon and recommenced ER visit for admission for repeat I&D. Per documentation he could potentially also need plastics consult for wound management due to concern for skin viability.   On interview, patient is relatively asx. He denies pain to the knee. Per , reports that patient has had increased swelling and erythema surrounding the knee despite daily dressing changes and ACE wrap placement. Patient overtly denies fevers/chill and purulent drainage. He does report RLE tenderness to palpation. He " "denies chest pain and SOB. He has noted increased warmth to RLE.  On initial assessment pt was afebrile, HDS, with O2 sats appropriate on RA. Overall concerning for re-accumulation of right knee hematoma with overlying ulcer noted to be a ruptured blister. SIRS negative on admission, Blood cultures pending start IV Abx if positive. Successful I&D with ortho 9/7, intra-op cx pending. Pain has been controlled and patient allowed to weightbearing as tolerated. Pt has wound vac placed pending plastic surgery closure, Plastic Surgery recs: Plastic Surgery will Evaluate to decide on definitve vac management vs local advancement flap for tissue coverage. Holding lisinopril until after plastic surgery, if applicable. ANCEF completed 24 hours after surgery on 9/8.   Also, high clinical concern for RLE DVT at this time. Continuing home ASA for dvt ppx. DVT ultrasound not concerning for DVT 9/8. PT/OT following rec mod intensity. Plastic surgery with recommendations to allow wound to heal by secondary intention and follow up in 2 weeks.  Patient with episode of orthostatic hypotension without syncope 9/11 likely iso of dehydration. Patient responded well to 1L LR with no orthostatic hypotension morning of 9/12. Patient's clark removed upon discharge as he can straight cath and requests this. Discharged in stable condition to SNF with plans for plastic surgery and PCP follow up. \"    Primary caregiver: patient and   : Wes Cavazos,  [phone]    PCP: Fitz Power MD    HPI     Per patient:  - denies pain currently  - bowels moved today  - no sob, cp  - appetite ok     Per  (obtained in addition due to pt's dementia)  - pt was working with PT in hospital and was walking. And then started feeling sick. Was brought back to his bed and had dry heaves  -  says pt got a little tired while walkign with PT here today     Living environment prior to admission: condo with his . No stairs   "   Assistive Devices: straight cane     ADLS prior to admission   Is dependant or requires assistance in the following BADL: was hesitant to bathe at home. But was using a tub shower. And maybe was afraid of falling. Needs stand by assistance with bathing. Dresses on his own  Is dependant or requires assistance in the following Instrumental ADL: most     Advanced Directives on file: wishes to be DNR-Comfort care -arrest    Medications reviewed and reconciled.     Objective     Physical Exam  Vitals: 129/74, 137/89, 65-80, 18, 97.6F, % on RA,   Constitutional:  Well-nourished, kempt  Head: NC/AT  Eyes: no scleral erythema. No icterus    Dentition: fair    Oropharyx: clear    Neck: supple. trachea midline. Thyroid not enlarged  Lymphatics: No adenopathy at neck  Respiratory: clear without rales, rhonchi or wheezes  Cardiovascular: RRR, +S1, S2, no murmurs appreciated    Extremeties: 1+ edema of R leg and trace edema L leg. Right leg with still some resolving bruising.   No clubbing, cyanosis  Gastrointestinal: +BS, soft, nontender.  Genitourinary: no clark  Integumentary: No ulcers, pressure sores, rash  Musculoskeletal:     Contractures: none    Muscle bulk: nl    Digits/nails: nl    Effusions: none   Neurologic: alert    Get up and go:  not observed  Psychologic: affect full     Labs done 9/13/24  Glucose 85, sodium 140, k_ 4.2, cl 102, c02 28, bun 26, cr 1.6, ca 9.4, wbc 4.5, hgb 10.9, hematocrit 10.9, mcv 95.9, plts 228    Component  Ref Range & Units 1 d ago  (9/12/24) 2 d ago  (9/11/24) 3 d ago  (9/10/24) 4 d ago  (9/9/24) 5 d ago  (9/8/24) 6 d ago  (9/7/24) 7 d ago  (9/6/24) 7 d ago  (9/6/24)   Glucose  74 - 99 mg/dL 84 96 87 88 95 93 92 92   Sodium  136 - 145 mmol/L 140 137 137 138 140 141 140 140   Potassium  3.5 - 5.3 mmol/L 4.5 4.5 4.9 4.8 4.4 4.4 4.3 4.3   Chloride  98 - 107 mmol/L 104 102 102 105 105 107 104 104   Bicarbonate  21 - 32 mmol/L 27 28 28 26 27 26 27 27   Anion Gap  10 - 20 mmol/L 14 12  12 12 12 12 13 13   Urea Nitrogen  6 - 23 mg/dL 27 High  31 High  28 High  26 High  26 High  34 High  33 High  33 High    Creatinine  0.50 - 1.30 mg/dL 1.57 High  1.68 High  1.59 High  1.81 High  1.86 High  1.63 High  1.87 High  1.87 High    eGFR  >60 mL/min/1.73m*2 43 Low  40 Low  CM 42 Low  CM 36 Low  CM 35 Low  CM 41 Low  CM 35 Low  CM 35 Low  CM   Comment: Calculations of estimated GFR are performed using the 2021 CKD-EPI Study Refit equation without the race variable for the IDMS-Traceable creatinine methods.  https://jasn.asnjournals.org/content/early/2021/09/22/ASN.6212633298   Calcium  8.6 - 10.6 mg/dL 9.6 9.1 9.4 9.2 9.2 9.3 9.6 9.6   Phosphorus  2.5 - 4.9 mg/dL 3.3 3.3 CM 3.5 CM 3.9 CM 4.0 CM      Comment: The performance characteristics of phosphorus testing in heparinized plasma have been validated by the individual  laboratory site where testing is performed. Testing on heparinized plasma is not approved by the FDA; however, such approval is not necessary.   Albumin  3.4 - 5.0 g/dL 3.7 3.6 3.7 3.7 3.8 4.0  4.3     Component  Ref Range & Units 1 d ago  (9/12/24) 2 d ago  (9/11/24) 3 d ago  (9/10/24) 4 d ago  (9/9/24) 5 d ago  (9/8/24) 6 d ago  (9/7/24) 7 d ago  (9/6/24)   WBC  4.4 - 11.3 x10*3/uL 4.9 5.2 4.2 Low  4.8 4.9 5.3 5.6   Hemoglobin  13.5 - 17.5 g/dL 10.9 Low  9.6 Low  9.9 Low  9.8 Low  9.9 Low  10.4 Low  9.6 Low    Hematocrit  41.0 - 52.0 % 30.1 Low  26.0 Low  28.3 Low  28.7 Low  29.1 Low  29.8 Low  27.8 Low    MCV  80 - 100 fL 100 99 100 98 99 100 93   RDW  11.5 - 14.5 % 13.1 12.6 12.7 12.4 12.4 13.0 12.6   Platelets  150 - 450 x10*3/uL 206 185 202 206 205 241 232   0 Result Notes            Component  Ref Range & Units 1 d ago 2 d ago 3 d ago 4 d ago 5 d ago 6 d ago 3 wk ago   Magnesium  1.60 - 2.40 mg/dL 2.04 1.97 2.02 1.99 1.93 2.10 1.86            Component  Ref Range & Units 7 d ago   C-Reactive Protein  <1.00 mg/dL 0.18          Blood culture: NGTD  CT head wo IV contrast  08/17/2024  CT maxillofacial bones wo IV contrast 08/17/2024  CT cervical spine wo IV contrast 08/17/2024  CT HEAD:  No hyperdense intracranial hemorrhage is identified. There is no mass  effect or midline shift.  Gray-white differentiation is intact, without evidence of CT apparent  transcortical infarct, although patchy attenuation changes are  present in the periventricular and subcortical white matter of  bilateral cerebral hemispheres, nonspecific findings favored to  represent sequela of microvascular disease.  No abnormal ventricular dilatation is present. Basal cisterns are  patent. No extra-axial fluid collections are identified.  Scalp soft tissues do not demonstrate any acute abnormality.  Calvarium is unremarkable without evidence of skull fracture.  Mastoid air cells and middle ear cavities are clear.  CT FACIAL BONES:  Bony orbits are intact, without evidence of fracture. Periorbital  soft tissues and intraorbital structures are symmetric in appearance  without evidence of acute trauma. Patient is status post cataract  extraction bilaterally.  No acute facial bone fracture is identified. Nasal bones are  unremarkable in appearance.  Paranasal sinuses are well aerated without evidence of air-fluid  levels.  Some soft tissue swelling overlies the chin, without evidence of  underlying osseous injury. No associated fluid collection or soft  tissue gas is present.Although evaluation of the oral cavity is degraded by beam hardening artifact no definite evidence of acute trauma to the oral cavity is present. Temporomandibular joints are intact, with asymmetric  degenerative changes present in the right.  Large periapical lucency surrounds the roots of the 2nd right  maxillary molar.  Parotid and submandibular glands are symmetric in appearance and  otherwise unremarkable.  CT C-SPINE:  There is straightening of normal lordotic curvature of the cervical  spine, without evidence of significant  spondylolisthesis.  Cervical vertebral body heights are preserved without evidence of  compression fractures, although some insufficiency endplate changes  with smaller Schmorl's nodes are present at several levels. There is  no evidence of acute trauma to the posterior elements of the cervical  spine.  Craniocervical junction is intact, although degenerative changes at  the atlantoaxial articulation with hypertrophic pannus extending into  the anterior epidural space of C1 contributing to mild spinal canal  narrowing at this level with the effacement of the anterior  subarachnoid space.  Facet joints are preserved without evidence of traumatic subluxation  or perching, although multilevel degenerate facet osteoarthropathy is  evident, most pronounced at C2-C3 and C3-C4 on the left.  Multilevel intervertebral disc height loss is present, moderate to  severe at C4-C5 and C5-C6 and mild at other levels.  No high-grade stenosis is identified in the cervical spine, although  mild spinal canal narrowing is suspected at C3-C4, C4-C5 and C5-C6  due to disc osteophyte complexes and ligamentum flavum thickening.  Mild spinal canal narrowing is present at C3-C4 bilaterally and C4-C5  on the left due to uncovertebral and facet joint hypertrophy.  Prevertebral and paraspinal soft tissues do not demonstrate any acute  abnormalities. Included lung apices are clear.  Impression  CT HEAD:  1. No evidence of hemorrhage, skull fracture, or other acute  intracranial trauma/abnormality.  2. Patchy attenuation changes are present in the periventricular and  subcortical white matter of bilateral cerebral hemispheres,  nonspecific findings favored to represent sequela of microvascular  disease.  CT FACIAL BONES:  1. Soft tissue swelling overlies the chin, likely posttraumatic  without absence of the underlying osseous injury. No facial or  orbital bone fracture is present.  2. Large periapical lucency surrounds the roots of the 2nd  right  mandibular molar, correlate with dental exam.  CT C-SPINE:  1. No evidence of acute trauma to the cervical spine.  2. Multilevel degenerative changes of the cervical spine are present,  with intervertebral disc height loss, facet osteoarthropathy, and  mild spinal canal and neural foraminal stenosis at several levels due  to disc osteophyte complexes, ligamentum flavum thickening and  hypertrophic facet changes.       Assessment/Plan    Right knee hematoma, Status post incision and drainage x 2  2. Knee pain   3. R knee wound  - fell at home down 1 step on 8/17. Found to have R knee hematoma causing lateralization of patella. Underwent I&D by ortho on 8/19   - had an area of bleeding medial to the drainage incisions. it blistered and skin became necrotic. Was readmitted on 9/6 and had I and D on 9/7. The necrotic skin was removed and has a wound vac. Will likely not need surgical wound closure. Has f/up with plastics surgery in 2 weeks.   - is on aspirin 81mg bID for DVT prophylaxis for 26 days and then once daily  - on tylenol 1000mg TID for pain      4. Recurrent falls  5. Physical deconditioning   - falls are likely multifactorial due to cognitive impairment, medication, osteoarthritis  - PT/OT - eval and treat     6. HTN  7. Near syncope  Is on lisinopril 20mg daily. This was held in hospital due to VEDA.  also had a possible near syncopal episode on 9/11 in hospital while working with PT. Had hypotension.  He was discharged back to [facility] on the lisinopril 20mg. Again felt a little lightheaded while walking with PT today. Bps have been relatively low. Will decrease lisinopril to 10mg daily. Also given near syncopal episodes, will decrease donepezil to 5mg daily     8. Dementia- mild CDR 1.0  - gets help from  with most adls. Needs stand by assistance with bathing, mainly due to risk for falls.   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 MRI in 8/2023 showed hippocampal volume at 4%. Follows with both geriatrics and neurology at [facility].  Has been on donepezil 10mg daily. Also was on memantine 10mg BID though it was decreased to just at bedtime.  doesn't think the meds have helped at all for cognition. Also has had weight loss and presyncopal episodes. Will decrease to donepezil to 5mg daily     9. Weight loss  - down 12 lbs between 1/2024 and 6/2024. Has been on donepezil, which may have contributed to the weight loss at least in part. Was started on mirtazapine 7.5mg HS in 6/2024 to help with appetite and sleep. Now on 15mg daily.  notes that it has been helping with sleep. But still has been losing weight. Possibly down to 172 lbs prior to hospitalization. 168 lbs here prior to hospitalization. Will monitor weight weekly. Dietician following. Also will decrease donepezil to 5mg.      10. VEDA on CKD  - cr was up to 2.06 in hospital. Improved to 1.7 on 7/21 and was 1.57 on 9/12. Baseline is around 1.5. will monitor kidney function. Repeat renal panel today showed cr of 1.6. will monitor     11. Anemia  - hemoglobin decreased from 12 to 9.2 during hospitalization. Likely related to hematoma.  Hemoglobin 10.9 yesterday and today. Will monitor.      12. BPH with urination retention   - is on finasteride 5mg daily. And straight catheterizes himself with assistance 4 times daily. Will continue and monitor for urinary retention      13. HL   - is on pravastatin 10mg daily. If he is on this for primary prevention, could consider stopping this     14. GERD  - was pantoprazole 20mg daily. In hospital they increased this to 40mg daily. Not clear why.  Will continue for now though not clear he truly needs this. Could consider weaning off of this as an outpatient     15. Depression   - on escitalopram 10mg daily. Also on mirtazapine 15mg daily. Was started at 7.5mg HS in 6/2024 to  replace quetiapine. Will continue these meds for now.      16. Vitamin D deficiency  - D level was 39.6 in 6/2024. Is on D3 2000 units daily. Will continue     17. Goals of care counseling discussion   Wishes to be DNR-CC-A. [place] form completed today.       Disposition: Will discuss with pt, family, and interdisciplinary team throughout his rehab stay  Pt and  are interested in the house calls program for primary care upon discharge. Will put in a referral.     Tami Marroquin MD

## 2024-09-13 NOTE — DOCUMENTATION CLARIFICATION NOTE
"    PATIENT:               ALTON MONTES  ACCT #:                  [id number]  MRN:                       [id number]  :                       1940  ADMIT DATE:       2024 9:31 PM  DISCH DATE:        2024 11:06 AM  RESPONDING PROVIDER #:        [id number]          PROVIDER RESPONSE TEXT:    Excisional debridement down to and including subcutaneous tissue and fascia    CDI QUERY TEXT:    Clarification        Instruction:    Based on your assessment of the patient and the clinical information, please provide the requested documentation by clicking on the appropriate radio button and enter any additional information if prompted.    Question: Please further clarify the debridement of right knee procedure as    When answering this query, please exercise your independent professional judgment. The fact that a question is being asked, does not imply that any particular answer is desired or expected.    The patient's clinical indicators include:  Clinical Information: This query refers to the procedure performed on 24 and documented as Irrigation debridement skin subcutaneous tissue fascia.    Operative Note 24 by Dr. Lindsay Ya:    \"Once I debrided all the necrotic tissue.  I then copiously irrigated.\"  Options provided:  -- Excisional debridement down to and including subcutaneous tissue and fascia  -- Non-excisional debridement down to and including subcutaneous tissue and fascia  -- Other - I will add my own diagnosis  -- Refer to Clinical Documentation Reviewer    Query created by: Tiffany Patrick on 2024 2:46 PM      Electronically signed by:  LINDSAY YA MD 2024 5:27 PM          "

## 2024-09-13 NOTE — LETTER
"Patient: Gal Cramer  : 1940    Encounter Date: 2024    Reason for admission to [facility] for post-acute rehab traumatic left knee hematoma and physical deconditioning    Subjective  Mr. Cramer 84 y.o. year old male was admitted to [facility] unit on 24. Patient is seen for the initial SNF visit on 24. He has a past medical history of dementia, HTN, CKD, HLD, GERD, peripheral neuropathy, and BPH (self-cath 3-4x every day).    Was recently admitted  after hospital stay (-) for fall down 1 step and R knee hematoma s/p I&D on . He was admitted to  for post-acute rehab for deconditioning on . But then was readmitted to the hospital on  directly from an ortho follow up office visit.   Per ortho   \"84 y.o. male post-op from incision and drainage of traumatic right knee hematoma on 2024  Plan:  Sounds like shortly after discharge in the hospital patient had recurrence of his hematoma which would lead to a swelling blister that has since popped.  The area of previous blistering is quite large and has eschar forming.  I am really concerned about the viability of his skin at this point.  I discussed his case with the on-call surgeon and we decided that the best course of action is for patient to be seen in the ER and admitted to the hospital for I&D.  He could potentially also need plastics consult for wound management.  I discussed this plan with his  and their personal medical provider that presented today and they are both in agreement with the plan.\"    Admitted from: acute care hospital  Facility: [facility]   Dates of hospitalization: 24-24  Hospital Course:  Discharge Diagnosis  Traumatic hematoma of right knee     Issues Requiring Follow-Up  Patient's lisinopril held while inpatient due to VEDA. Can restart as outpatient if clinically indicated.  Follow up in 2 weeks with plastics for wound follow up. Wound vac to " "remain in place per orders.  Follow up Sept 25th to establish care with  PCP  Per discharge summary  \"Mr. Cramer is a 84 y.o. male with a hx of dementia, HTN, HLD, CKD 3, GERD, peripheral neuropathy, and BPH (-cath 3-4x every day)  who presented to the ED 9/6 from outpatient orthopedic office due to concern for recurrent right knee hematoma, admitted to Medicine 9/7 for elly-operative management.   Per chart review, admitted to [facility] 08/17-08/21 following unprovoked mechanical fall down 1 step resulting in R Knee hematoma which rapidly increased in size with a hemorrhagic bullae on the anteromedial portion of the R knee, causing lateralization of the patella. Patient evaluated by orthopedic surgery for hematoma management, and taken for I&D of hematoma on 8/19. Postoperatively, patient was doing well and endorsed minimal pain. DVT PPx was restarted with ASA 81 BID on POD1, and hemovac drain removed on 8/21. Discharged in stable condition to SNF.   Saw Orthopedic PA 09/06 for post-op check. At this time patient with increased swelling in right knee. Also with blister over the medial knee that popped a few days ago. Since blister has opened he has had a large wound to the medial knee. This has been managed with daily Xeroform dressings at SNF. Per Ortho appears that shortly after discharge patient had recurrence of his hematoma which lead to a swelling blister that has since popped. The area of previous blistering is quite large and has eschar forming. Discussed with on-call surgeon and recommenced ER visit for admission for repeat I&D. Per documentation he could potentially also need plastics consult for wound management due to concern for skin viability.   On interview, patient is relatively asx. He denies pain to the knee. Per , reports that patient has had increased swelling and erythema surrounding the knee despite daily dressing changes and ACE wrap placement. Patient overtly denies " "fevers/chill and purulent drainage. He does report RLE tenderness to palpation. He denies chest pain and SOB. He has noted increased warmth to RLE.  On initial assessment pt was afebrile, HDS, with O2 sats appropriate on RA. Overall concerning for re-accumulation of right knee hematoma with overlying ulcer noted to be a ruptured blister. SIRS negative on admission, Blood cultures pending start IV Abx if positive. Successful I&D with ortho 9/7, intra-op cx pending. Pain has been controlled and patient allowed to weightbearing as tolerated. Pt has wound vac placed pending plastic surgery closure, Plastic Surgery recs: Plastic Surgery will Evaluate to decide on definitve vac management vs local advancement flap for tissue coverage. Holding lisinopril until after plastic surgery, if applicable. ANCEF completed 24 hours after surgery on 9/8.   Also, high clinical concern for RLE DVT at this time. Continuing home ASA for dvt ppx. DVT ultrasound not concerning for DVT 9/8. PT/OT following rec mod intensity. Plastic surgery with recommendations to allow wound to heal by secondary intention and follow up in 2 weeks.  Patient with episode of orthostatic hypotension without syncope 9/11 likely iso of dehydration. Patient responded well to 1L LR with no orthostatic hypotension morning of 9/12. Patient's clark removed upon discharge as he can straight cath and requests this. Discharged in stable condition to SNF with plans for plastic surgery and PCP follow up. \"    Primary caregiver: patient and   : Wes Cavazos,  [phone]    PCP: Fitz Power MD    HPI     Per patient:  - denies pain currently  - bowels moved today  - no sob, cp  - appetite ok     Per  (obtained in addition due to pt's dementia)  - pt was working with PT in hospital and was walking. And then started feeling sick. Was brought back to his bed and had dry heaves  -  says pt got a little tired while walkign with PT here " today     Living environment prior to admission: condo with his . No stairs     Assistive Devices: straight cane     ADLS prior to admission   Is dependant or requires assistance in the following BADL: was hesitant to bathe at home. But was using a tub shower. And maybe was afraid of falling. Needs stand by assistance with bathing. Dresses on his own  Is dependant or requires assistance in the following Instrumental ADL: most     Advanced Directives on file: wishes to be DNR-Comfort care -arrest    Medications reviewed and reconciled.     Objective    Physical Exam  Vitals: 129/74, 137/89, 65-80, 18, 97.6F, % on RA,   Constitutional:  Well-nourished, kempt  Head: NC/AT  Eyes: no scleral erythema. No icterus    Dentition: fair    Oropharyx: clear    Neck: supple. trachea midline. Thyroid not enlarged  Lymphatics: No adenopathy at neck  Respiratory: clear without rales, rhonchi or wheezes  Cardiovascular: RRR, +S1, S2, no murmurs appreciated    Extremeties: 1+ edema of R leg and trace edema L leg. Right leg with still some resolving bruising.   No clubbing, cyanosis  Gastrointestinal: +BS, soft, nontender.  Genitourinary: no clark  Integumentary: No ulcers, pressure sores, rash  Musculoskeletal:     Contractures: none    Muscle bulk: nl    Digits/nails: nl    Effusions: none   Neurologic: alert    Get up and go:  not observed  Psychologic: affect full     Labs done 9/13/24  Glucose 85, sodium 140, k_ 4.2, cl 102, c02 28, bun 26, cr 1.6, ca 9.4, wbc 4.5, hgb 10.9, hematocrit 10.9, mcv 95.9, plts 228    Component  Ref Range & Units 1 d ago  (9/12/24) 2 d ago  (9/11/24) 3 d ago  (9/10/24) 4 d ago  (9/9/24) 5 d ago  (9/8/24) 6 d ago  (9/7/24) 7 d ago  (9/6/24) 7 d ago  (9/6/24)   Glucose  74 - 99 mg/dL 84 96 87 88 95 93 92 92   Sodium  136 - 145 mmol/L 140 137 137 138 140 141 140 140   Potassium  3.5 - 5.3 mmol/L 4.5 4.5 4.9 4.8 4.4 4.4 4.3 4.3   Chloride  98 - 107 mmol/L 104 102 102 105 105 107 104 104    Bicarbonate  21 - 32 mmol/L 27 28 28 26 27 26 27 27   Anion Gap  10 - 20 mmol/L 14 12 12 12 12 12 13 13   Urea Nitrogen  6 - 23 mg/dL 27 High  31 High  28 High  26 High  26 High  34 High  33 High  33 High    Creatinine  0.50 - 1.30 mg/dL 1.57 High  1.68 High  1.59 High  1.81 High  1.86 High  1.63 High  1.87 High  1.87 High    eGFR  >60 mL/min/1.73m*2 43 Low  40 Low  CM 42 Low  CM 36 Low  CM 35 Low  CM 41 Low  CM 35 Low  CM 35 Low  CM   Comment: Calculations of estimated GFR are performed using the 2021 CKD-EPI Study Refit equation without the race variable for the IDMS-Traceable creatinine methods.  https://jasn.asnjournals.org/content/early/2021/09/22/ASN.5007797671   Calcium  8.6 - 10.6 mg/dL 9.6 9.1 9.4 9.2 9.2 9.3 9.6 9.6   Phosphorus  2.5 - 4.9 mg/dL 3.3 3.3 CM 3.5 CM 3.9 CM 4.0 CM      Comment: The performance characteristics of phosphorus testing in heparinized plasma have been validated by the individual  laboratory site where testing is performed. Testing on heparinized plasma is not approved by the FDA; however, such approval is not necessary.   Albumin  3.4 - 5.0 g/dL 3.7 3.6 3.7 3.7 3.8 4.0  4.3     Component  Ref Range & Units 1 d ago  (9/12/24) 2 d ago  (9/11/24) 3 d ago  (9/10/24) 4 d ago  (9/9/24) 5 d ago  (9/8/24) 6 d ago  (9/7/24) 7 d ago  (9/6/24)   WBC  4.4 - 11.3 x10*3/uL 4.9 5.2 4.2 Low  4.8 4.9 5.3 5.6   Hemoglobin  13.5 - 17.5 g/dL 10.9 Low  9.6 Low  9.9 Low  9.8 Low  9.9 Low  10.4 Low  9.6 Low    Hematocrit  41.0 - 52.0 % 30.1 Low  26.0 Low  28.3 Low  28.7 Low  29.1 Low  29.8 Low  27.8 Low    MCV  80 - 100 fL 100 99 100 98 99 100 93   RDW  11.5 - 14.5 % 13.1 12.6 12.7 12.4 12.4 13.0 12.6   Platelets  150 - 450 x10*3/uL 206 185 202 206 205 241 232   0 Result Notes            Component  Ref Range & Units 1 d ago 2 d ago 3 d ago 4 d ago 5 d ago 6 d ago 3 wk ago   Magnesium  1.60 - 2.40 mg/dL 2.04 1.97 2.02 1.99 1.93 2.10 1.86            Component  Ref Range & Units 7 d ago   C-Reactive  Protein  <1.00 mg/dL 0.18          Blood culture: NGTD  CT head wo IV contrast 08/17/2024  CT maxillofacial bones wo IV contrast 08/17/2024  CT cervical spine wo IV contrast 08/17/2024  CT HEAD:  No hyperdense intracranial hemorrhage is identified. There is no mass  effect or midline shift.  Gray-white differentiation is intact, without evidence of CT apparent  transcortical infarct, although patchy attenuation changes are  present in the periventricular and subcortical white matter of  bilateral cerebral hemispheres, nonspecific findings favored to  represent sequela of microvascular disease.  No abnormal ventricular dilatation is present. Basal cisterns are  patent. No extra-axial fluid collections are identified.  Scalp soft tissues do not demonstrate any acute abnormality.  Calvarium is unremarkable without evidence of skull fracture.  Mastoid air cells and middle ear cavities are clear.  CT FACIAL BONES:  Bony orbits are intact, without evidence of fracture. Periorbital  soft tissues and intraorbital structures are symmetric in appearance  without evidence of acute trauma. Patient is status post cataract  extraction bilaterally.  No acute facial bone fracture is identified. Nasal bones are  unremarkable in appearance.  Paranasal sinuses are well aerated without evidence of air-fluid  levels.  Some soft tissue swelling overlies the chin, without evidence of  underlying osseous injury. No associated fluid collection or soft  tissue gas is present.Although evaluation of the oral cavity is degraded by beam hardening artifact no definite evidence of acute trauma to the oral cavity is present. Temporomandibular joints are intact, with asymmetric  degenerative changes present in the right.  Large periapical lucency surrounds the roots of the 2nd right  maxillary molar.  Parotid and submandibular glands are symmetric in appearance and  otherwise unremarkable.  CT C-SPINE:  There is straightening of normal lordotic  curvature of the cervical  spine, without evidence of significant spondylolisthesis.  Cervical vertebral body heights are preserved without evidence of  compression fractures, although some insufficiency endplate changes  with smaller Schmorl's nodes are present at several levels. There is  no evidence of acute trauma to the posterior elements of the cervical  spine.  Craniocervical junction is intact, although degenerative changes at  the atlantoaxial articulation with hypertrophic pannus extending into  the anterior epidural space of C1 contributing to mild spinal canal  narrowing at this level with the effacement of the anterior  subarachnoid space.  Facet joints are preserved without evidence of traumatic subluxation  or perching, although multilevel degenerate facet osteoarthropathy is  evident, most pronounced at C2-C3 and C3-C4 on the left.  Multilevel intervertebral disc height loss is present, moderate to  severe at C4-C5 and C5-C6 and mild at other levels.  No high-grade stenosis is identified in the cervical spine, although  mild spinal canal narrowing is suspected at C3-C4, C4-C5 and C5-C6  due to disc osteophyte complexes and ligamentum flavum thickening.  Mild spinal canal narrowing is present at C3-C4 bilaterally and C4-C5  on the left due to uncovertebral and facet joint hypertrophy.  Prevertebral and paraspinal soft tissues do not demonstrate any acute  abnormalities. Included lung apices are clear.  Impression  CT HEAD:  1. No evidence of hemorrhage, skull fracture, or other acute  intracranial trauma/abnormality.  2. Patchy attenuation changes are present in the periventricular and  subcortical white matter of bilateral cerebral hemispheres,  nonspecific findings favored to represent sequela of microvascular  disease.  CT FACIAL BONES:  1. Soft tissue swelling overlies the chin, likely posttraumatic  without absence of the underlying osseous injury. No facial or  orbital bone fracture is  present.  2. Large periapical lucency surrounds the roots of the 2nd right  mandibular molar, correlate with dental exam.  CT C-SPINE:  1. No evidence of acute trauma to the cervical spine.  2. Multilevel degenerative changes of the cervical spine are present,  with intervertebral disc height loss, facet osteoarthropathy, and  mild spinal canal and neural foraminal stenosis at several levels due  to disc osteophyte complexes, ligamentum flavum thickening and  hypertrophic facet changes.       Assessment/Plan   Right knee hematoma, Status post incision and drainage x 2  2. Knee pain   3. R knee wound  - fell at home down 1 step on 8/17. Found to have R knee hematoma causing lateralization of patella. Underwent I&D by ortho on 8/19   - had an area of bleeding medial to the drainage incisions. it blistered and skin became necrotic. Was readmitted on 9/6 and had I and D on 9/7. The necrotic skin was removed and has a wound vac. Will likely not need surgical wound closure. Has f/up with plastics surgery in 2 weeks.   - is on aspirin 81mg bID for DVT prophylaxis for 26 days and then once daily  - on tylenol 1000mg TID for pain      4. Recurrent falls  5. Physical deconditioning   - falls are likely multifactorial due to cognitive impairment, medication, osteoarthritis  - PT/OT - eval and treat     6. HTN  7. Near syncope  Is on lisinopril 20mg daily. This was held in hospital due to VEDA.  also had a possible near syncopal episode on 9/11 in hospital while working with PT. Had hypotension.  He was discharged back to [facility] on the lisinopril 20mg. Again felt a little lightheaded while walking with PT today. Bps have been relatively low. Will decrease lisinopril to 10mg daily. Also given near syncopal episodes, will decrease donepezil to 5mg daily     8. Dementia- mild CDR 1.0  - gets help from  with most adls. Needs stand by assistance with bathing, mainly due to risk for falls.   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 MRI in 8/2023 showed hippocampal volume at 4%. Follows with both geriatrics and neurology at [facility].  Has been on donepezil 10mg daily. Also was on memantine 10mg BID though it was decreased to just at bedtime.  doesn't think the meds have helped at all for cognition. Also has had weight loss and presyncopal episodes. Will decrease to donepezil to 5mg daily     9. Weight loss  - down 12 lbs between 1/2024 and 6/2024. Has been on donepezil, which may have contributed to the weight loss at least in part. Was started on mirtazapine 7.5mg HS in 6/2024 to help with appetite and sleep. Now on 15mg daily.  notes that it has been helping with sleep. But still has been losing weight. Possibly down to 172 lbs prior to hospitalization. 168 lbs here prior to hospitalization. Will monitor weight weekly. Dietician following. Also will decrease donepezil to 5mg.      10. VEDA on CKD  - cr was up to 2.06 in hospital. Improved to 1.7 on 7/21 and was 1.57 on 9/12. Baseline is around 1.5. will monitor kidney function. Repeat renal panel today showed cr of 1.6. will monitor     11. Anemia  - hemoglobin decreased from 12 to 9.2 during hospitalization. Likely related to hematoma.  Hemoglobin 10.9 yesterday and today. Will monitor.      12. BPH with urination retention   - is on finasteride 5mg daily. And straight catheterizes himself with assistance 4 times daily. Will continue and monitor for urinary retention      13. HL   - is on pravastatin 10mg daily. If he is on this for primary prevention, could consider stopping this     14. GERD  - was pantoprazole 20mg daily. In hospital they increased this to 40mg daily. Not clear why.  Will continue for now though not clear he truly needs this. Could consider weaning off of this as an outpatient     15. Depression   - on escitalopram 10mg daily. Also on  mirtazapine 15mg daily. Was started at 7.5mg HS in 6/2024 to replace quetiapine. Will continue these meds for now.      16. Vitamin D deficiency  - D level was 39.6 in 6/2024. Is on D3 2000 units daily. Will continue     17. Goals of care counseling discussion   Wishes to be DNR-CC-A. [place] form completed today.       Disposition: Will discuss with pt, family, and interdisciplinary team throughout his rehab stay  Pt and  are interested in the house calls program for primary care upon discharge. Will put in a referral.     Tami Marroquin MD      Electronically Signed By: Tami Marroquin MD   9/14/24  6:38 PM

## 2024-09-18 NOTE — PROGRESS NOTES
Subjective :  Patient ID: Gal Cramer is a 84 y.o. male.    History of Present Illness: past medical history of dementia, HTN, HLD, GERD, peripheral neuropathy, and BPH (self-cath 3-4x every day) who presented to the ED 9/6 from the outpatient orthopedic office given c/f recurrent right knee hematoma. Patient was admitted to [facility] 08/17-08/21 following unprovoked mechanical fall down 1 step resulting in R knee hematoma and was taken for I&D of hematoma on 8/19 which has been c/b hematoma recurrence with overlying wound/tissue necrosis. RTOR with orthopedic surgery on 9/7 for debridement of skin and soft tissue of knee down to articular cartilage.     Review of Systems  ROS: All 10 systems were reviewed and are unremarkable except for those mentioned in HPI.     Objective :  Physical Exam  Vitals and nursing note reviewed. Exam conducted with a chaperone present.   Constitutional:       General: She is not in acute distress.     Appearance: She is not ill-appearing.   Eyes:      Extraocular Movements: Extraocular movements intact.      Conjunctiva/sclera: Conjunctivae normal.      Pupils: Pupils are equal, round, and reactive to light.   Cardiovascular:      Rate and Rhythm: Normal rate and regular rhythm.      Pulses: Normal pulses.   Pulmonary:      Effort: Pulmonary effort is normal.      Breath sounds: Normal breath sounds.   Abdominal:      Palpations: Abdomen is soft. There is no mass.      Tenderness: There is no abdominal tenderness.      Hernia: No hernia is present.   Musculoskeletal:         General: No swelling or tenderness.      Cervical back: Normal range of motion and neck supple.   Skin:     Capillary Refill: Capillary refill takes less than 2 seconds.      Coloration: Skin is not jaundiced.      Findings: No bruising or rash.   Neurological:      General: No focal deficit present.      Mental Status: She is oriented to person, place, and time.   Psychiatric:         Mood and Affect: Mood  normal.         Behavior: Behavior normal.         Thought Content: Thought content normal.         Judgment: Judgment normal.     Focused Exam:  Right Knee with medial wound measuring 5.5x 4.5cmx 0.5 cm.   Clean granulation tissue at base without exposed cartidge or bone.   Minimal undermining at wound edges.  Surrounding skin is all normal slight swelling of the right lower extremity    Assessment/Plan :    Discussed surgical options for wound closure. Wound is ready for skin graft. Patient is ready for discharge from [facility] and can start home care. We will order home care for the patient to have home health care.   - Recommend continued wound vac therapy until STSG

## 2024-09-19 ENCOUNTER — NURSING HOME VISIT (OUTPATIENT)
Dept: POST ACUTE CARE | Facility: EXTERNAL LOCATION | Age: 84
End: 2024-09-19
Payer: MEDICARE

## 2024-09-19 DIAGNOSIS — F03.918 DEMENTIA WITH BEHAVIORAL DISTURBANCE (MULTI): ICD-10-CM

## 2024-09-19 DIAGNOSIS — F32.A DEPRESSION, UNSPECIFIED DEPRESSION TYPE: ICD-10-CM

## 2024-09-19 DIAGNOSIS — S81.001D OPEN WOUND OF RIGHT KNEE, SUBSEQUENT ENCOUNTER: ICD-10-CM

## 2024-09-19 DIAGNOSIS — I10 PRIMARY HYPERTENSION: ICD-10-CM

## 2024-09-19 DIAGNOSIS — S80.01XD TRAUMATIC HEMATOMA OF RIGHT KNEE, SUBSEQUENT ENCOUNTER: Primary | ICD-10-CM

## 2024-09-19 DIAGNOSIS — R29.6 RECURRENT FALLS: ICD-10-CM

## 2024-09-19 DIAGNOSIS — R53.81 PHYSICAL DECONDITIONING: ICD-10-CM

## 2024-09-19 DIAGNOSIS — N18.9 CHRONIC KIDNEY DISEASE, UNSPECIFIED CKD STAGE: ICD-10-CM

## 2024-09-19 DIAGNOSIS — R63.4 WEIGHT LOSS: ICD-10-CM

## 2024-09-19 PROCEDURE — 99308 SBSQ NF CARE LOW MDM 20: CPT | Performed by: NURSE PRACTITIONER

## 2024-09-19 ASSESSMENT — ENCOUNTER SYMPTOMS
DIFFICULTY URINATING: 1
ARTHRALGIAS: 0
CONSTIPATION: 0
SHORTNESS OF BREATH: 0
ABDOMINAL PAIN: 0

## 2024-09-19 NOTE — LETTER
Patient: Gal Cramer  : 1940    Encounter Date: 2024    Reason for visit: fu R knee wound    Subjective  HPI:Mr. Cramer is a 84 y.o. male with a hx of dementia, HTN, HLD, CKD 3, GERD, peripheral neuropathy, and BPH (-cath 3-4x every day)  who presented to the ED  from outpatient orthopedic office due to concern for recurrent right knee hematoma, admitted to Medicine  for elly-operative management.   Per chart review, admitted to [facility] - following unprovoked mechanical fall down 1 step resulting in R Knee hematoma which rapidly increased in size with a hemorrhagic bullae on the anteromedial portion of the R knee, causing lateralization of the patella. Patient evaluated by orthopedic surgery for hematoma management, and taken for I&D of hematoma on . Postoperatively, patient was doing well and endorsed minimal pain. DVT PPx was restarted with ASA 81 BID on POD1, and hemovac drain removed on . Discharged in stable condition to SNF.   Saw Orthopedic PA  for post-op check. At this time patient with increased swelling in right knee. Also with blister over the medial knee that popped a few days ago. Since blister has opened he has had a large wound to the medial knee. This has been managed with daily Xeroform dressings at SNF. Per Ortho appears that shortly after discharge patient had recurrence of his hematoma which lead to a swelling blister that has since popped. The area of previous blistering is quite large and has eschar forming. Discussed with on-call surgeon and recommenced ER visit for admission for repeat I&D. Per documentation he could potentially also need plastics consult for wound management due to concern for skin viability.   On interview, patient is relatively asx. He denies pain to the knee. Per , reports that patient has had increased swelling and erythema surrounding the knee despite daily dressing changes and ACE wrap placement. Patient  "overtly denies fevers/chill and purulent drainage. He does report RLE tenderness to palpation. He denies chest pain and SOB. He has noted increased warmth to RLE.  On initial assessment pt was afebrile, HDS, with O2 sats appropriate on RA. Overall concerning for re-accumulation of right knee hematoma with overlying ulcer noted to be a ruptured blister. SIRS negative on admission, Blood cultures pending start IV Abx if positive. Successful I&D with ortho 9/7, intra-op cx pending. Pain has been controlled and patient allowed to weightbearing as tolerated. Pt has wound vac placed pending plastic surgery closure, Plastic Surgery recs: Plastic Surgery will Evaluate to decide on definitve vac management vs local advancement flap for tissue coverage. Holding lisinopril until after plastic surgery, if applicable. ANCEF completed 24 hours after surgery on 9/8.   Also, high clinical concern for RLE DVT at this time. Continuing home ASA for dvt ppx. DVT ultrasound not concerning for DVT 9/8. PT/OT following rec mod intensity. Plastic surgery with recommendations to allow wound to heal by secondary intention and follow up in 2 weeks.  Patient with episode of orthostatic hypotension without syncope 9/11 likely iso of dehydration. Patient responded well to 1L LR with no orthostatic hypotension morning of 9/12. Patient's clark removed upon discharge as he can straight cath and requests this. Discharged in stable condition to SNF with plans for plastic surgery and PCP follow up. \"    Seen today on unit for fu R knee wound, wound vac is intact and minimal drainage residue in tubing. Pt denies pain. No hematuria with ISC.     Review of Systems   Respiratory:  Negative for shortness of breath.    Cardiovascular:  Negative for chest pain and leg swelling.   Gastrointestinal:  Negative for abdominal pain and constipation.   Genitourinary:  Positive for difficulty urinating.        ISC use    Musculoskeletal:  Negative for arthralgias " and gait problem.        Dec rom R knee       Objective  VS: reviewed in point click care     Physical Exam  Vitals reviewed.   Constitutional:       Appearance: He is normal weight.   HENT:      Mouth/Throat:      Mouth: Mucous membranes are moist.   Abdominal:      General: Abdomen is flat.   Musculoskeletal:      Right knee: Decreased range of motion.      Comments: Wound vac R knee, intact. Suture line well approx, no drainage    Skin:     Capillary Refill: Capillary refill takes less than 2 seconds.   Neurological:      Mental Status: He is alert. Mental status is at baseline.   Psychiatric:         Mood and Affect: Mood normal.         Assessment/Plan    Right knee hematoma, Status post incision and drainage x 2  2. Knee pain   3. R knee wound  - fell at home down 1 step on 8/17. Found to have R knee hematoma causing lateralization of patella. Underwent I&D by ortho on 8/19   - had an area of bleeding medial to the drainage incisions. it blistered and skin became necrotic. Was readmitted on 9/6 and had I and D on 9/7. The necrotic skin was removed and has a wound vac. Will likely not need surgical wound closure. Has f/up with plastics surgery in 2 weeks.   - is on aspirin 81mg bID for DVT prophylaxis for 26 days and then once daily  - on tylenol 1000mg TID for pain      4. Recurrent falls  5. Physical deconditioning   - falls are likely multifactorial due to cognitive impairment, medication, osteoarthritis  - PT/OT - eval and treat     6. HTN  7. Near syncope  Is on lisinopril 20mg daily. This was held in hospital due to VEDA.  also had a possible near syncopal episode on 9/11 in hospital while working with PT. Had hypotension.  He was discharged back to [facility] on the lisinopril 20mg. Again felt a little lightheaded while walking with PT today. Bps have been relatively low. Will decrease lisinopril to 10mg daily. Also given near syncopal episodes, will decrease donepezil to 5mg daily  - stable bp today      8.  Dementia- mild CDR 1.0  - gets help from  with most adls. Needs stand by assistance with bathing, mainly due to risk for falls.   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 MRI in 8/2023 showed hippocampal volume at 4%. Follows with both geriatrics and neurology at [facility].  Has been on donepezil 10mg daily. Also was on memantine 10mg BID though it was decreased to just at bedtime.  doesn't think the meds have helped at all for cognition. Also has had weight loss and presyncopal episodes. Will decrease to donepezil to 5mg daily     9. Weight loss  - down 12 lbs between 1/2024 and 6/2024. Has been on donepezil, which may have contributed to the weight loss at least in part. Was started on mirtazapine 7.5mg HS in 6/2024 to help with appetite and sleep. Now on 15mg daily.  notes that it has been helping with sleep. But still has been losing weight. Possibly down to 172 lbs prior to hospitalization. 168 lbs here prior to hospitalization. Will monitor weight weekly. Dietician following. Also will decrease donepezil to 5mg.      10. VEDA on CKD  - cr was up to 2.06 in hospital. Improved to 1.7 on 7/21 and was 1.57 on 9/12. Baseline is around 1.5. will monitor kidney function. Repeat renal panel today showed cr of 1.6. will monitor     11. Anemia  - hemoglobin decreased from 12 to 9.2 during hospitalization. Likely related to hematoma.  Hemoglobin 10.9 yesterday and today. Will monitor.      12. BPH with urination retention   - is on finasteride 5mg daily. And straight catheterizes himself with assistance 4 times daily. Will continue and monitor for urinary retention      13. HL   - is on pravastatin 10mg daily. If he is on this for primary prevention, could consider stopping this     14. GERD  - was pantoprazole 20mg daily. In hospital they increased this to 40mg daily. Not clear why.   Will continue for now though not clear he truly needs this. Could consider weaning off of this as an outpatient     15. Depression   - on escitalopram 10mg daily. Also on mirtazapine 15mg daily. Was started at 7.5mg HS in 6/2024 to replace quetiapine. Will continue these meds for now.      16. Vitamin D deficiency  - D level was 39.6 in 6/2024. Is on D3 2000 units daily. Will continue     Med review  - reviewed in [facility]    Code status  [place] code status: DNCA    Dispo: stable, progressing with therapy, no dc date yet.     DARLENE Vivar  09/19/24    Electronically Signed By: DARLENE Vivar   9/19/24  4:13 PM

## 2024-09-19 NOTE — PROGRESS NOTES
Reason for visit: fu R knee wound    Subjective   HPI:Mr. Cramer is a 84 y.o. male with a hx of dementia, HTN, HLD, CKD 3, GERD, peripheral neuropathy, and BPH (-cath 3-4x every day)  who presented to the ED 9/6 from outpatient orthopedic office due to concern for recurrent right knee hematoma, admitted to Medicine 9/7 for elly-operative management.   Per chart review, admitted to [facility] 08/17-08/21 following unprovoked mechanical fall down 1 step resulting in R Knee hematoma which rapidly increased in size with a hemorrhagic bullae on the anteromedial portion of the R knee, causing lateralization of the patella. Patient evaluated by orthopedic surgery for hematoma management, and taken for I&D of hematoma on 8/19. Postoperatively, patient was doing well and endorsed minimal pain. DVT PPx was restarted with ASA 81 BID on POD1, and hemovac drain removed on 8/21. Discharged in stable condition to SNF.   Saw Orthopedic PA 09/06 for post-op check. At this time patient with increased swelling in right knee. Also with blister over the medial knee that popped a few days ago. Since blister has opened he has had a large wound to the medial knee. This has been managed with daily Xeroform dressings at SNF. Per Ortho appears that shortly after discharge patient had recurrence of his hematoma which lead to a swelling blister that has since popped. The area of previous blistering is quite large and has eschar forming. Discussed with on-call surgeon and recommenced ER visit for admission for repeat I&D. Per documentation he could potentially also need plastics consult for wound management due to concern for skin viability.   On interview, patient is relatively asx. He denies pain to the knee. Per , reports that patient has had increased swelling and erythema surrounding the knee despite daily dressing changes and ACE wrap placement. Patient overtly denies fevers/chill and purulent drainage. He does report RLE  "tenderness to palpation. He denies chest pain and SOB. He has noted increased warmth to RLE.  On initial assessment pt was afebrile, HDS, with O2 sats appropriate on RA. Overall concerning for re-accumulation of right knee hematoma with overlying ulcer noted to be a ruptured blister. SIRS negative on admission, Blood cultures pending start IV Abx if positive. Successful I&D with ortho 9/7, intra-op cx pending. Pain has been controlled and patient allowed to weightbearing as tolerated. Pt has wound vac placed pending plastic surgery closure, Plastic Surgery recs: Plastic Surgery will Evaluate to decide on definitve vac management vs local advancement flap for tissue coverage. Holding lisinopril until after plastic surgery, if applicable. ANCEF completed 24 hours after surgery on 9/8.   Also, high clinical concern for RLE DVT at this time. Continuing home ASA for dvt ppx. DVT ultrasound not concerning for DVT 9/8. PT/OT following rec mod intensity. Plastic surgery with recommendations to allow wound to heal by secondary intention and follow up in 2 weeks.  Patient with episode of orthostatic hypotension without syncope 9/11 likely iso of dehydration. Patient responded well to 1L LR with no orthostatic hypotension morning of 9/12. Patient's clark removed upon discharge as he can straight cath and requests this. Discharged in stable condition to SNF with plans for plastic surgery and PCP follow up. \"    Seen today on unit for fu R knee wound, wound vac is intact and minimal drainage residue in tubing. Pt denies pain. No hematuria with ISC.     Review of Systems   Respiratory:  Negative for shortness of breath.    Cardiovascular:  Negative for chest pain and leg swelling.   Gastrointestinal:  Negative for abdominal pain and constipation.   Genitourinary:  Positive for difficulty urinating.        ISC use    Musculoskeletal:  Negative for arthralgias and gait problem.        Dec rom R knee       Objective   VS: reviewed " in point click care     Physical Exam  Vitals reviewed.   Constitutional:       Appearance: He is normal weight.   HENT:      Mouth/Throat:      Mouth: Mucous membranes are moist.   Abdominal:      General: Abdomen is flat.   Musculoskeletal:      Right knee: Decreased range of motion.      Comments: Wound vac R knee, intact. Suture line well approx, no drainage    Skin:     Capillary Refill: Capillary refill takes less than 2 seconds.   Neurological:      Mental Status: He is alert. Mental status is at baseline.   Psychiatric:         Mood and Affect: Mood normal.         Assessment/Plan     Right knee hematoma, Status post incision and drainage x 2  2. Knee pain   3. R knee wound  - fell at home down 1 step on 8/17. Found to have R knee hematoma causing lateralization of patella. Underwent I&D by ortho on 8/19   - had an area of bleeding medial to the drainage incisions. it blistered and skin became necrotic. Was readmitted on 9/6 and had I and D on 9/7. The necrotic skin was removed and has a wound vac. Will likely not need surgical wound closure. Has f/up with plastics surgery in 2 weeks.   - is on aspirin 81mg bID for DVT prophylaxis for 26 days and then once daily  - on tylenol 1000mg TID for pain      4. Recurrent falls  5. Physical deconditioning   - falls are likely multifactorial due to cognitive impairment, medication, osteoarthritis  - PT/OT - eval and treat     6. HTN  7. Near syncope  Is on lisinopril 20mg daily. This was held in hospital due to VEDA.  also had a possible near syncopal episode on 9/11 in hospital while working with PT. Had hypotension.  He was discharged back to [facility] on the lisinopril 20mg. Again felt a little lightheaded while walking with PT today. Bps have been relatively low. Will decrease lisinopril to 10mg daily. Also given near syncopal episodes, will decrease donepezil to 5mg daily  - stable bp today      8. Dementia- mild CDR 1.0  - gets help from  with most adls.  Needs stand by assistance with bathing, mainly due to risk for falls.   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 MRI in 8/2023 showed hippocampal volume at 4%. Follows with both geriatrics and neurology at [facility].  Has been on donepezil 10mg daily. Also was on memantine 10mg BID though it was decreased to just at bedtime.  doesn't think the meds have helped at all for cognition. Also has had weight loss and presyncopal episodes. Will decrease to donepezil to 5mg daily     9. Weight loss  - down 12 lbs between 1/2024 and 6/2024. Has been on donepezil, which may have contributed to the weight loss at least in part. Was started on mirtazapine 7.5mg HS in 6/2024 to help with appetite and sleep. Now on 15mg daily.  notes that it has been helping with sleep. But still has been losing weight. Possibly down to 172 lbs prior to hospitalization. 168 lbs here prior to hospitalization. Will monitor weight weekly. Dietician following. Also will decrease donepezil to 5mg.      10. VEDA on CKD  - cr was up to 2.06 in hospital. Improved to 1.7 on 7/21 and was 1.57 on 9/12. Baseline is around 1.5. will monitor kidney function. Repeat renal panel today showed cr of 1.6. will monitor     11. Anemia  - hemoglobin decreased from 12 to 9.2 during hospitalization. Likely related to hematoma.  Hemoglobin 10.9 yesterday and today. Will monitor.      12. BPH with urination retention   - is on finasteride 5mg daily. And straight catheterizes himself with assistance 4 times daily. Will continue and monitor for urinary retention      13. HL   - is on pravastatin 10mg daily. If he is on this for primary prevention, could consider stopping this     14. GERD  - was pantoprazole 20mg daily. In hospital they increased this to 40mg daily. Not clear why.  Will continue for now though not clear he truly needs this. Could  consider weaning off of this as an outpatient     15. Depression   - on escitalopram 10mg daily. Also on mirtazapine 15mg daily. Was started at 7.5mg HS in 6/2024 to replace quetiapine. Will continue these meds for now.      16. Vitamin D deficiency  - D level was 39.6 in 6/2024. Is on D3 2000 units daily. Will continue     Med review  - reviewed in [facility]    Code status  [place] code status: DNRCCA    Dispo: stable, progressing with therapy, no dc date yet.     Missy Gray, CHOCO-CNP  09/19/24

## 2024-09-23 ENCOUNTER — APPOINTMENT (OUTPATIENT)
Dept: PLASTIC SURGERY | Facility: CLINIC | Age: 84
End: 2024-09-23
Payer: MEDICARE

## 2024-09-24 ENCOUNTER — APPOINTMENT (OUTPATIENT)
Dept: PRIMARY CARE | Facility: CLINIC | Age: 84
End: 2024-09-24
Payer: MEDICARE

## 2024-09-25 ENCOUNTER — APPOINTMENT (OUTPATIENT)
Dept: PRIMARY CARE | Facility: CLINIC | Age: 84
End: 2024-09-25
Payer: MEDICARE

## 2024-09-30 ENCOUNTER — NURSING HOME VISIT (OUTPATIENT)
Dept: POST ACUTE CARE | Facility: EXTERNAL LOCATION | Age: 84
End: 2024-09-30
Payer: MEDICARE

## 2024-09-30 ENCOUNTER — APPOINTMENT (OUTPATIENT)
Dept: PLASTIC SURGERY | Facility: CLINIC | Age: 84
End: 2024-09-30
Payer: MEDICARE

## 2024-09-30 VITALS
WEIGHT: 163 LBS | DIASTOLIC BLOOD PRESSURE: 72 MMHG | TEMPERATURE: 97.2 F | HEIGHT: 69 IN | OXYGEN SATURATION: 95 % | HEART RATE: 69 BPM | BODY MASS INDEX: 24.14 KG/M2 | SYSTOLIC BLOOD PRESSURE: 123 MMHG

## 2024-09-30 DIAGNOSIS — E55.9 VITAMIN D DEFICIENCY: ICD-10-CM

## 2024-09-30 DIAGNOSIS — E78.5 HYPERLIPIDEMIA, UNSPECIFIED HYPERLIPIDEMIA TYPE: ICD-10-CM

## 2024-09-30 DIAGNOSIS — N18.9 CHRONIC KIDNEY DISEASE, UNSPECIFIED CKD STAGE: ICD-10-CM

## 2024-09-30 DIAGNOSIS — S81.001D OPEN WOUND OF RIGHT KNEE, SUBSEQUENT ENCOUNTER: ICD-10-CM

## 2024-09-30 DIAGNOSIS — D64.9 ANEMIA, UNSPECIFIED TYPE: ICD-10-CM

## 2024-09-30 DIAGNOSIS — F32.A DEPRESSION, UNSPECIFIED DEPRESSION TYPE: ICD-10-CM

## 2024-09-30 DIAGNOSIS — M54.50 ACUTE LOW BACK PAIN, UNSPECIFIED BACK PAIN LATERALITY, UNSPECIFIED WHETHER SCIATICA PRESENT: ICD-10-CM

## 2024-09-30 DIAGNOSIS — R29.6 RECURRENT FALLS: ICD-10-CM

## 2024-09-30 DIAGNOSIS — F03.918 DEMENTIA WITH BEHAVIORAL DISTURBANCE (MULTI): ICD-10-CM

## 2024-09-30 DIAGNOSIS — R39.14 BENIGN PROSTATIC HYPERPLASIA WITH INCOMPLETE BLADDER EMPTYING: ICD-10-CM

## 2024-09-30 DIAGNOSIS — I10 PRIMARY HYPERTENSION: ICD-10-CM

## 2024-09-30 DIAGNOSIS — N30.01 ACUTE CYSTITIS WITH HEMATURIA: ICD-10-CM

## 2024-09-30 DIAGNOSIS — Z51.89 ENCOUNTER FOR WOUND CARE: Primary | ICD-10-CM

## 2024-09-30 DIAGNOSIS — R31.0 GROSS HEMATURIA: Primary | ICD-10-CM

## 2024-09-30 DIAGNOSIS — R63.4 WEIGHT LOSS: ICD-10-CM

## 2024-09-30 DIAGNOSIS — R53.81 PHYSICAL DECONDITIONING: ICD-10-CM

## 2024-09-30 DIAGNOSIS — N40.1 BENIGN PROSTATIC HYPERPLASIA WITH INCOMPLETE BLADDER EMPTYING: ICD-10-CM

## 2024-09-30 DIAGNOSIS — Z71.89 GOALS OF CARE, COUNSELING/DISCUSSION: ICD-10-CM

## 2024-09-30 DIAGNOSIS — Z98.890 STATUS POST INCISION AND DRAINAGE: ICD-10-CM

## 2024-09-30 DIAGNOSIS — S80.01XD TRAUMATIC HEMATOMA OF RIGHT KNEE, SUBSEQUENT ENCOUNTER: ICD-10-CM

## 2024-09-30 PROCEDURE — 3078F DIAST BP <80 MM HG: CPT

## 2024-09-30 PROCEDURE — 1159F MED LIST DOCD IN RCRD: CPT

## 2024-09-30 PROCEDURE — 99306 1ST NF CARE HIGH MDM 50: CPT | Performed by: INTERNAL MEDICINE

## 2024-09-30 PROCEDURE — 99213 OFFICE O/P EST LOW 20 MIN: CPT

## 2024-09-30 PROCEDURE — 1111F DSCHRG MED/CURRENT MED MERGE: CPT

## 2024-09-30 PROCEDURE — 3074F SYST BP LT 130 MM HG: CPT

## 2024-09-30 PROCEDURE — 1036F TOBACCO NON-USER: CPT

## 2024-09-30 PROCEDURE — 1126F AMNT PAIN NOTED NONE PRSNT: CPT

## 2024-09-30 ASSESSMENT — PAIN SCALES - GENERAL: PAINLEVEL: 0-NO PAIN

## 2024-09-30 ASSESSMENT — ENCOUNTER SYMPTOMS
DEPRESSION: 0
OCCASIONAL FEELINGS OF UNSTEADINESS: 0
LOSS OF SENSATION IN FEET: 0

## 2024-09-30 ASSESSMENT — PATIENT HEALTH QUESTIONNAIRE - PHQ9
2. FEELING DOWN, DEPRESSED OR HOPELESS: NOT AT ALL
SUM OF ALL RESPONSES TO PHQ9 QUESTIONS 1 & 2: 0
1. LITTLE INTEREST OR PLEASURE IN DOING THINGS: NOT AT ALL

## 2024-09-30 NOTE — PROGRESS NOTES
Reason for admission to [facility] for post-acute rehab UTI and physical deconditioning    Subjective   Mr. Cramer 84 y.o. year old male was re-admitted to [facility] unit on 9/28. Patient is seen for the re-initial SNF visit on 9/30. He has a past medical history of dementia, weight loss, HTN, CKD, HLD, GERD, peripheral neuropathy, and BPH (self-cath 3-4x every day).    Was recently admitted 8/21 after hospital stay (8/17-8/21) for fall down 1 step and R knee hematoma s/p I&D on 8/19. He was admitted to  for post-acute rehab for deconditioning on 8/21. But then was readmitted to the hospital on 9/6 directly from an ortho follow up office visit.   Had rehospitalization 9/6- 9/12/24 for traumatic hematoma of R knee, status post incision and drainage on 9/7. Plastics placed a wound vac on the open surgical wound. He had an episode of syncope/near syncope on 9/11. Given 1L fluid. Readmitted to [facility] on 9/12. He was doing really well with therapy. Was walking without a device and 1 person stand by assist. And we were getting ready to discharge him over the next couple of days. But on 9/25 he started having large quantity tiff bleeding after self straight catheterization. Bleeding lasted for 10 mins. He felt lightheaded and was nauseated. He was sent back out to the hospital.       Admitted from: acute care hospital  Facility: [facility]  Dates of hospitalization: 9/25-9/28/24  Hospital Course:  Mr. Cramer is an 83 y/o man with h/o dementia, HTN, GERD, CKD stage 3, neurogenic bladder for the past 12 years after a spermatocele surgery now straight caths himself 3-4 times per day.   He is currently at [facility] rehab after a traumatic hematoma in the right knee s/p evacuation and then leading to wound healing issues that are being taken care of at . Has a wound VAC in place. Per patient and , the wound looks clean and much improved.   He presented to the ER with bleeding from the  urethra after straight cathing. Per  has happened before usually trauma from the catheter or a UTI. This time his UA is +ve and pt was started on Rocephin in the ER.   There was a concern for anemia and VEDA but looking at his old records and recent  admit, this seems to be within baseline range. Admitted to Medicine Service for further monitoring.  # Hematuria (POA: Yes)  # BPH with urination retention   -H/o neurogenic bladder/BPH  -Pt on straight cath schedule at home done by /patient  - agreeable to placing clark 9/8 to reduce issues with delayed straight caths, pt forgets to cath himself as his  does it for him multiple times a day at home   -UA consistent with acute cystitis with hematuria  (Prior urine cultures Enterobacter) with encephalopathy.   Plan  -Got three doses of ceftriaxone   -Follow cultures-> klebsiella, Susceptible to cipro   -complete 4 more days of antibiotics with PO cipro   -QTc stable   -cont intermittent straight cath   -continue with home finasteride  #CKD3/HTN  seems to be at baseline SCr. No VEDA  - trend BMP PRN  - Continue Lisinopril 20 mg po qday  #Dementia  Continue Memantine once per day and Aricept. Please see Geriatrics notes from June. Per Dr. Banks was going to d/c Memantine but pt still on it.   -D/c Seroquel- pt not on it at home.   -cont Mirtazepine 7.5 mg qhs   -Not on Lexapro per   # Right knee hematoma, Status post incision and drainage x 2  # Knee pain   # R knee wound  # pressure injury to coccyx (POA)  - fell at home down 1 step on 8/17. Found to have R knee hematoma causing lateralization of patella. Underwent I&D by ortho on 8/19   - had an area of bleeding medial to the drainage incisions. it blistered and skin became necrotic. Was readmitted on 9/6 and had I and D on 9/7. The necrotic skin was removed and has a wound vac. Will likely not need surgical wound closure. Has f/up with plastics surgery in 2 weeks.   Plan  - is on  "aspirin 81mg daily for DVT prophylaxis   - on tylenol 1000mg TID for pain   - plan to return to rehab for PT and wound care  - wound care consulted to assess wound vac  - Wound vac to be changed every 3 days, due today   - continue OP follow up with    # Anemia  - hemoglobin decreased from 12 to 9.2 during last hospitalization. Likely related to hematoma.   - now stable   Plan   -trend CBC PRN   # Depression   - cont escitalopram 10mg daily and mirtazapine 15mg daily.   # Vitamin D deficiency  - D level was 39.6 in 6/2024. Is on D3 2000 units daily. Will continue \"    Primary caregiver: patient and   : Wes Cavazos,            [phone]     PCP: Fitz Power MD    HPI     Per patient:  - complaining of back pain when walking with rolator today  - bowels moving  - no pain while sitting    Per   - pt has appt with PCP tomorrow. Hsuband wondering if they need to keep it  - he is interested in switching care to  geriatrics at [facility]  - he feels he could benefit from savvy caregiver program for caregiver burden. He has been feeling all alone in caring for him  - he will hire a caregiver to help with pt once back home  - the appointment with plastic surgery went well. wound healing. May still need a skin flap down.   - will still need to go home with the wound vac but it can be changed every 5 days.     Living environment prior to admission: condo with his . No stairs     Assistive Devices: straight cane     ADLS prior to admission   Is dependant or requires assistance in the following BADL: was hesitant to bathe at home. But was using a tub shower. And maybe was afraid of falling. Needs stand by assistance with bathing. Dresses on his own  Is dependant or requires assistance in the following Instrumental ADL: most     Advanced Directives on file: wishes to be DNR-Comfort care -arrest     Medications reviewed and reconciled.     Objective     Physical Exam  Vitals: " "144/88, 97F, 78, 18, 165.4 on 9/23/24 and 168.0 on 8/26  Constitutional:  Well-nourished, kempt  Head: NC/AT  Eyes: no scleral injection or erythema    Dentition: fair    Oropharyx: clear    Neck: supple. trachea midline. Thyroid not enlarged  Lymphatics: No adenopathy at neck  Respiratory: clear without rales, rhonchi or wheezes  Cardiovascular: RRR, +S1, S2, no murmurs appreciatedc    Extremeties:  1+ edema of R leg and trace in L leg. No clubbing, cyanosis  Gastrointestinal: +BS, soft, nontender.  Genitourinary: no clark  Integumentary: No ulcers, pressure sores, rash. Wound vac dressing in place over medial R knee.  Musculoskeletal: some resolving bruising on R thigh    Contractures: none    Muscle bulk: nl    Digits/nails: nl    Effusions: none   Neurologic: alert.     Get up and go: not observed  Psychologic: affect full     9/28/24  \"  Ref Range & Units 2 d ago   WBC  3.70 - 11.00 k/uL 5.39   Hemoglobin  13.0 - 17.0 g/dL 10.1 Low    Hematocrit  39.0 - 51.0 % 29.5 Low    MCV  80.0 - 100.0 fL 95.8   RDW-CV  11.5 - 15.0 % 11.9   Platelet Count  150 - 400 k/uL 59 Low      Component  Ref Range & Units 2 d ago   Glucose  74 - 99 mg/dL 98   BUN  9 - 24 mg/dL 28 High    Creatinine  0.73 - 1.22 mg/dL 1.76 High    Sodium  136 - 144 mmol/L 141   Potassium  3.7 - 5.1 mmol/L 4.8   Chloride  98 - 107 mmol/L 105   CO2  22 - 30 mmol/L 25   Anion Gap  8 - 15 mmol/L 11   Calcium, Total  8.5 - 10.2 mg/dL 9.3   Estimated Glomerular Filtration Rate  >=60 mL/min/1.73m² 37 Low      Component  Ref Range & Units 5 d ago   Color  Yellow Yellow   Clarity  Clear Cloudy Abnormal    Glucose, Urine  Negative Negative   Bilirubin, Urine  Negative Negative   Ketones, Urine  Negative Trace Abnormal    Specific Gravity, Ur  1.005 - 1.030 1.022   Hemoglobin/Blood,Ur  Negative 2+ Abnormal    pH, Urine  <8.5 5.5   Protein, Urine  Negative 1+ Abnormal    Urobilinogen  0.2-1.0 EU/dL 0.2 EU/dL   Nitrites  Negative Positive Abnormal    Leuk " Esterase  Negative 2+ Abnormal    WBC, Urine  0-5 /HPF >20 /HPF Abnormal    RBC, Urine  0-2 /HPF >20 /HPF Abnormal    Bacteria uL  Negative uL >9,821 High    Squamous Epithelial Cells  /HPF None Seen   Non-Squamous Epithelial Cells  None Seen /HPF Moderate Abnormal    Casts, Hyaline  0 /LPF 1-3 /LPF Abnormal    9/25/24  Component  Ref Range & Units 5 d ago   Glucose  74 - 99 mg/dL 125 High    Comment: The American Diabetes Association (ADA) provides guidance for cutoff values for fasting glucose and random glucose. The ADA defines fasting as no caloric intake for at least 8 hours. Fasting plasma glucose results between 100 to 125 mg/dL indicate increased risk for diabetes (prediabetes).  Fasting plasma glucose results greater than or equal to 126 mg/dL meet the criteria for diagnosis of diabetes. In the absence of unequivocal hyperglycemia, results should be confirmed by repeat testing. In a patient with classic symptoms of hyperglycemia or hyperglycemic crisis, random plasma glucose results greater than or equal to 200 mg/dL meet the criteria for diagnosis of diabetes.  Reference: Standards of Medical Care in Diabetes 2016, American Diabetes Association. Diabetes Care. 2016.39(Suppl 1).   BUN  9 - 24 mg/dL 34 High    Creatinine  0.73 - 1.22 mg/dL 1.84 High    Sodium  136 - 144 mmol/L 140   Potassium  3.7 - 5.1 mmol/L 4.2   Chloride  98 - 107 mmol/L 105   CO2  22 - 30 mmol/L 22   Anion Gap  8 - 15 mmol/L 13   Calcium, Total  8.5 - 10.2 mg/dL 9.2   Estimated Glomerular Filtration Rate  >=60 mL/min/1.73m² 35 Low    CT head wo IV contrast 08/17/2024  CT maxillofacial bones wo IV contrast 08/17/2024  CT cervical spine wo IV contrast 08/17/2024  CT HEAD:  No hyperdense intracranial hemorrhage is identified. There is no mass  effect or midline shift.  Gray-white differentiation is intact, without evidence of CT apparent  transcortical infarct, although patchy attenuation changes are  present in the periventricular and  subcortical white matter of  bilateral cerebral hemispheres, nonspecific findings favored to  represent sequela of microvascular disease.  No abnormal ventricular dilatation is present. Basal cisterns are  patent. No extra-axial fluid collections are identified.  Scalp soft tissues do not demonstrate any acute abnormality.  Calvarium is unremarkable without evidence of skull fracture.  Mastoid air cells and middle ear cavities are clear.  CT FACIAL BONES:  Bony orbits are intact, without evidence of fracture. Periorbital  soft tissues and intraorbital structures are symmetric in appearance  without evidence of acute trauma. Patient is status post cataract  extraction bilaterally.  No acute facial bone fracture is identified. Nasal bones are  unremarkable in appearance.  Paranasal sinuses are well aerated without evidence of air-fluid  levels.  Some soft tissue swelling overlies the chin, without evidence of  underlying osseous injury. No associated fluid collection or soft  tissue gas is present.Although evaluation of the oral cavity is degraded by beam hardening artifact no definite evidence of acute trauma to the oral cavity is present. Temporomandibular joints are intact, with asymmetric  degenerative changes present in the right.  Large periapical lucency surrounds the roots of the 2nd right  maxillary molar.  Parotid and submandibular glands are symmetric in appearance and  otherwise unremarkable.  CT C-SPINE:  There is straightening of normal lordotic curvature of the cervical  spine, without evidence of significant spondylolisthesis.  Cervical vertebral body heights are preserved without evidence of  compression fractures, although some insufficiency endplate changes  with smaller Schmorl's nodes are present at several levels. There is  no evidence of acute trauma to the posterior elements of the cervical  spine.  Craniocervical junction is intact, although degenerative changes at  the atlantoaxial  articulation with hypertrophic pannus extending into  the anterior epidural space of C1 contributing to mild spinal canal  narrowing at this level with the effacement of the anterior  subarachnoid space.  Facet joints are preserved without evidence of traumatic subluxation  or perching, although multilevel degenerate facet osteoarthropathy is  evident, most pronounced at C2-C3 and C3-C4 on the left.  Multilevel intervertebral disc height loss is present, moderate to  severe at C4-C5 and C5-C6 and mild at other levels.  No high-grade stenosis is identified in the cervical spine, although  mild spinal canal narrowing is suspected at C3-C4, C4-C5 and C5-C6  due to disc osteophyte complexes and ligamentum flavum thickening.  Mild spinal canal narrowing is present at C3-C4 bilaterally and C4-C5  on the left due to uncovertebral and facet joint hypertrophy.  Prevertebral and paraspinal soft tissues do not demonstrate any acute  abnormalities. Included lung apices are clear.  Impression  CT HEAD:  1. No evidence of hemorrhage, skull fracture, or other acute  intracranial trauma/abnormality.  2. Patchy attenuation changes are present in the periventricular and  subcortical white matter of bilateral cerebral hemispheres,  nonspecific findings favored to represent sequela of microvascular  disease.  CT FACIAL BONES:  1. Soft tissue swelling overlies the chin, likely posttraumatic  without absence of the underlying osseous injury. No facial or  orbital bone fracture is present.  2. Large periapical lucency surrounds the roots of the 2nd right  mandibular molar, correlate with dental exam.  CT C-SPINE:  1. No evidence of acute trauma to the cervical spine.  2. Multilevel degenerative changes of the cervical spine are present,  with intervertebral disc height loss, facet osteoarthropathy, and  mild spinal canal and neural foraminal stenosis at several levels due  to disc osteophyte complexes, ligamentum flavum thickening  and  hypertrophic facet changes.    Assessment/Plan    Hematuria  UTI  Urinary retention  - had rehospitalization 9/25-9/28 for tiff hematuria that occurred at 7pm on 9/25 at [facility] after straight catheterization. Bleeding stopped after 10 minutes. UA in hospital positive and urine culture grew klebsiella. Was given ceftriaxone for 3 days and then switched to oral cipro for 4 more days. Will complete this course on 10/1. Is n finasteride 5mg daily. And straight catheterizes himself with assistance 4 times daily. Will continue and monitor for urinary retention     Anemia  - hemoglobin decreased from 12 to 9.2 during hospitalization in early september. However during this most recent hospitalization, hemoglobin was stable around 10. Will repeat cbc tomorrow and monitor     5. CKD  - cr was up to 2.06 in hospital in early sept. Improved to 1.57 on 9/12. Baseline is around 1.5-1.7. Cr was 1.84 on admission to hospital 9/25 and was 1.76 on discharge 9/28. Will repeat renal panel tomorrow    6. Right knee hematoma, Status post incision and drainage x 2  7. R knee wound  - fell at home down 1 step on 8/17. Found to have R knee hematoma causing lateralization of patella. Underwent I&D by ortho on 8/19   - had an area of bleeding medial to the drainage incisions. it blistered and skin became necrotic. Was readmitted on 9/6 and had I and D on 9/7. The necrotic skin was removed and has a wound vac. Will likely not need surgical wound closure.   - has follow up visit with plastics surgery clinic today  Had been on aspirin 81mg bID for DVT prophylaxis for 26 days and then once daily  - on tylenol 1000mg TID for pain      8. Recurrent falls  9. Physical deconditioning   - falls are likely multifactorial due to cognitive impairment, medication, osteoarthritis  - continue PT/OT - eval and treat     10. HTN  11. Near syncope  Is on lisinopril 20mg daily. This was held in hospital in early sept due to VEDA.  also had a possible  near syncopal episode on 9/11 in hospital while working with PT. Had hypotension. Due to relatively low bps, I lowered lisinopril to 10mg prior to most recent hospitalization. But the hospital did not reconcile meds from jud and had been back on 20mg of lisinopril. Bps now have been 130s-150s systolic. Will continue lisinopril 20mg daily for now. Also of note, I had decreased donepezil to 5mg prior to most recent hospitalization due to ongoing weight loss and also pre-syncope. He again was put back on 10mg by hospital. Now back on 5mg here. Will wean off of donepezil.      12. Dementia- mild CDR 1.0  - gets help from  with most adls. Needs stand by assistance with bathing, mainly due to risk for falls.   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 MRI in 8/2023 showed hippocampal volume at 4%. Follows with both geriatrics and neurology at [facility].  Has been on donepezil 10mg daily. Also was on memantine 10mg BID though it was decreased to just at bedtime. Somehow though, he is now on memantine BID. Will decrease this to bedtime. Also had lowered donepezil to 5mg during most recent rehab stay. Continues to have weight loss and decreased appetite. Will stop donepezil as  doesn't think the meds have helped at all for cognition or functioning     13. Weight loss  - down 12 lbs between 1/2024 and 6/2024. Has been on donepezil, which may have contributed to the weight loss at least in part. Was started on mirtazapine 7.5mg HS in 6/2024 to help with appetite and sleep. Now on 15mg daily.  notes that it has been helping with sleep. But still has been losing weight. Possibly down to 172 lbs prior to hospitalization. 168 lbs here prior to re-hospitalization in early sept. Was 165 on 9/23.  Will monitor weight weekly. Dietician following. Also will stop donepezil    14. HL   - is on  pravastatin 10mg 3x per week. If he is on this for primary prevention, could consider stopping this     15. GERD  - was pantoprazole 20mg daily in hospital. We weaned him off of this and now on omeprazole 10mg PRN     16. Depression   - on escitalopram 10mg daily. Also on mirtazapine 7.5mg HS in 6/2024 to replace quetiapine, which caused oversedation. Sleeping better with mirtazapine. Can consider increasing further to 15mg daily if appetite continues to be decreased after stopping donepezil.     17. Vitamin D deficiency  - D level was 39.6 in 6/2024. Is on D3 2000 units daily. Will continue     18. Goals of care counseling discussion   Wishes to be DNR-CC-A. [place] form completed today.      19. Low back pain  - will start tylenol 1000mg TID scheduled     Disposition: Will discuss with pt, family, and interdisciplinary team throughout his rehab stay  Pt and  are interested in establishing care for geriatric primary care at [facility] upon discharge.     Tami Marroquin MD

## 2024-09-30 NOTE — LETTER
Patient: Gal Cramer  : 1940    Encounter Date: 2024    Reason for admission to [facility] for post-acute rehab UTI and physical deconditioning    Subjective   Mr. Cramer 84 y.o. year old male was re-admitted to [facility] Skilled Nursing unit on . Patient is seen for the re-initial SNF visit on . He has a past medical history of dementia, weight loss, HTN, CKD, HLD, GERD, peripheral neuropathy, and BPH (self-cath 3-4x every day).    Was recently admitted  after hospital stay (-) for fall down 1 step and R knee hematoma s/p I&D on . He was admitted to  for post-acute rehab for deconditioning on . But then was readmitted to the hospital on  directly from an ortho follow up office visit.   Had rehospitalization - 24 for traumatic hematoma of R knee, status post incision and drainage on . Plastics placed a wound vac on the open surgical wound. He had an episode of syncope/near syncope on . Given 1L fluid. Readmitted to [facility] on . He was doing really well with therapy. Was walking without a device and 1 person stand by assist. And we were getting ready to discharge him over the next couple of days. But on  he started having large quantity tiff bleeding after self straight catheterization. Bleeding lasted for 10 mins. He felt lightheaded and was nauseated. He was sent back out to the hospital.       Admitted from: acute care hospital  Facility: [facility]  Dates of hospitalization: -24  Hospital Course:  Mr. Cramer is an 85 y/o man with h/o dementia, HTN, GERD, CKD stage 3, neurogenic bladder for the past 12 years after a spermatocele surgery now straight caths himself 3-4 times per day.   He is currently at [facility] rehab after a traumatic hematoma in the right knee s/p evacuation and then leading to wound healing issues that are being taken care of at . Has a wound VAC in place. Per patient and , the wound looks  clean and much improved.   He presented to the ER with bleeding from the urethra after straight cathing. Per  has happened before usually trauma from the catheter or a UTI. This time his UA is +ve and pt was started on Rocephin in the ER.   There was a concern for anemia and VEDA but looking at his old records and recent  admit, this seems to be within baseline range. Admitted to Medicine Service for further monitoring.  # Hematuria (POA: Yes)  # BPH with urination retention   -H/o neurogenic bladder/BPH  -Pt on straight cath schedule at home done by /patient  - agreeable to placing clark 9/8 to reduce issues with delayed straight caths, pt forgets to cath himself as his  does it for him multiple times a day at home   -UA consistent with acute cystitis with hematuria  (Prior urine cultures Enterobacter) with encephalopathy.   Plan  -Got three doses of ceftriaxone   -Follow cultures-> klebsiella, Susceptible to cipro   -complete 4 more days of antibiotics with PO cipro   -QTc stable   -cont intermittent straight cath   -continue with home finasteride  #CKD3/HTN  seems to be at baseline SCr. No VEDA  - trend BMP PRN  - Continue Lisinopril 20 mg po qday  #Dementia  Continue Memantine once per day and Aricept. Please see Geriatrics notes from June. Per Dr. Banks was going to d/c Memantine but pt still on it.   -D/c Seroquel- pt not on it at home.   -cont Mirtazepine 7.5 mg qhs   -Not on Lexapro per   # Right knee hematoma, Status post incision and drainage x 2  # Knee pain   # R knee wound  # pressure injury to coccyx (POA)  - fell at home down 1 step on 8/17. Found to have R knee hematoma causing lateralization of patella. Underwent I&D by ortho on 8/19   - had an area of bleeding medial to the drainage incisions. it blistered and skin became necrotic. Was readmitted on 9/6 and had I and D on 9/7. The necrotic skin was removed and has a wound vac. Will likely not need surgical wound  "closure. Has f/up with plastics surgery in 2 weeks.   Plan  - is on aspirin 81mg daily for DVT prophylaxis   - on tylenol 1000mg TID for pain   - plan to return to rehab for PT and wound care  - wound care consulted to assess wound vac  - Wound vac to be changed every 3 days, due today   - continue OP follow up with    # Anemia  - hemoglobin decreased from 12 to 9.2 during last hospitalization. Likely related to hematoma.   - now stable   Plan   -trend CBC PRN   # Depression   - cont escitalopram 10mg daily and mirtazapine 15mg daily.   # Vitamin D deficiency  - D level was 39.6 in 6/2024. Is on D3 2000 units daily. Will continue \"    Primary caregiver: patient and   : Wes Cavazos,            [phone]     PCP: Fitz Power MD    HPI     Per patient:  - complaining of back pain when walking with rolator today  - bowels moving  - no pain while sitting    Per   - pt has appt with PCP tomorrow. Hsuband wondering if they need to keep it  - he is interested in switching care to  geriatrics at [facility]  - he feels he could benefit from savvy caregiver program for caregiver burden. He has been feeling all alone in caring for him  - he will hire a caregiver to help with pt once back home  - the appointment with plastic surgery went well. wound healing. May still need a skin flap down.   - will still need to go home with the wound vac but it can be changed every 5 days.     Living environment prior to admission: condo with his . No stairs     Assistive Devices: straight cane     ADLS prior to admission   Is dependant or requires assistance in the following BADL: was hesitant to bathe at home. But was using a tub shower. And maybe was afraid of falling. Needs stand by assistance with bathing. Dresses on his own  Is dependant or requires assistance in the following Instrumental ADL: most     Advanced Directives on file: wishes to be DNR-Comfort care -arrest     Medications " "reviewed and reconciled.     Objective     Physical Exam  Vitals: 144/88, 97F, 78, 18, 165.4 on 9/23/24 and 168.0 on 8/26  Constitutional:  Well-nourished, kempt  Head: NC/AT  Eyes: no scleral injection or erythema    Dentition: fair    Oropharyx: clear    Neck: supple. trachea midline. Thyroid not enlarged  Lymphatics: No adenopathy at neck  Respiratory: clear without rales, rhonchi or wheezes  Cardiovascular: RRR, +S1, S2, no murmurs appreciatedc    Extremeties:  1+ edema of R leg and trace in L leg. No clubbing, cyanosis  Gastrointestinal: +BS, soft, nontender.  Genitourinary: no clark  Integumentary: No ulcers, pressure sores, rash. Wound vac dressing in place over medial R knee.  Musculoskeletal: some resolving bruising on R thigh    Contractures: none    Muscle bulk: nl    Digits/nails: nl    Effusions: none   Neurologic: alert.     Get up and go: not observed  Psychologic: affect full     9/28/24  \"  Ref Range & Units 2 d ago   WBC  3.70 - 11.00 k/uL 5.39   Hemoglobin  13.0 - 17.0 g/dL 10.1 Low    Hematocrit  39.0 - 51.0 % 29.5 Low    MCV  80.0 - 100.0 fL 95.8   RDW-CV  11.5 - 15.0 % 11.9   Platelet Count  150 - 400 k/uL 59 Low      Component  Ref Range & Units 2 d ago   Glucose  74 - 99 mg/dL 98   BUN  9 - 24 mg/dL 28 High    Creatinine  0.73 - 1.22 mg/dL 1.76 High    Sodium  136 - 144 mmol/L 141   Potassium  3.7 - 5.1 mmol/L 4.8   Chloride  98 - 107 mmol/L 105   CO2  22 - 30 mmol/L 25   Anion Gap  8 - 15 mmol/L 11   Calcium, Total  8.5 - 10.2 mg/dL 9.3   Estimated Glomerular Filtration Rate  >=60 mL/min/1.73m² 37 Low      Component  Ref Range & Units 5 d ago   Color  Yellow Yellow   Clarity  Clear Cloudy Abnormal    Glucose, Urine  Negative Negative   Bilirubin, Urine  Negative Negative   Ketones, Urine  Negative Trace Abnormal    Specific Gravity, Ur  1.005 - 1.030 1.022   Hemoglobin/Blood,Ur  Negative 2+ Abnormal    pH, Urine  <8.5 5.5   Protein, Urine  Negative 1+ Abnormal    Urobilinogen  0.2-1.0 EU/dL " 0.2 EU/dL   Nitrites  Negative Positive Abnormal    Leuk Esterase  Negative 2+ Abnormal    WBC, Urine  0-5 /HPF >20 /HPF Abnormal    RBC, Urine  0-2 /HPF >20 /HPF Abnormal    Bacteria uL  Negative uL >9,821 High    Squamous Epithelial Cells  /HPF None Seen   Non-Squamous Epithelial Cells  None Seen /HPF Moderate Abnormal    Casts, Hyaline  0 /LPF 1-3 /LPF Abnormal    9/25/24  Component  Ref Range & Units 5 d ago   Glucose  74 - 99 mg/dL 125 High    Comment: The American Diabetes Association (ADA) provides guidance for cutoff values for fasting glucose and random glucose. The ADA defines fasting as no caloric intake for at least 8 hours. Fasting plasma glucose results between 100 to 125 mg/dL indicate increased risk for diabetes (prediabetes).  Fasting plasma glucose results greater than or equal to 126 mg/dL meet the criteria for diagnosis of diabetes. In the absence of unequivocal hyperglycemia, results should be confirmed by repeat testing. In a patient with classic symptoms of hyperglycemia or hyperglycemic crisis, random plasma glucose results greater than or equal to 200 mg/dL meet the criteria for diagnosis of diabetes.  Reference: Standards of Medical Care in Diabetes 2016, American Diabetes Association. Diabetes Care. 2016.39(Suppl 1).   BUN  9 - 24 mg/dL 34 High    Creatinine  0.73 - 1.22 mg/dL 1.84 High    Sodium  136 - 144 mmol/L 140   Potassium  3.7 - 5.1 mmol/L 4.2   Chloride  98 - 107 mmol/L 105   CO2  22 - 30 mmol/L 22   Anion Gap  8 - 15 mmol/L 13   Calcium, Total  8.5 - 10.2 mg/dL 9.2   Estimated Glomerular Filtration Rate  >=60 mL/min/1.73m² 35 Low    CT head wo IV contrast 08/17/2024  CT maxillofacial bones wo IV contrast 08/17/2024  CT cervical spine wo IV contrast 08/17/2024  CT HEAD:  No hyperdense intracranial hemorrhage is identified. There is no mass  effect or midline shift.  Gray-white differentiation is intact, without evidence of CT apparent  transcortical infarct, although patchy  attenuation changes are  present in the periventricular and subcortical white matter of  bilateral cerebral hemispheres, nonspecific findings favored to  represent sequela of microvascular disease.  No abnormal ventricular dilatation is present. Basal cisterns are  patent. No extra-axial fluid collections are identified.  Scalp soft tissues do not demonstrate any acute abnormality.  Calvarium is unremarkable without evidence of skull fracture.  Mastoid air cells and middle ear cavities are clear.  CT FACIAL BONES:  Bony orbits are intact, without evidence of fracture. Periorbital  soft tissues and intraorbital structures are symmetric in appearance  without evidence of acute trauma. Patient is status post cataract  extraction bilaterally.  No acute facial bone fracture is identified. Nasal bones are  unremarkable in appearance.  Paranasal sinuses are well aerated without evidence of air-fluid  levels.  Some soft tissue swelling overlies the chin, without evidence of  underlying osseous injury. No associated fluid collection or soft  tissue gas is present.Although evaluation of the oral cavity is degraded by beam hardening artifact no definite evidence of acute trauma to the oral cavity is present. Temporomandibular joints are intact, with asymmetric  degenerative changes present in the right.  Large periapical lucency surrounds the roots of the 2nd right  maxillary molar.  Parotid and submandibular glands are symmetric in appearance and  otherwise unremarkable.  CT C-SPINE:  There is straightening of normal lordotic curvature of the cervical  spine, without evidence of significant spondylolisthesis.  Cervical vertebral body heights are preserved without evidence of  compression fractures, although some insufficiency endplate changes  with smaller Schmorl's nodes are present at several levels. There is  no evidence of acute trauma to the posterior elements of the cervical  spine.  Craniocervical junction is intact,  although degenerative changes at  the atlantoaxial articulation with hypertrophic pannus extending into  the anterior epidural space of C1 contributing to mild spinal canal  narrowing at this level with the effacement of the anterior  subarachnoid space.  Facet joints are preserved without evidence of traumatic subluxation  or perching, although multilevel degenerate facet osteoarthropathy is  evident, most pronounced at C2-C3 and C3-C4 on the left.  Multilevel intervertebral disc height loss is present, moderate to  severe at C4-C5 and C5-C6 and mild at other levels.  No high-grade stenosis is identified in the cervical spine, although  mild spinal canal narrowing is suspected at C3-C4, C4-C5 and C5-C6  due to disc osteophyte complexes and ligamentum flavum thickening.  Mild spinal canal narrowing is present at C3-C4 bilaterally and C4-C5  on the left due to uncovertebral and facet joint hypertrophy.  Prevertebral and paraspinal soft tissues do not demonstrate any acute  abnormalities. Included lung apices are clear.  Impression  CT HEAD:  1. No evidence of hemorrhage, skull fracture, or other acute  intracranial trauma/abnormality.  2. Patchy attenuation changes are present in the periventricular and  subcortical white matter of bilateral cerebral hemispheres,  nonspecific findings favored to represent sequela of microvascular  disease.  CT FACIAL BONES:  1. Soft tissue swelling overlies the chin, likely posttraumatic  without absence of the underlying osseous injury. No facial or  orbital bone fracture is present.  2. Large periapical lucency surrounds the roots of the 2nd right  mandibular molar, correlate with dental exam.  CT C-SPINE:  1. No evidence of acute trauma to the cervical spine.  2. Multilevel degenerative changes of the cervical spine are present,  with intervertebral disc height loss, facet osteoarthropathy, and  mild spinal canal and neural foraminal stenosis at several levels due  to disc  osteophyte complexes, ligamentum flavum thickening and  hypertrophic facet changes.    Assessment/Plan    Hematuria  UTI  Urinary retention  - had rehospitalization 9/25-9/28 for tiff hematuria that occurred at 7pm on 9/25 at [facility] after straight catheterization. Bleeding stopped after 10 minutes. UA in hospital positive and urine culture grew klebsiella. Was given ceftriaxone for 3 days and then switched to oral cipro for 4 more days. Will complete this course on 10/1. Is n finasteride 5mg daily. And straight catheterizes himself with assistance 4 times daily. Will continue and monitor for urinary retention     Anemia  - hemoglobin decreased from 12 to 9.2 during hospitalization in early september. However during this most recent hospitalization, hemoglobin was stable around 10. Will repeat cbc tomorrow and monitor     5. CKD  - cr was up to 2.06 in hospital in early sept. Improved to 1.57 on 9/12. Baseline is around 1.5-1.7. Cr was 1.84 on admission to hospital 9/25 and was 1.76 on discharge 9/28. Will repeat renal panel tomorrow    6. Right knee hematoma, Status post incision and drainage x 2  7. R knee wound  - fell at home down 1 step on 8/17. Found to have R knee hematoma causing lateralization of patella. Underwent I&D by ortho on 8/19   - had an area of bleeding medial to the drainage incisions. it blistered and skin became necrotic. Was readmitted on 9/6 and had I and D on 9/7. The necrotic skin was removed and has a wound vac. Will likely not need surgical wound closure.   - has follow up visit with plastics surgery clinic today  Had been on aspirin 81mg bID for DVT prophylaxis for 26 days and then once daily  - on tylenol 1000mg TID for pain      8. Recurrent falls  9. Physical deconditioning   - falls are likely multifactorial due to cognitive impairment, medication, osteoarthritis  - continue PT/OT - eval and treat     10. HTN  11. Near syncope  Is on lisinopril 20mg daily. This was held in  hospital in early sept due to VEDA.  also had a possible near syncopal episode on 9/11 in hospital while working with PT. Had hypotension. Due to relatively low bps, I lowered lisinopril to 10mg prior to most recent hospitalization. But the hospital did not reconcile meds from [facility] and had been back on 20mg of lisinopril. Bps now have been 130s-150s systolic. Will continue lisinopril 20mg daily for now. Also of note, I had decreased donepezil to 5mg prior to most recent hospitalization due to ongoing weight loss and also pre-syncope. He again was put back on 10mg by hospital. Now back on 5mg here. Will wean off of donepezil.      12. Dementia- mild CDR 1.0  - gets help from  with most adls. Needs stand by assistance with bathing, mainly due to risk for falls.   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 MRI in 8/2023 showed hippocampal volume at 4%. Follows with both geriatrics and neurology at [facility].  Has been on donepezil 10mg daily. Also was on memantine 10mg BID though it was decreased to just at bedtime. Somehow though, he is now on memantine BID. Will decrease this to bedtime. Also had lowered donepezil to 5mg during most recent rehab stay. Continues to have weight loss and decreased appetite. Will stop donepezil as  doesn't think the meds have helped at all for cognition or functioning     13. Weight loss  - down 12 lbs between 1/2024 and 6/2024. Has been on donepezil, which may have contributed to the weight loss at least in part. Was started on mirtazapine 7.5mg HS in 6/2024 to help with appetite and sleep. Now on 15mg daily.  notes that it has been helping with sleep. But still has been losing weight. Possibly down to 172 lbs prior to hospitalization. 168 lbs here prior to re-hospitalization in early sept. Was 165 on 9/23.  Will monitor weight weekly. Dietician  following. Also will stop donepezil    14. HL   - is on pravastatin 10mg 3x per week. If he is on this for primary prevention, could consider stopping this     15. GERD  - was pantoprazole 20mg daily in hospital. We weaned him off of this and now on omeprazole 10mg PRN     16. Depression   - on escitalopram 10mg daily. Also on mirtazapine 7.5mg HS in 6/2024 to replace quetiapine, which caused oversedation. Sleeping better with mirtazapine. Can consider increasing further to 15mg daily if appetite continues to be decreased after stopping donepezil.     17. Vitamin D deficiency  - D level was 39.6 in 6/2024. Is on D3 2000 units daily. Will continue     18. Goals of care counseling discussion   Wishes to be DNR-CC-A. [place] form completed today.      19. Low back pain  - will start tylenol 1000mg TID scheduled     Disposition: Will discuss with pt, family, and interdisciplinary team throughout his rehab stay  Pt and  are interested in establishing care for geriatric primary care at [facility] upon discharge.     Tami Marroquin MD      Electronically Signed By: Tami Marroquin MD   9/30/24  9:29 PM

## 2024-10-01 ENCOUNTER — TELEPHONE (OUTPATIENT)
Dept: PLASTIC SURGERY | Facility: CLINIC | Age: 84
End: 2024-10-01
Payer: MEDICARE

## 2024-10-01 NOTE — TELEPHONE ENCOUNTER
Spoke with Wes about plan of care for Gal. We will defer to [facility] for home health care set up but wound care instructions and our office was given to Kaila the nurse manager at [facility]. I let Wes know she will reach out with questions as she arranges discharge for Gal.  I also let him know that I did reach out to our I rep and the patient does have a home vac at [facility] that can be sent home with him once the prevena is swapped out. We are happy to give wound care recommendations until patients STSG. Wes was also given our office information

## 2024-10-01 NOTE — TELEPHONE ENCOUNTER
Called Kaila from [facility] to discuss discharge planning and home health care for Gal. I let her know that from a plastic surgery stand point this patient is safe to go home with three times weekly wound vac dressing changes. Kaila let me know that they have not began the process of sending the patient home but do have a home vac that the patient can be sent home with. I gave her our office fax and phone number to call for wound vac recommendations. She will begin the process of sending Gal home with home health and call our office with any questions.

## 2024-10-03 ENCOUNTER — NURSING HOME VISIT (OUTPATIENT)
Dept: POST ACUTE CARE | Facility: EXTERNAL LOCATION | Age: 84
End: 2024-10-03
Payer: MEDICARE

## 2024-10-03 ENCOUNTER — TELEPHONE (OUTPATIENT)
Dept: PLASTIC SURGERY | Facility: CLINIC | Age: 84
End: 2024-10-03

## 2024-10-03 DIAGNOSIS — S81.001D OPEN WOUND OF RIGHT KNEE, SUBSEQUENT ENCOUNTER: ICD-10-CM

## 2024-10-03 DIAGNOSIS — R29.6 RECURRENT FALLS: ICD-10-CM

## 2024-10-03 DIAGNOSIS — F03.918 DEMENTIA WITH BEHAVIORAL DISTURBANCE (MULTI): ICD-10-CM

## 2024-10-03 DIAGNOSIS — S80.01XD TRAUMATIC HEMATOMA OF RIGHT KNEE, SUBSEQUENT ENCOUNTER: ICD-10-CM

## 2024-10-03 DIAGNOSIS — D64.9 ANEMIA, UNSPECIFIED TYPE: ICD-10-CM

## 2024-10-03 DIAGNOSIS — R53.81 PHYSICAL DECONDITIONING: ICD-10-CM

## 2024-10-03 DIAGNOSIS — R63.4 WEIGHT LOSS: ICD-10-CM

## 2024-10-03 DIAGNOSIS — I10 PRIMARY HYPERTENSION: ICD-10-CM

## 2024-10-03 DIAGNOSIS — N18.9 CHRONIC KIDNEY DISEASE, UNSPECIFIED CKD STAGE: ICD-10-CM

## 2024-10-03 DIAGNOSIS — R39.14 BENIGN PROSTATIC HYPERPLASIA WITH INCOMPLETE BLADDER EMPTYING: Primary | ICD-10-CM

## 2024-10-03 DIAGNOSIS — N18.31 STAGE 3A CHRONIC KIDNEY DISEASE (MULTI): ICD-10-CM

## 2024-10-03 DIAGNOSIS — N40.1 BENIGN PROSTATIC HYPERPLASIA WITH INCOMPLETE BLADDER EMPTYING: Primary | ICD-10-CM

## 2024-10-03 PROCEDURE — 99316 NF DSCHRG MGMT 30 MIN+: CPT | Performed by: NURSE PRACTITIONER

## 2024-10-03 RX ORDER — CHOLECALCIFEROL (VITAMIN D3) 50 MCG
50 TABLET ORAL DAILY
COMMUNITY

## 2024-10-03 RX ORDER — ASPIRIN 81 MG/1
81 TABLET ORAL DAILY
COMMUNITY

## 2024-10-03 RX ORDER — OMEPRAZOLE 10 MG/1
10 CAPSULE, DELAYED RELEASE ORAL
COMMUNITY

## 2024-10-03 RX ORDER — ACETAMINOPHEN 500 MG
1000 TABLET ORAL 3 TIMES DAILY
COMMUNITY

## 2024-10-03 RX ORDER — AMMONIUM LACTATE 12 G/100G
1 CREAM TOPICAL 2 TIMES DAILY
COMMUNITY

## 2024-10-03 ASSESSMENT — ENCOUNTER SYMPTOMS
DIFFICULTY URINATING: 1
DIZZINESS: 1
CONSTIPATION: 0
ARTHRALGIAS: 0
SHORTNESS OF BREATH: 0

## 2024-10-03 NOTE — PROGRESS NOTES
Service Date: 10/03/24  Admission Date: 092824  Discharge Date: 100424    Level of Care: Post-acute skilled care  SNF Attending: Tami Marroquin MD  Skilled unit resident care manager phone: [phone]  Skilled unit Fax: [phone]    Primary Care Physician: Dr. Marroquin     [facility] Course:   Mr. Cramer is a 84 y.o. male with a hx of dementia, HTN, HLD, CKD 3, GERD, peripheral neuropathy, and BPH (ISC3-4x every day)  who presented to the ED 9/6 from outpatient orthopedic office due to concern for recurrent right knee hematoma, admitted to Medicine 9/7 for elly-operative management.   Per chart review, admitted to [facility] 08/17-08/21 following unprovoked mechanical fall down 1 step resulting in R Knee hematoma which rapidly increased in size with a hemorrhagic bullae on the anteromedial portion of the R knee, causing lateralization of the patella. Patient evaluated by orthopedic surgery for hematoma management, and taken for I&D of hematoma on 8/19. Postoperatively, patient was doing well and endorsed minimal pain. DVT PPx was restarted with ASA 81 BID on POD1, and hemovac drain removed on 8/21. Discharged in stable condition to SNF.   Saw Orthopedic PA 09/06 for post-op check and found to have increased swelling in right knee and a blister over the medial knee that popped and had been managed with daily Xeroform dressings at SNF. Due to the area of previous blistering being quite large and has eschar forming, pt was readmitted for repeat I&D.  Successful I&D with ortho 9/7 and wound vac placed. During stay, high clinical concern for RLE DVT  but ultrasound not concerning for DVT 9/8. PT/OT following rec mod intensity. Plastic surgery with recommendations to allow wound to heal by secondary intention and follow up in 2 weeks.  Patient with episode of orthostatic hypotension without syncope 9/11 likely iso of dehydration. Patient responded well to 1L LR with no orthostatic hypotension morning of 9/12. Discharged  again to rehab on 091224.     Subacute Course:   Sig for rehosp for L knee infection as above. Also had episode of uncontrolled hematuria with ISC on 092524 with resulting lightheadedness and fall with head strike. He was sent to [facility] ER for evaluation and readmitted for acute cystitis, cx with klebsiella and treated with cipro and returned to  on 092724. Since then pt has participated with therapy, R knee wound vac intact and pain well controlled. ISC resumed without reported issues. Of note, when pt was last dc from [facility], ASA reduced to only daily and no longer on donepezil.     Tests/Procedures:  Lab work [id number] abnormals only BUN 34, creatinine 1.9 (uptrending), WBC 4.1, RBC 3.31, H&H 11/32, platelets 149(downtrending)    Functional Status at Discharge:  WC/walker with assist     Seen on unit today for dc planning.     Review of Systems   Respiratory:  Negative for shortness of breath.    Cardiovascular:  Negative for chest pain and leg swelling.   Gastrointestinal:  Negative for constipation.   Genitourinary:  Positive for difficulty urinating (isc).   Musculoskeletal:  Negative for arthralgias.   Neurological:  Positive for dizziness (little dizzy in therapy).        Discharge Physical Exam:   Vitals: reviewed in [facility]  Physical Exam  Constitutional:       General: He is not in acute distress.     Appearance: He is normal weight.   HENT:      Nose: Nose normal.      Mouth/Throat:      Mouth: Mucous membranes are moist.      Pharynx: Oropharynx is clear.   Eyes:      Conjunctiva/sclera: Conjunctivae normal.   Pulmonary:      Effort: Pulmonary effort is normal.   Abdominal:      General: Abdomen is flat.   Musculoskeletal:      Cervical back: Normal range of motion and neck supple.      Right knee: Decreased range of motion.      Comments: R knee wound vac intact    Skin:     General: Skin is warm and dry.   Neurological:      Mental Status: He is alert. Mental status is at baseline.   Psychiatric:          Cognition and Memory: Cognition is impaired. Memory is impaired.        Assessment and plan:  Right knee hematoma, Status post incision and drainage x 2  2. Knee pain   3. R knee wound  - wound vac intact   - is on aspirin 81mg bID for DVT prophylaxis for 26 days and then once daily  - on tylenol 1000mg TID for pain, pain controlled      4. Recurrent falls  5. Physical deconditioning   - falls are likely multifactorial due to cognitive impairment, medication, osteoarthritis  - PT/OT to continue at home      6. HTN  7. Near syncope  - lisinopril 20 mg daily   -  given near syncopal episodes, will decrease donepezil to 5mg daily  -  bps elevated over past few days up to 158/92, needs monitored,      8. Dementia- mild CDR 1.0  - gets help from  with most adls. Needs stand by assistance with bathing, mainly due to risk for falls.   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 MRI in 8/2023 showed hippocampal volume at 4%. Follows with both geriatrics and neurology at [facility].  Has been on donepezil 10mg daily. Also was on memantine 10mg BID though it was decreased to just at bedtime.  doesn't think the meds have helped at all for cognition. Also has had weight loss and presyncopal episodes. Of note, when pt dc back from [facility] no longer on donepezil      9. Weight loss  - down 12 lbs between 1/2024 and 6/2024. Has been on donepezil, which may have contributed to the weight loss at least in part. Was started on mirtazapine 7.5mg HS in 6/2024 to help with appetite and sleep. Now on 15mg daily.  notes that it has been helping with sleep. But still has been losing weight. Possibly down to 172 lbs prior to hospitalization. 168 lbs here prior to hospitalization. Will monitor weight weekly. Dietician following. No longer on donepezil      10. VEDA on CKD  - cr was up to 2.06 in hospital. Improved  to 1.7 on 7/21 and was 1.57 on 9/12. Baseline is around 1.5.  has mild rise with labs from [id number]. Monitor      11. Anemia  - hemoglobin decreased from 12 to 9.2 during hospitalization. Likely related to hematoma. Uptrending      12. BPH with urination retention   - is on finasteride 5mg daily. And straight catheterizes himself with assistance 4 times daily. Will continue and monitor for urinary retention      13. HL   - is on pravastatin 10mg daily. If he is on this for primary prevention, could consider stopping this     14. GERD  - was pantoprazole 20mg daily. In hospital they increased this to 40mg daily. Not clear why.  Will continue for now though not clear he truly needs this. Could consider weaning off of this as an outpatient     15. Depression   - on escitalopram 10mg daily. Also on mirtazapine 15mg daily. Was started at 7.5mg HS in 6/2024 to replace quetiapine. Will continue these meds for now.      16. Vitamin D deficiency  - D level was 39.6 in 6/2024. Is on D3 2000 units daily. Will continue    Discharge Condition: stable   Discharge Disposition:  home with home care   Discharge Meds: SEE ATTACHED LIST      Discharge Instructions:      Transitions of Care Critical Issues: consider med changes for pantoprazole as listed above, monitor wts and renal status, reassess bps which were elevated prior to dc.   Imaging follow-up: per ortho   Lab Monitoring Needed: per pcp   Specialists Follow-Up Needed:  ortho/plastics fu as arranged         Future Appointments: none listed in [facility] chart

## 2024-10-03 NOTE — LETTER
Patient: Gal Cramer  : 1940    Encounter Date: 10/03/2024    Service Date: 10/03/24  Admission Date: 928  Discharge Date: 1004    Level of Care: Post-acute skilled care  SNF Attending: Tami Marroquin MD  Skilled unit resident care manager phone: [phone]  Skilled unit Fax: [phone]    Primary Care Physician: Dr. Marroquin     [facility] Course:   Mr. Cramer is a 84 y.o. male with a hx of dementia, HTN, HLD, CKD 3, GERD, peripheral neuropathy, and BPH (ISC3-4x every day)  who presented to the ED  from outpatient orthopedic office due to concern for recurrent right knee hematoma, admitted to Medicine  for elly-operative management.   Per chart review, admitted to [facility] - following unprovoked mechanical fall down 1 step resulting in R Knee hematoma which rapidly increased in size with a hemorrhagic bullae on the anteromedial portion of the R knee, causing lateralization of the patella. Patient evaluated by orthopedic surgery for hematoma management, and taken for I&D of hematoma on . Postoperatively, patient was doing well and endorsed minimal pain. DVT PPx was restarted with ASA 81 BID on POD1, and hemovac drain removed on . Discharged in stable condition to SNF.   Saw Orthopedic PA  for post-op check and found to have increased swelling in right knee and a blister over the medial knee that popped and had been managed with daily Xeroform dressings at SNF. Due to the area of previous blistering being quite large and has eschar forming, pt was readmitted for repeat I&D.  Successful I&D with ortho  and wound vac placed. During stay, high clinical concern for RLE DVT  but ultrasound not concerning for DVT . PT/OT following rec mod intensity. Plastic surgery with recommendations to allow wound to heal by secondary intention and follow up in 2 weeks.  Patient with episode of orthostatic hypotension without syncope  likely iso of dehydration. Patient responded  well to 1L LR with no orthostatic hypotension morning of 9/12. Discharged again to rehab on 091224.     Subacute Course:   Sig for rehosp for L knee infection as above. Also had episode of uncontrolled hematuria with ISC on 092524 with resulting lightheadedness and fall with head strike. He was sent to [facility] ER for evaluation and readmitted for acute cystitis, cx with klebsiella and treated with cipro and returned to  on 092724. Since then pt has participated with therapy, R knee wound vac intact and pain well controlled. ISC resumed without reported issues. Of note, when pt was last dc from [facility], ASA reduced to only daily and no longer on donepezil.     Tests/Procedures:  Lab work [id number] abnormals only BUN 34, creatinine 1.9 (uptrending), WBC 4.1, RBC 3.31, H&H 11/32, platelets 149(downtrending)    Functional Status at Discharge:  WC/walker with assist     Seen on unit today for dc planning.     Review of Systems   Respiratory:  Negative for shortness of breath.    Cardiovascular:  Negative for chest pain and leg swelling.   Gastrointestinal:  Negative for constipation.   Genitourinary:  Positive for difficulty urinating (isc).   Musculoskeletal:  Negative for arthralgias.   Neurological:  Positive for dizziness (little dizzy in therapy).        Discharge Physical Exam:   Vitals: reviewed in [facility]  Physical Exam  Constitutional:       General: He is not in acute distress.     Appearance: He is normal weight.   HENT:      Nose: Nose normal.      Mouth/Throat:      Mouth: Mucous membranes are moist.      Pharynx: Oropharynx is clear.   Eyes:      Conjunctiva/sclera: Conjunctivae normal.   Pulmonary:      Effort: Pulmonary effort is normal.   Abdominal:      General: Abdomen is flat.   Musculoskeletal:      Cervical back: Normal range of motion and neck supple.      Right knee: Decreased range of motion.      Comments: R knee wound vac intact    Skin:     General: Skin is warm and dry.   Neurological:      Mental Status:  He is alert. Mental status is at baseline.   Psychiatric:         Cognition and Memory: Cognition is impaired. Memory is impaired.        Assessment and plan:  Right knee hematoma, Status post incision and drainage x 2  2. Knee pain   3. R knee wound  - wound vac intact   - is on aspirin 81mg bID for DVT prophylaxis for 26 days and then once daily  - on tylenol 1000mg TID for pain, pain controlled      4. Recurrent falls  5. Physical deconditioning   - falls are likely multifactorial due to cognitive impairment, medication, osteoarthritis  - PT/OT to continue at home      6. HTN  7. Near syncope  - lisinopril 20 mg daily   -  given near syncopal episodes, will decrease donepezil to 5mg daily  -  bps elevated over past few days up to 158/92, needs monitored,      8. Dementia- mild CDR 1.0  - gets help from  with most adls. Needs stand by assistance with bathing, mainly due to risk for falls.   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 MRI in 8/2023 showed hippocampal volume at 4%. Follows with both geriatrics and neurology at [facility].  Has been on donepezil 10mg daily. Also was on memantine 10mg BID though it was decreased to just at bedtime.  doesn't think the meds have helped at all for cognition. Also has had weight loss and presyncopal episodes. Of note, when pt dc back from [facility] no longer on donepezil      9. Weight loss  - down 12 lbs between 1/2024 and 6/2024. Has been on donepezil, which may have contributed to the weight loss at least in part. Was started on mirtazapine 7.5mg HS in 6/2024 to help with appetite and sleep. Now on 15mg daily.  notes that it has been helping with sleep. But still has been losing weight. Possibly down to 172 lbs prior to hospitalization. 168 lbs here prior to hospitalization. Will monitor weight weekly. Dietician following. No longer on  donepezil      10. VEDA on CKD  - cr was up to 2.06 in hospital. Improved to 1.7 on 7/21 and was 1.57 on 9/12. Baseline is around 1.5.  has mild rise with labs from 590321. Monitor      11. Anemia  - hemoglobin decreased from 12 to 9.2 during hospitalization. Likely related to hematoma. Uptrending      12. BPH with urination retention   - is on finasteride 5mg daily. And straight catheterizes himself with assistance 4 times daily. Will continue and monitor for urinary retention      13. HL   - is on pravastatin 10mg daily. If he is on this for primary prevention, could consider stopping this     14. GERD  - was pantoprazole 20mg daily. In hospital they increased this to 40mg daily. Not clear why.  Will continue for now though not clear he truly needs this. Could consider weaning off of this as an outpatient     15. Depression   - on escitalopram 10mg daily. Also on mirtazapine 15mg daily. Was started at 7.5mg HS in 6/2024 to replace quetiapine. Will continue these meds for now.      16. Vitamin D deficiency  - D level was 39.6 in 6/2024. Is on D3 2000 units daily. Will continue    Discharge Condition: stable   Discharge Disposition:  home with home care   Discharge Meds: SEE ATTACHED LIST      Discharge Instructions:      Transitions of Care Critical Issues: consider med changes for pantoprazole as listed above, monitor wts and renal status, reassess bps which were elevated prior to dc.   Imaging follow-up: per ortho   Lab Monitoring Needed: per pcp   Specialists Follow-Up Needed:  ortho/plastics fu as arranged         Future Appointments: none listed in  chart      Electronically Signed By: DARLENE Vivar   10/3/24 12:29 PM

## 2024-10-03 NOTE — Clinical Note
I hear you will be following him in clinic, so here is the dc note, but there are no appts yet scheduled. Just of note, your notes had said to cut down the aricept but [facility] never had it on the list when he came back after this last admit, so he is not on it, I did not resume it when I was doing the dc. And his bp a little up in last 2 days, will have nurse recheck but kept him on the 20 mg of lisinopril-notes said it was planned to cut to 10 mg but not done and would not do now

## 2024-10-04 ENCOUNTER — TELEPHONE (OUTPATIENT)
Dept: PLASTIC SURGERY | Facility: CLINIC | Age: 84
End: 2024-10-04
Payer: MEDICARE

## 2024-10-04 NOTE — TELEPHONE ENCOUNTER
Spoke with Mr. Cramer's POMORIS Harvey. He has concerns of caring for him at home, he is currently at [facility]. He admits its not the wound vac that concerns him its Gal's dementia and 24 hour care. I explained to him the process for home health care, wound vac maintenance and steps to take if wound vac alarms or malfunctions. I also let him know our  will be in touch with him to schedule surgery. We also talked about after surgery care for a skin graft. Suggested he speak with a  at [facility] facility for additional resources if he decides to bring him home. Current plan is to stay at [facility] for the next two weeks as self pay.

## 2024-10-10 PROBLEM — N43.40 SPERMATOCELE: Status: RESOLVED | Noted: 2024-10-10 | Resolved: 2024-10-10

## 2024-10-10 PROBLEM — N30.01 ACUTE CYSTITIS WITH HEMATURIA: Status: RESOLVED | Noted: 2017-09-12 | Resolved: 2024-10-10

## 2024-10-10 PROBLEM — H90.3 SENSORINEURAL HEARING LOSS, BILATERAL: Status: ACTIVE | Noted: 2021-10-11

## 2024-10-10 PROBLEM — R41.3 MEMORY IMPAIRMENT: Status: ACTIVE | Noted: 2022-12-27

## 2024-10-10 PROBLEM — N18.30 CKD (CHRONIC KIDNEY DISEASE) STAGE 3, GFR 30-59 ML/MIN (MULTI): Status: ACTIVE | Noted: 2017-04-13

## 2024-10-10 PROBLEM — J30.9 ALLERGIC RHINITIS: Status: RESOLVED | Noted: 2024-10-10 | Resolved: 2024-10-10

## 2024-10-10 PROBLEM — E78.5 HYPERLIPIDEMIA: Status: ACTIVE | Noted: 2024-10-10

## 2024-10-10 PROBLEM — H18.519 FUCHS' CORNEAL DYSTROPHY: Status: ACTIVE | Noted: 2024-10-10

## 2024-10-10 PROBLEM — F03.90 DEMENTIA WITHOUT BEHAVIORAL DISTURBANCE (MULTI): Status: ACTIVE | Noted: 2023-06-13

## 2024-10-10 PROBLEM — L89.300 PRESSURE INJURY OF BUTTOCK, UNSTAGEABLE (MULTI): Status: ACTIVE | Noted: 2024-09-27

## 2024-10-10 PROBLEM — R04.0 EPISTAXIS: Status: ACTIVE | Noted: 2020-09-15

## 2024-10-10 PROBLEM — R20.0 NUMBNESS: Status: ACTIVE | Noted: 2024-10-10

## 2024-10-10 RX ORDER — TRIAMCINOLONE ACETONIDE 1 MG/G
CREAM TOPICAL
COMMUNITY
Start: 2024-04-15

## 2024-10-10 RX ORDER — TAMSULOSIN HYDROCHLORIDE 0.4 MG/1
1 CAPSULE ORAL DAILY
COMMUNITY
Start: 2013-09-24

## 2024-10-10 RX ORDER — TALC
POWDER (GRAM) TOPICAL
COMMUNITY

## 2024-10-13 NOTE — PROGRESS NOTES
"Subjective   Mr. Cramer 84 y.o. year old male is accompanied by: partner chiki \"Max\"  Consult Requested By: self  Reason for consultation or primary concern: Memory Loss and New Patient Visit    Saw plastic surgery 24 for follow up   \"Discussed surgical options for wound closure. Wound is ready for skin graft. Patient is ready for discharge from [facility] and can start home care. We will order home care for the patient to have home health care.   - Recommend continued wound vac therapy until STSG\"    Telephone note 10/3/24 to [facility] surgery  \"  Spoke with Mr. Cramer's POA Wes. He has concerns of caring for him at home, he is currently at [facility]. He admits its not the wound vac that concerns him its Gal's dementia and 24 hour care. I explained to him the process for home health care, wound vac maintenance and steps to take if wound vac alarms or malfunctions. I also let him know our  will be in touch with him to schedule surgery. We also talked about after surgery care for a skin graft. Suggested he speak with a  at [facility] for additional resources if he decides to bring him home. Current plan is to stay at [facility] for the next two weeks as self pay.       \"    HPI     Per patient and partner (obtained in addition due to dementia)  - no dysuria .   - doing straight cath 4x per day at home. Mainly only 3x at [facility]. Not always reminded or assisted.   - hasn't had any bleeding with catheterization in past   - sometimes goes  - no lightheadednesss or dizziness  - very little pain  - swelling in R leg has decreased.       Family history   - mother had dementia.  at 85. Was in facility for last couple years of life. Had behavioral issues.   Dad  at 87 cancer      Social history:   Together with partner Navi. 56  Had been living in [place]. Came back to [place] in   Pt was . Retired in    Bachelor's degree from " [employer]  No kids  Pt is a . Assets too high for aid and attendance  There is long term care insurance  [employer] [facility] [employer] members  Looking into 2 caregivers for private. Both private. One is jarad from [employer].   4 glasses of wine per week    Living environment:  currently doing a respite stay  Live in [facility] in a condo     Is dependant or requires assistance in the following BADL: forgets to detach his wound vac or forgets to remove the   Is dependant or requires assistance in the following Instrumental ADL: all     Advanced Directives on file: is DNRcc-A. Need to get a copy of his HCPOA and living will and dnr form for EPIC      Medications reviewed and reconciled.     Objective   There were no vitals taken for this visit.    Physical Exam  Constitutional:  Well-nourished, kempt  Head: NC/AT  Eyes: PERRL, EOMI.   ENT: TM's +LR b/l    Dentition:    Oropharyx: clear    Neck: supple. trachea midline. Thyroid not enlarged  Lymphatics: No adenopathy at neck  Respiratory: clear without rales, rhonchi or wheezes  Cardiovascular: RRR, +S1, S2, no murmurs appreciated, JVD physiologic    Carotids: upstroke nl. no bruit    DP:  +2 R   +2 L    PT:  +2 R   +2 L    Extremeties:  No edema, clubbing, cyanosis  Gastrointestinal: +BS, soft, nontender.  Genitourinary: deferred  Integumentary: No ulcers, pressure sores, rash  Musculoskeletal:    Contractures: none    Muscle bulk: nl    Digits/nails: nl    Effusions: none   Neurologic:    CNII-XII: nl    tone: nl     Strength UE: nl      LE:  nl    DTR +2 except:    F->N: nl b/l    Rapid alternating motion: nl b/l    fine finger movements: nl b/l    Sensation: light touch intact at wrist, ankle.    Rhomberg: nl    Drift: none    Get up and go: slightly pushes to stand. Somewhat slow gait. Slightly reduced step length   Psychologic: affect full     MMSE: 17 /30  NPI: yes for depression, moderate. Yes for anxiety, mild. Yes for apathy- mild, yes for motor disturbance,  mild, yes for nighttime behaviors mild. Yes for appetite issues moderate.   SISCB: 7/28  FAQ: dependent for all except no hobbies or games and rarely discusses tv, book or magazine (has trouble following)    10/2/24  Glucose 83, na 139, k 4.1, cl 104, co2 27, bun 34, cr 1.9, ca 8.8, wbc 4.1, hgb 11.0, hematocrit 32.0 mcv 96.7, plts 149    Component  Ref Range & Units 2 wk ago   Glucose  74 - 99 mg/dL 98   BUN  9 - 24 mg/dL 28 High    Creatinine  0.73 - 1.22 mg/dL 1.76 High    Sodium  136 - 144 mmol/L 141   Potassium  3.7 - 5.1 mmol/L 4.8   Chloride  98 - 107 mmol/L 105   CO2  22 - 30 mmol/L 25   Anion Gap  8 - 15 mmol/L 11   Calcium, Total  8.5 - 10.2 mg/dL 9.3   Estimated Glomerular Filtration Rate  >=60 mL/min/1.73m² 37 Low      Component  Ref Range & Units 2 wk ago   WBC  3.70 - 11.00 k/uL 5.39   Hemoglobin  13.0 - 17.0 g/dL 10.1 Low    Hematocrit  39.0 - 51.0 % 29.5 Low    MCV  80.0 - 100.0 fL 95.8   RDW-CV  11.5 - 15.0 % 11.9   Platelet Count  150 - 400 k/uL 59 Low        No results found for this or any previous visit from the past 365 days.     CT head wo IV contrast 08/17/2024  CT maxillofacial bones wo IV contrast 08/17/2024  CT cervical spine wo IV contrast 08/17/2024  CT HEAD:  No hyperdense intracranial hemorrhage is identified. There is no mass effect or midline shift.  Gray-white differentiation is intact, without evidence of CT apparent  transcortical infarct, although patchy attenuation changes are  present in the periventricular and subcortical white matter of  bilateral cerebral hemispheres, nonspecific findings favored to  represent sequela of microvascular disease.  No abnormal ventricular dilatation is present. Basal cisterns are  patent. No extra-axial fluid collections are identified.  Scalp soft tissues do not demonstrate any acute abnormality.  Calvarium is unremarkable without evidence of skull fracture.  Mastoid air cells and middle ear cavities are clear.  CT FACIAL BONES:  Bony orbits  are intact, without evidence of fracture. Periorbital  soft tissues and intraorbital structures are symmetric in appearance  without evidence of acute trauma. Patient is status post cataract  extraction bilaterally.  No acute facial bone fracture is identified. Nasal bones are  unremarkable in appearance.  Paranasal sinuses are well aerated without evidence of air-fluid  levels.  Some soft tissue swelling overlies the chin, without evidence of  underlying osseous injury. No associated fluid collection or soft  tissue gas is present.  Although evaluation of the oral cavity is degraded by beam hardening  artifact no definite evidence of acute trauma to the oral cavity is  present. Temporomandibular joints are intact, with asymmetric  degenerative changes present in the right.  Large periapical lucency surrounds the roots of the 2nd right  maxillary molar.  Parotid and submandibular glands are symmetric in appearance and  otherwise unremarkable.  CT C-SPINE:  There is straightening of normal lordotic curvature of the cervical  spine, without evidence of significant spondylolisthesis.  Cervical vertebral body heights are preserved without evidence of  compression fractures, although some insufficiency endplate changes with smaller Schmorl's nodes are present at several levels. There is no evidence of acute trauma to the posterior elements of the cervical spine.  Craniocervical junction is intact, although degenerative changes at  the atlantoaxial articulation with hypertrophic pannus extending into the anterior epidural space of C1 contributing to mild spinal canal narrowing at this level with the effacement of the anterior  subarachnoid space.  Facet joints are preserved without evidence of traumatic subluxation  or perching, although multilevel degenerate facet osteoarthropathy is evident, most pronounced at C2-C3 and C3-C4 on the left.  Multilevel intervertebral disc height loss is present, moderate to  severe at  C4-C5 and C5-C6 and mild at other levels.  No high-grade stenosis is identified in the cervical spine, although  mild spinal canal narrowing is suspected at C3-C4, C4-C5 and C5-C6  due to disc osteophyte complexes and ligamentum flavum thickening.  Mild spinal canal narrowing is present at C3-C4 bilaterally and C4-C5 on the left due to uncovertebral and facet joint hypertrophy.  Prevertebral and paraspinal soft tissues do not demonstrate any acute abnormalities. Included lung apices are clear.  Impression  CT HEAD:  1. No evidence of hemorrhage, skull fracture, or other acute  intracranial trauma/abnormality.  2. Patchy attenuation changes are present in the periventricular and  subcortical white matter of bilateral cerebral hemispheres,  nonspecific findings favored to represent sequela of microvascular  disease.  CT FACIAL BONES:  1. Soft tissue swelling overlies the chin, likely posttraumatic  without absence of the underlying osseous injury. No facial or  orbital bone fracture is present.  2. Large periapical lucency surrounds the roots of the 2nd right  mandibular molar, correlate with dental exam.  CT C-SPINE:  1. No evidence of acute trauma to the cervical spine.  2. Multilevel degenerative changes of the cervical spine are present,  with intervertebral disc height loss, facet osteoarthropathy, and  mild spinal canal and neural foraminal stenosis at several levels due  to disc osteophyte complexes, ligamentum flavum thickening and  hypertrophic facet changes.    Assessment/Plan   Hematuria  Urinary retention  - had rehospitalization 9/25-9/28 for tiff hematuria that occurred at 7pm on 9/25 at [facility] after straight catheterization. Bleeding stopped after 10 minutes. UA in hospital positive and urine culture grew klebsiella. Was given ceftriaxone for 3 days and then switched to oral cipro for 4 more days. He straight catheterizes himself with assistance 4 times daily. Hasn't had any recent bleeding with  catheterization. Will continue and monitor for urinary retention and bleeding post  catheterization. Needs f/up with urology.      3. Anemia  - hemoglobin decreased from 12 to 9.2 during hospitalization in early september. However during this most recent hospitalization, hemoglobin was stable around 10. And was 11.0 on 10/2 at [facility]. Will monitor      4. CKD  - cr was up to 2.06 in hospital in early sept. Improved to 1.57 on 9/12. Baseline is around 1.5-1.7. Cr was 1.84 on admission to hospital 9/25 and was 1.76 on discharge 9/28. And was 1.9 on 10/2. May be at a new higher baseline now. Will monitor     5. Right knee hematoma and wound, Status post incision and drainage x 2  - fell at home down 1 step on 8/17. Found to have R knee hematoma causing lateralization of patella. Underwent I&D by ortho on 8/19   - had an area of bleeding medial to the drainage incisions. it blistered and skin became necrotic. Was readmitted on 9/6 and had I and D on 9/7. The necrotic skin was removed and has a wound vac.  - saw plastics surgery for follow up on  9/30. Continues on wound vac every 3 days. And plan is still of closure with skin graft.   - partner is hoping to be able to avoid the surgery  - advised setting up follow up with plastics.   - partner would like help with the wound vac dressing change every 3 days  - if the vac or dressing comes off prior to the wound vac dressing change, recommend covering wound bed with xeroform gauze and then cover with foam dressing and change daily.     6. HTN  Is on lisinopril 20mg daily. This was held in hospital in early sept due to VEDA.  also had a possible near syncopal episode on 9/11 in hospital while working with PT. Had hypotension. Due to relatively low bps, I lowered lisinopril to 10mg prior to most recent hospitalization. But the hospital did not reconcile meds from [facility] and was giving him 20mg of lisinopril. Currently on 20mg daily. Bps now have been [id number]  systolic. Will continue lisinopril 20mg daily and monitor     7. Dementia- mild CDR  2.0  - gets help from  with most iadls. Needs some cueing, prompting for bathing and grooming. Needs some assistance with straight caths.    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 MRI in 8/2023 showed hippocampal volume at 4%. Previously followed with both geriatrics and neurology at [facility].  Had been on donepezil 10mg daily and memantine 10mg BID. Partner did not notice any improvement on these meds. We weaned off of donepezil due to weight loss and also possibly pre-syncope.  And memantine was decreased to 10mg HS. Of note, pt was previously on quetiapine at bedtime but caused increased agitation and confusion. Sleep improved in 6/2024 when quetiapine was switched to mirtazapine 7.5m HS    8. Weight loss  - down 12 lbs between 1/2024 and 6/2024. Had been on donepezil, which may have contributed to the weight loss at least in part. Was started on mirtazapine 7.5mg HS in 6/2024 to help with appetite and sleep. Now on 15mg daily.  And donepezil stopped on 9/30/24. Weight is actually up a little since then. Will monitor     9. HL   - is on pravastatin 10mg 3x per week. If he is on this for primary prevention, could consider stopping this in the future     10. GERD  - has been getting omeprazole 10mg PRN at [facility]. Not have GERD. Continue on this Prn      11. Depression   - on escitalopram 10mg daily. Also on mirtazapine 15mg HS. Mood has been good and stable. Will monitor. Can continue decreasing escitalopram to 5mg in near future     12. Vitamin D deficiency  - D level was 39.6 in 6/2024. Is on D3 2000 units daily. Will continue     13.  Low back pain  - was having this while at [facility]. Started tylenol 1000mg TID scheduled. Can probably change this to PRN upon discharge.     14. Goals of care counseling  discussion   Wishes to be DNR-CC-A. [place] form completed upon admission to [facility]. Will scan into the  system as well   Will address health maintenance issues at next visit      Tami Marroquin MD

## 2024-10-14 ENCOUNTER — TELEPHONE (OUTPATIENT)
Dept: PLASTIC SURGERY | Facility: CLINIC | Age: 84
End: 2024-10-14
Payer: MEDICARE

## 2024-10-14 NOTE — TELEPHONE ENCOUNTER
Returned Colin's message regarding timing of surgery for Gal Cramer. He did not answer so I left a message letting him know we do not yet have insurance approval so we cannot offer him a surgical date until we get authorization from his insurance company.

## 2024-10-15 ENCOUNTER — APPOINTMENT (OUTPATIENT)
Dept: GERIATRIC MEDICINE | Facility: CLINIC | Age: 84
End: 2024-10-15
Payer: MEDICARE

## 2024-10-15 ENCOUNTER — OFFICE VISIT (OUTPATIENT)
Dept: GERIATRIC MEDICINE | Facility: CLINIC | Age: 84
End: 2024-10-15
Payer: MEDICARE

## 2024-10-15 ENCOUNTER — SOCIAL WORK (OUTPATIENT)
Dept: GERIATRIC MEDICINE | Facility: CLINIC | Age: 84
End: 2024-10-15
Payer: MEDICARE

## 2024-10-15 VITALS
TEMPERATURE: 98 F | DIASTOLIC BLOOD PRESSURE: 66 MMHG | WEIGHT: 169.4 LBS | BODY MASS INDEX: 25.02 KG/M2 | HEART RATE: 73 BPM | SYSTOLIC BLOOD PRESSURE: 124 MMHG | RESPIRATION RATE: 18 BRPM

## 2024-10-15 DIAGNOSIS — S80.01XS TRAUMATIC HEMATOMA OF RIGHT KNEE, SEQUELA: ICD-10-CM

## 2024-10-15 DIAGNOSIS — E55.9 VITAMIN D DEFICIENCY: ICD-10-CM

## 2024-10-15 DIAGNOSIS — S81.001D OPEN WOUND OF RIGHT KNEE, SUBSEQUENT ENCOUNTER: ICD-10-CM

## 2024-10-15 DIAGNOSIS — R33.9 URINARY RETENTION: Primary | ICD-10-CM

## 2024-10-15 DIAGNOSIS — Z87.19 HISTORY OF GASTROESOPHAGEAL REFLUX (GERD): ICD-10-CM

## 2024-10-15 DIAGNOSIS — N18.30 STAGE 3 CHRONIC KIDNEY DISEASE, UNSPECIFIED WHETHER STAGE 3A OR 3B CKD (MULTI): ICD-10-CM

## 2024-10-15 DIAGNOSIS — R63.4 WEIGHT LOSS: ICD-10-CM

## 2024-10-15 DIAGNOSIS — I10 PRIMARY HYPERTENSION: ICD-10-CM

## 2024-10-15 DIAGNOSIS — Z71.89 GOALS OF CARE, COUNSELING/DISCUSSION: ICD-10-CM

## 2024-10-15 DIAGNOSIS — F32.5 MAJOR DEPRESSIVE DISORDER IN REMISSION, UNSPECIFIED WHETHER RECURRENT (CMS-HCC): ICD-10-CM

## 2024-10-15 DIAGNOSIS — D64.9 ANEMIA, UNSPECIFIED TYPE: ICD-10-CM

## 2024-10-15 DIAGNOSIS — F03.B0: ICD-10-CM

## 2024-10-15 DIAGNOSIS — R31.9 HEMATURIA, UNSPECIFIED TYPE: ICD-10-CM

## 2024-10-15 PROCEDURE — 99215 OFFICE O/P EST HI 40 MIN: CPT | Performed by: INTERNAL MEDICINE

## 2024-10-15 RX ORDER — MIRTAZAPINE 15 MG/1
15 TABLET, FILM COATED ORAL NIGHTLY
Qty: 90 TABLET | Refills: 3 | Status: SHIPPED | OUTPATIENT
Start: 2024-10-15

## 2024-10-15 RX ORDER — CHOLECALCIFEROL (VITAMIN D3) 25 MCG
50 TABLET ORAL DAILY
COMMUNITY
Start: 2024-10-15

## 2024-10-15 RX ORDER — MEMANTINE HYDROCHLORIDE 10 MG/1
10 TABLET ORAL NIGHTLY
Start: 2024-10-15

## 2024-10-15 RX ORDER — LANOLIN ALCOHOL/MO/W.PET/CERES
1000 CREAM (GRAM) TOPICAL DAILY
COMMUNITY

## 2024-10-15 RX ORDER — DONEPEZIL HYDROCHLORIDE 10 MG/1
10 TABLET, FILM COATED ORAL NIGHTLY
COMMUNITY
End: 2024-10-15 | Stop reason: WASHOUT

## 2024-10-15 ASSESSMENT — PATIENT HEALTH QUESTIONNAIRE - PHQ9
2. FEELING DOWN, DEPRESSED OR HOPELESS: NOT AT ALL
1. LITTLE INTEREST OR PLEASURE IN DOING THINGS: NOT AT ALL
SUM OF ALL RESPONSES TO PHQ9 QUESTIONS 1 AND 2: 0

## 2024-10-15 ASSESSMENT — MINI MENTAL STATE EXAM
WHAT STATE, COUNTRY, CITY, HOSPITAL, FLOOR: 4 CORRECT
SUM ALL MMSE QUESTIONS FOR TOTAL SCORE [OUT OF 30].: 17
SPELL THE WORD WORLD FORWARD AND BACKWARDS OR SERIAL 7S: 2 CORRECT
PLEASE COPY THIS PICTURE (NOTE ALL 10 ANGLES MUST BE PRESENT AND TWO MUST INTERSECT): 0 CORRECT
SHOW: PENCIL [OBJECT] ASK: WHAT IS THIS CALLED?: 2 CORRECT
PLACE DESIGN, ERASER AND PENCIL IN FRONT OF THE PERSON.  SAY:  COPY THIS DESIGN PLEASE.  SHOW: DESIGN. ALLOW: MULTIPLE TRIES. WAIT UNTIL PERSON IS FINISHED AND HANDS IT BACK. SCORE: ONLY FOR DIAGRAM WITH 4-SIDED FIGURE BETWEEN TWO 5-SIDED FIGURES: 1 CORRECT
NAME OR REPEAT 3 OBJECTS - (APPLE, TABLE, PENNY) OR (BALL, TREE, FLAG): 3 CORRECT
SAY:  READ THE WORDS ON THE PAGE AND THEN DO WHAT IT SAYS.  THEN HAND THE PERSON THE SHEET WITH CLOSE YOUR EYES ON IT.  IF THE SUBJECT READS AND DOES NOT CLOSE THEIR EYES, REPEAT UP TO THREE TIMES.  SCORE ONLY IF SUBJECT CLOSES EYES.: 3 CORRECT
SAY: I WOULD LIKE YOU TO REPEAT THIS PHRASE AFTER ME: NO IFS, ANDS, OR BUTS.: 1 CORRECT
RECALL THE 3 OBJECTS FROM ABOVE (APPLE, TABLE, PENNY) OR (BALL, TREE, FLAG): 0 CORRECT / UNABLE TO SCORE
WHAT IS THE YEAR, SEASON, DATE, DAY, AND MONTH: 0 CORRECT
HAND THE PERSON A PENCIL AND PAPER. SAY:  WRITE ANY COMPLETE SENTENCE ON THAT PIECE OF PAPER. (NOTE: THE SENTENCE MUST MAKE SENSE.  IGNORE SPELLING ERRORS): 1 CORRECT

## 2024-10-15 ASSESSMENT — ACTIVITIES OF DAILY LIVING (ADL)
TOILETING: INDEPENDENT
MANAGING_FINANCES: INDEPENDENT
GROOMING: INDEPENDENT
ADEQUATE_TO_COMPLETE_ADL: YES
USING_TELEPHONE: INDEPENDENT
NEEDS_ASSISTANCE_WITH_FOOD: INDEPENDENT
PATIENT'S MEMORY ADEQUATE TO SAFELY COMPLETE DAILY ACTIVITIES?: YES
ASSISTIVE_DEVICE: HEARING AID - LEFT;HEARING AID - RIGHT;EYEGLASSES
WALKS IN HOME: INDEPENDENT
TAKING_MEDICATION: TOTAL CARE
DRESSING YOURSELF: INDEPENDENT
PILL_BOX_USED: YES
DOING_HOUSEWORK: INDEPENDENT
GROCERY_SHOPPING: NEEDS ASSISTANCE
FEEDING YOURSELF: INDEPENDENT
STILL_DRIVING: NO
JUDGMENT_ADEQUATE_SAFELY_COMPLETE_DAILY_ACTIVITIES: YES
PREPARING_MEALS: NEEDS ASSISTANCE
EATING: INDEPENDENT
BATHING: INDEPENDENT
USING_TRANSPORTATION: INDEPENDENT
HEARING - RIGHT EAR: HEARING AID
HEARING - LEFT EAR: HEARING AID

## 2024-10-15 ASSESSMENT — GERIATRIC DEPRESSION SCALE SHORT VERSION (GDS-SV)
DO YOU THINK THAT MOST PEOPLE ARE BETTER OFF THAN YOU ARE: NO
DO YOU OFTEN FEEL HELPLESS: NO
DO YOU FEEL FULL OF ENERGY: YES
DO YOU OFTEN GET BORED: NO
DO YOU PREFER TO STAY AT HOME, RATHER THAN GOING OUT AND DOING NEW THINGS: YES
DO YOU THINK IT IS WONDERFUL TO BE ALIVE NOW: YES
ARE YOU IN GOOD SPIRITS MOST OF THE TIME: YES
DO YOU FEEL THAT YOUR SITUATION IS HOPELESS: NO
DO YOU FEEL YOU HAVE MORE PROBLEMS WITH MEMORY THAN MOST: YES
ARE YOU AFRAID THAT SOMETHING BAD IS GOING TO HAPPEN TO YOU: NO
ARE YOU BASICALLY SATISFIED WITH YOUR LIFE: YES
GDS TOTAL SCORE: 2
DO YOU FEEL PRETTY WORTHLESS THE WAY YOU ARE NOW: NO
HAVE YOU DROPPED MANY OF YOUR ACTIVITIES AND INTERESTS?: NO
DO YOU FEEL HAPPY MOST OF THE TIME: YES
DO YOU FEEL THAT YOUR LIFE IS EMPTY: NO

## 2024-10-15 ASSESSMENT — ENCOUNTER SYMPTOMS
OCCASIONAL FEELINGS OF UNSTEADINESS: 1
LOSS OF SENSATION IN FEET: 0
DEPRESSION: 0
LOSS OF SENSATION IN FEET: 1
OCCASIONAL FEELINGS OF UNSTEADINESS: 0

## 2024-10-15 ASSESSMENT — PAIN SCALES - GENERAL: PAINLEVEL: 0-NO PAIN

## 2024-10-15 NOTE — PROGRESS NOTES
Patient ID: Gal Cramer is a 84 y.o. male who presents for geriatric evaluation.    Identifying Information                              : 1940           Assessment date: 10/17/2024    Patient accompanied by:   of 57 years, Wes Cavazos         History provided by:     CONCERNS IDENTIFIED BY CLINICAL SUPPORT STAFF (Safety Risks, Health Issues, POA not in Chart, etc)     1. Patient is currently at [facility] for re[facility], plans to transition home soon.  noted he has had the bathroom updated to support his needs and generally addressing any fall risks around the house like removing rugs. He also noted connecting with home health aides in case additional help is needed.      2. Patient is not able to remember about how to use his wound vac correctly    Social History    Level of Education: college graduate, Bachelors  Occupation/Work Status:         correction date (if applicable):      On any form of disability? no If so, explain:   Marital Status:                         Social History     Socioeconomic History    Marital status:    Tobacco Use    Smoking status: Never     Passive exposure: Never    Smokeless tobacco: Never   Vaping Use    Vaping status: Never Used   Substance and Sexual Activity    Alcohol use: Yes     Comment: OCCASIONALLY    Drug use: Never    Sexual activity: Defer     Social Drivers of Health     Financial Resource Strain: Low Risk  (9/10/2024)    Overall Financial Resource Strain (CARDIA)     Difficulty of Paying Living Expenses: Not hard at all   Food Insecurity: No Food Insecurity (10/16/2024)    Received from [facility]    Hunger Vital Sign     Worried About Running Out of Food in the Last Year: Never true     Ran Out of Food in the Last Year: Never true   Transportation Needs: No Transportation Needs (10/16/2024)    Received from [facility]    PRAPARE - Transportation     Lack of Transportation (Medical): No      "Lack of Transportation (Non-Medical): No   Physical Activity: Inactive (6/14/2024)    Received from [facility]    Exercise Vital Sign     Days of Exercise per Week: 0 days     Minutes of Exercise per Session: 0 min   Stress: No Stress Concern Present (7/26/2020)    Received from [facility]    [place] [employer] - Occupational Stress Questionnaire     Feeling of Stress : Only a little   Social Connections: Unknown (7/26/2020)    Received from [facility]    Social Connection and Isolation Panel [NHANES]     Frequency of Communication with Friends and Family: Twice a week     Frequency of Social Gatherings with Friends and Family: Twice a week   Housing Stability: Low Risk  (10/16/2024)    Received from [facility]    Housing Stability Vital Sign     Unable to Pay for Housing in the Last Year: No     Number of Times Moved in the Last Year: 1     Homeless in the Last Year: No        ENCOUNTER SCREENING RESULTS     MMSE (Mini Mental State Exam)  What is the Year, Season, Date, Day, and Month?: 0 correct  Where are we: State, Country, City, Hospital, Floor?: 4 correct  Name / Repeat 3 objects:( Apple, Table, Jes) or (Ball, Tree, Flag) : 3 correct  Serial 7's backwards or spell World backwards?: 2 correct  Recall the 3 objects from above (apple, table, jes) or (ball, tree, flag): 0 correct / Unable to score  Name the following: pencil, watch: 2 correct  Repeat the following: \"No ifs, ands, or buts.\": 1 correct  Follow these commands: Take a paper in your right hand, fold it in half, and put it on the floor.: 3 correct  \"Please read this and do what it says\" (Written instruction is \"Close your eyes.\"): 1 correct  Written instruction is \"Make up and write a sentence about anything.\" (This sentence must contain a noun and a verb.): 1 correct  Written instruction is: \"Please copy this picture.\" (All 10 angles must be present and two must intersect.): 0 correct  MMSE Total " Score:: 17    Geriatric Depression Scale (Short Version) Total: 2    Daily Functioning Assessment    ADL Screening  Patient's Vision Adequate to Safely Complete Daily Activities: Yes  Patient's Judgment Adequate to Safely Complete Daily Activities: Yes  Patient's Memory Adequate to Safely Complete Daily Activities: Yes  Patient Able to Express Needs/Desires: Yes  Which is your dominant hand?: Right  Dressing: Independent  Grooming: Independent  Feeding: Independent  Bathing: Independent  Toileting: Independent  In/Out Bed: Independent  Walks in Home: Independent  Weakness of Legs: None  Weakness of Arms/Hands: None  Hearing - Right Ear: Hearing aid  Hearing - Left Ear: Hearing aid     IADL's  Using Telephone: Independent  Grocery Shopping: Needs assistance  Preparing Meals: Needs assistance  Doing Housework: Independent ()  Laundry: Independent ( does laundry)  Taking Medication: Total care  Pill Box Used: Yes  Managing Finances: Independent  Using Transportation: Independent  Still Driving: No  Eating: Independent  Needs Assistance With Food: Independent  Difficulty Chewing or Swallowing: No     Safety Concerns  Safety Concerns: None      Advance Directives/Legal/Financial    Patient Healthcare POA:  Wes Cavazos         Is Healthcare POA currently scanned into patient's chart:  No but copies provided today which staff will scan into the chart       DPOA for finances: Wes Cavazos       Legal guardian:  NA     Living Will: yes     [place]? yes      Family History      Biological Mother(status, age at death, cause of death):  at age 85 from dementia- behavioral issue, in nursing home care at end of life     Biological Father(status, age at death, cause of death):  at 87 from cancer     Biological Siblings(status, age at death, cause of death): N/A, patient was an only child     Extended family members with relevant health history: none noted    Supportive Relationships  (Informal Support)     Spouse/partner information (include length of relationship, health status): Wes Cavazos,  of 57 years      Children Information (include location, level of engagement): N/A     Other social supports:  noted they have many friends who are local and supportive    Formal Supports     Engaged community services (Emergency Alert, HHA, MOW, Case Management, Etc.):   is preparing for patient's transition home.      Safety  Nutrition and Exercise  Current Diet: Well Balanced Diet  Adequate Fluid Intake: No  Caffeine: Yes (coffee 2-3 cups daily)  Appetite:: Good  Food Consistency:: Regular  Liquids Consistency:: Thin  Changes in Weight?: Yes (15 pound loss over one year)  Chewing or Swallowing Problems?: No  Exercise Frequency: No Exercise    Mental Health     Sleep: good sleep, 10:30PM to 7AM     Energy: good energy     Mood concerns noted by patient/family: balanced     Relevant mental health history: NA     Self-harm/suicidal thoughts, plan: None Reported     Interests/Hobbies/Activities/Daily Routine: time with  and friends, reading books, music, active in [employer]     Alcohol, illicit drug, and tobacco use reported by patient/family: 4 glasses of wine a week    Additional Notes or Follow-up Matters:  Patient was engaged and pleasant throughout the evaluation. He attempted to respond to questions but then engaged his  to provide more details and information about his personal background and health history. His  noted patient is anxious to return home from rehab and he has been working to prepare the home to support his needs, like adjustments to the bathroom and removing area rugs.  also noted he is connecting with home health services in case additional support is needed. He shared his main concern in the transition is patient is struggling with management of his wound vac. Patient has a strong support system in his  and friends.   noted interest in [employer] and asked to be followed-up with by Maryanne. I noted I would make Maryanne aware of his interest.

## 2024-10-15 NOTE — PROGRESS NOTES
Patient ID: Gal Cramer is a 84 y.o. male who presents for ***.    Identifying Information                              : 1940           Assessment date: 10/15/2024    Patient accompanied by: {NA or comment:43972}        History provided by: ***  In rehab since sin mid August  Symptoms Reported (include length of time): ***    CONCERNS IDENTIFIED BY CLINICAL SUPPORT STAFF (Safety Risks, Health Issues, POA not in Chart, etc)     1. ***     2.     3.    Social History    Level of Education: college graduate, Bachelor  Occupation/Work Status: retired from     residential date (if applicable):      On any form of disability? no If so, explain:   Marital Status:                         Social History     Socioeconomic History    Marital status:    Tobacco Use    Smoking status: Never     Passive exposure: Never    Smokeless tobacco: Never   Vaping Use    Vaping status: Never Used   Substance and Sexual Activity    Alcohol use: Yes     Comment: OCCASIONALLY    Drug use: Never    Sexual activity: Defer     Social Determinants of Health     Financial Resource Strain: Low Risk  (9/10/2024)    Overall Financial Resource Strain (CARDIA)     Difficulty of Paying Living Expenses: Not hard at all   Food Insecurity: No Food Insecurity (2019)    Received from [facility]    Hunger Vital Sign     Worried About Running Out of Food in the Last Year: Never true     Ran Out of Food in the Last Year: Never true   Transportation Needs: No Transportation Needs (9/10/2024)    PRAPARE - Transportation     Lack of Transportation (Medical): No     Lack of Transportation (Non-Medical): No   Physical Activity: Inactive (2024)    Received from [facility]    Exercise Vital Sign     Days of Exercise per Week: 0 days     Minutes of Exercise per Session: 0 min   Stress: No Stress Concern Present (2020)    Received from [facility]    [employer] - Occupational  Stress Questionnaire     Feeling of Stress : Only a little   Social Connections: Unknown (7/26/2020)    Received from [facility]    Social Connection and Isolation Panel [NHANES]     Frequency of Communication with Friends and Family: Twice a week     Frequency of Social Gatherings with Friends and Family: Twice a week   Housing Stability: Low Risk  (9/10/2024)    Housing Stability Vital Sign     Unable to Pay for Housing in the Last Year: No     Number of Times Moved in the Last Year: 1     Homeless in the Last Year: No        ENCOUNTER SCREENING RESULTS               Daily Functioning Assessment                       Advance Directives/Legal/Financial    Patient Healthcare POA:  Wes Cavazos,          Is Healthcare POA currently scanned into patient's chart: {NA or comment:47666}      DPOA for finances: {NA or comment:72343}       Legal guardian:  {NA or comment:20388}     Living Will: {yes no:392900}     ? {yes no:152690}      Family History      Biological Mother(status, age at death, cause of death):      Biological Father(status, age at death, cause of death):      Biological Siblings(status, age at death, cause of death):      Extended family members with relevant health history:     Supportive Relationships (Informal Support)     Spouse/partner information (include length of relationship, health status):      Children Information (include location, level of engagement):       Other social supports: Fitz Weaver   Formal Supports     Engaged community services (Emergency Alert, HHA, MOW, Case Management, Etc.): {NA or comment:57645}    Safety       Mental Health     Sleep: ***     Energy: ***     Mood concerns noted by patient/family: {NA or comment:04880}     Relevant mental health history: {NA or comment:50807}     Self-harm/suicidal thoughts, plan: {desc; suicide ideation:07130}     Interests/Hobbies/Activities/Daily Routine: musicm books, newspapers      Alcohol, illicit drug, and tobacco use reported by patient/family: ***    Additional Notes or Follow-up Matters: {NA or comment:33579}

## 2024-10-15 NOTE — PATIENT INSTRUCTIONS
When you go back home from [facility]  - stop donepezil     2. The memantine is just daily at bedtime    3. Mirtazapine is now at 15mg  - 1 tab at bedtime     4. Take the omeprazole (prilosec) just as needed for acid reflux    5. Try to schedule follow up visit with Dr. Murray  [phone]    6. Referral placed to  home care  - specifically noting needs wound vac dressing changes    7. Follow up visit in 3-4 months     8. Apply a moisturizing cream to both legs at least once daily

## 2024-10-16 ENCOUNTER — HOME HEALTH ADMISSION (OUTPATIENT)
Dept: HOME HEALTH SERVICES | Facility: HOME HEALTH | Age: 84
End: 2024-10-16
Payer: MEDICARE

## 2024-10-17 ENCOUNTER — DOCUMENTATION (OUTPATIENT)
Dept: HOME HEALTH SERVICES | Facility: HOME HEALTH | Age: 84
End: 2024-10-17
Payer: MEDICARE

## 2024-10-17 DIAGNOSIS — R33.9 URINARY RETENTION: Primary | ICD-10-CM

## 2024-10-17 DIAGNOSIS — S81.001A OPEN WOUND OF RIGHT KNEE, INITIAL ENCOUNTER: Primary | ICD-10-CM

## 2024-10-17 RX ORDER — BISMUTH TRIBROMOPH/PETROLATUM 4" X 4"
1 BANDAGE TOPICAL DAILY
Qty: 30 EACH | Refills: 0 | Status: SHIPPED | OUTPATIENT
Start: 2024-10-17

## 2024-10-17 NOTE — HH CARE COORDINATION
This referral has been made a Non Admit with  Home Care due to Patient not discharging from facility. If you have further questions, feel free to reach out to our office at [phone]. Thank you, [facility] Intake.

## 2024-10-21 DIAGNOSIS — S81.001D OPEN WOUND OF RIGHT KNEE, SUBSEQUENT ENCOUNTER: ICD-10-CM

## 2024-10-25 ENCOUNTER — TELEPHONE (OUTPATIENT)
Dept: PLASTIC SURGERY | Facility: CLINIC | Age: 84
End: 2024-10-25
Payer: MEDICARE

## 2024-10-25 NOTE — TELEPHONE ENCOUNTER
Spoke to Wes regarding Gal because he had questions regarding scheduling upcoming skin graft for patient. Wes said the wound has gotten much smaller and is healing on its own. He would like to continue with local wound care rather than doing a skin graft. I educated him on the possibility of infection and reviewed the most recent updated photos of the wound and the wound appears to have no signs or symptoms of infection and has gotten smaller. He has a physician at [facility] managing the wound and would like to continue under her care. Wes will reach out to our office if he decides a skin graft is in the patient's best interest in the future.   Patient with one or more new problems requiring additional work-up/treatment.

## 2024-11-06 ENCOUNTER — TELEPHONE (OUTPATIENT)
Dept: GERIATRIC MEDICINE | Facility: CLINIC | Age: 84
End: 2024-11-06
Payer: MEDICARE

## 2024-11-15 ENCOUNTER — APPOINTMENT (OUTPATIENT)
Dept: GERIATRIC MEDICINE | Facility: CLINIC | Age: 84
End: 2024-11-15
Payer: MEDICARE

## 2024-11-19 ENCOUNTER — NURSING HOME VISIT (OUTPATIENT)
Dept: POST ACUTE CARE | Facility: EXTERNAL LOCATION | Age: 84
End: 2024-11-19
Payer: MEDICARE

## 2024-11-19 VITALS
OXYGEN SATURATION: 95 % | SYSTOLIC BLOOD PRESSURE: 165 MMHG | RESPIRATION RATE: 16 BRPM | DIASTOLIC BLOOD PRESSURE: 81 MMHG | WEIGHT: 165.4 LBS | TEMPERATURE: 98 F | BODY MASS INDEX: 24.43 KG/M2

## 2024-11-19 DIAGNOSIS — R39.14 BENIGN PROSTATIC HYPERPLASIA WITH INCOMPLETE BLADDER EMPTYING: ICD-10-CM

## 2024-11-19 DIAGNOSIS — F03.90 DEMENTIA WITHOUT BEHAVIORAL DISTURBANCE (MULTI): ICD-10-CM

## 2024-11-19 DIAGNOSIS — N40.1 BENIGN PROSTATIC HYPERPLASIA WITH INCOMPLETE BLADDER EMPTYING: ICD-10-CM

## 2024-11-19 DIAGNOSIS — L89.312 PRESSURE INJURY OF RIGHT BUTTOCK, STAGE 2 (MULTI): ICD-10-CM

## 2024-11-19 DIAGNOSIS — S80.01XD TRAUMATIC HEMATOMA OF RIGHT KNEE, SUBSEQUENT ENCOUNTER: ICD-10-CM

## 2024-11-19 DIAGNOSIS — L89.322 PRESSURE INJURY OF LEFT BUTTOCK, STAGE 2 (MULTI): ICD-10-CM

## 2024-11-19 DIAGNOSIS — I10 PRIMARY HYPERTENSION: ICD-10-CM

## 2024-11-19 DIAGNOSIS — R53.81 DEBILITY: Primary | ICD-10-CM

## 2024-11-19 DIAGNOSIS — N18.32 STAGE 3B CHRONIC KIDNEY DISEASE (MULTI): ICD-10-CM

## 2024-11-19 PROCEDURE — 99316 NF DSCHRG MGMT 30 MIN+: CPT | Performed by: INTERNAL MEDICINE

## 2024-11-19 NOTE — ASSESSMENT & PLAN NOTE
-Resolving.  -Barrier cream 3 times daily at home.  Avoid prolonged sitting.  - and Home care RN to monitor

## 2024-11-19 NOTE — ASSESSMENT & PLAN NOTE
-BP high today but it has been 120s to 140s over 70s to 80s.  Continue to monitor  -Continue lisinopril 20 mg daily

## 2024-11-19 NOTE — ASSESSMENT & PLAN NOTE
-Wound healing very well.  -Home care RN to follow.  Was being followed by in-house wound care in the facility.  -Nursing to teach caregiver on wound care.  Current orders are:  GENTLY CLEANSE AREA WITH NS, PAT DRY, CUT HYDROFERA BLUE FOAM TO SIZE, MOISTEN WITH NS, SQUEEZE OUT EXCESS NS, COVER WITH FOAM DRSG, CHANGE DAILY AND PRN

## 2024-11-19 NOTE — ASSESSMENT & PLAN NOTE
- Creatinine has increased to 1.8 since September 2024.  Might be a new baseline.  -Continue to follow outpatient.

## 2024-11-19 NOTE — LETTER
"Patient: Gal Cramer  : 1940    Encounter Date: 2024    Service Date: 2024    Admission Date: 2024  Discharge Date: 2024    Level of Care: Respite care  SNF Attending: Tami Marroquin MD  Skilled unit resident care manager phone: [phone]  Skilled unit Fax: [phone]    Primary Care Physician: to be established with [facility] Course:   Admitted from: acute care hospital  Facility: [facility]   Dates of hospitalization: -24  Hospital Course:  \"admitted following unprovoked mechanical fall down 1 step resulting in R Knee hematoma which rapidly increased in size with a hemorrhagic bullae on the anteromedial portion of the R knee, causing lateralization of the patella. He did not endorse much pain over the area, and had some bruising both proximal and distal to the blister. Exam also notable for bruising on his right chin. Imaging in the ED was unremarkable except for soft tissue swelling indicative of hematoma on CT R knee. CT head, CT maxillofacial bone, CT C spine, XR knee, XR tibia fibula, XR femur, and XR hip and pelvis all unremarkable for acute abnormalities. Gait exam notable for some unsteadiness, and patient has had history of 3 falls in the past month. Labs notable for VEDA on CKD with Cr 1.73>2.06 after fluids (baseline 1.5), as well as asymptomatic bacteriuria on urinalysis (leuk esterase 500+; WBC 21-50). Patient straight caths himself at baseline, as was having some minor bleeding when doing this in hospital. Patient evaluated by orthopedic surgery for hematoma management, and taken for I&D of hematoma on . Postoperatively, patient was doing well and endorsed minimal pain. Anticoagulation was restarted with ASA 81 BID on POD1, and hemovac drain removed on .  Throughout admission, exam was also notable for dementia, with poor memory and recall of events leading up to the fall. This seemed to be at his baseline. " "Patient depends on  for help with memory and medical management.    Subacute Course:   During SNF stay, pt had hospitalizations and ER visits. See below:  Admitted from: acute care hospital  Facility: [facility]   Dates of hospitalization: 9/6/24-9/12/24  Hospital Course:  Discharge Diagnosis  Traumatic hematoma of right knee  Per discharge summary  \"Mr. Cramer is a 84 y.o. male with a hx of dementia, HTN, HLD, CKD 3, GERD, peripheral neuropathy, and BPH (-cath 3-4x every day)  who presented to the ED 9/6 from outpatient orthopedic office due to concern for recurrent right knee hematoma, admitted to Medicine 9/7 for elly-operative management.   Per chart review, admitted to [facility] 08/17-08/21 following unprovoked mechanical fall down 1 step resulting in R Knee hematoma which rapidly increased in size with a hemorrhagic bullae on the anteromedial portion of the R knee, causing lateralization of the patella. Patient evaluated by orthopedic surgery for hematoma management, and taken for I&D of hematoma on 8/19. Postoperatively, patient was doing well and endorsed minimal pain. DVT PPx was restarted with ASA 81 BID on POD1, and hemovac drain removed on 8/21. Discharged in stable condition to SNF.   Saw Orthopedic PA 09/06 for post-op check. At this time patient with increased swelling in right knee. Also with blister over the medial knee that popped a few days ago. Since blister has opened he has had a large wound to the medial knee. This has been managed with daily Xeroform dressings at SNF. Per Ortho appears that shortly after discharge patient had recurrence of his hematoma which lead to a swelling blister tat has since popped. The area of previous blistering is quite large and has eschar forming. Discussed with on-call surgeon and recommenced ER visit for admission for repeat I&D. Per documentation he could potentially also need plastics consult for wound management due to concern for skin " "viability.   On interview, patient is relatively asx. He denies pain to the knee. Per , reports that patient has had increased swelling and erythema surrounding the knee despite daily dressing changes and ACE wrap placement. Patient overtly denies fevers/chill and purulent drainage. He does report RLE tenderness to palpation. He denies chest pain and SOB. He has noted increased warmth to RLE.  On initial assessment pt was afebrile, HDS, with O2 sats appropriate on RA. Overall concerning for re-accumulation of right knee hematoma with overlying ulcer noted to be a ruptured blister. SIRS negative on admission, Blood cultures pending start IV Abx if positive. Successful I&D with ortho 9/7, intra-op cx pending. Pain has been controlled and patient allowed to weightbearing as tolerated. Pt has wound vac placed pending plastic surgery closure, Plastic Surgery recs: Plastic Surgery will Evaluate to decide on definitve vac management vs local advancement flap for tissue coverage. Holding lisinopril until after plastic surgery, if applicable. ANCEF completed 24 hours after surgery on 9/8.   Also, high clinical concern for RLE DVT at this time. Continuing home ASA for dvt ppx. DVT ultrasound not concerning for DVT 9/8. PT/OT following rec mod intensity. Plastic surgery with recommendations to allow wound to heal by secondary intention and follow up in 2 weeks.  Patient with episode of orthostatic hypotension without syncope 9/11 likely iso of dehydration. Patient responded well to 1L LR with no orthostatic hypotension morning of 9/12. Patient's clark removed upon discharge as he can straight cath and requests this. Discharged in stable condition to SNF with plans for plastic surgery and PCP follow up. \"    Admitted from: acute care hospital  Facility: [facility]  Dates of hospitalization: 9/25-9/28/24  Hospital Course:  Mr. Cramer is an 85 y/o man with h/o dementia, HTN, GERD, CKD stage 3, neurogenic bladder for " the past 12 years after a spermatocele surgery now straight caths himself 3-4 times per day.   [facility] after a traumatic hematoma in the right knee s/p evacuation and then leading to wound healing issues that are being taken care of at . Has a wound VAC in place. Per patient and , the wound looks clean and much improved.   He presented to the ER with bleeding from the urethra after straight cathing. Per  has happened before usually trauma from the catheter or a UTI. This time his UA is +ve and pt was started on Rocephin in the ER.   There was a concern for anemia and VEDA but looking at his old records and recent  admit, this seems to be within baseline range. Admitted to Medicine Service for further monitoring.  # Hematuria (POA: Yes)  # BPH with urination retention   -H/o neurogenic bladder/BPH  -Pt on straight cath schedule at home done by /patient  - agreeable to placing clakr 9/8 to reduce issues with delayed straight caths, pt forgets to cath himself as his  does it for him multiple times a day at home   -UA consistent with acute cystitis with hematuria  (Prior urine cultures Enterobacter) with encephalopathy.   Plan  -Got three doses of ceftriaxone   -Follow cultures-> klebsiella, Susceptible to cipro   -complete 4 more days of antibiotics with PO cipro   -QTc stable   -cont intermittent straight cath   -continue with home finasteride  #CKD3/HTN  seems to be at baseline SCr. No VEDA  - trend BMP PRN  - Continue Lisinopril 20 mg po qday  #Dementia  Continue Memantine once per day and Aricept. Please see Geriatrics notes from June. Per Dr. Banks was going to d/c Memantine but pt still on it.   -D/c Seroquel- pt not on it at home.   -cont Mirtazepine 7.5 mg qhs   -Not on Lexapro per   # Right knee hematoma, Status post incision and drainage x 2  # Knee pain   # R knee wound  # pressure injury to coccyx (POA)  - fell at home down 1 step on  "8/17. Found to have R knee hematoma causing lateralization of patella. Underwent I&D by ortho on 8/19   - had an area of bleeding medial to the drainage incisions. it blistered and skin became necrotic. Was readmitted on 9/6 and had I and D on 9/7. The necrotic skin was removed and has a wound vac. Will likely not need surgical wound closure. Has f/up with plastics surgery in 2 weeks.   Plan  - is on aspirin 81mg daily for DVT prophylaxis   - on tylenol 1000mg TID for pain   - plan to return to rehab for PT and wound care  - wound care consulted to assess wound vac  - Wound vac to be changed every 3 days, due today   - continue OP follow up with    # Anemia  - hemoglobin decreased from 12 to 9.2 during last hospitalization. Likely related to hematoma.   - now stable   Plan   -trend CBC PRN   # Depression   - cont escitalopram 10mg daily and mirtazapine 15mg daily.   # Vitamin D deficiency  - D level was 39.6 in 6/2024. Is on D3 2000 units daily. Will continue \"     Primary caregiver: patient and   : Wes Cavazos,            [phone]      10/15/2024 ER Visit at [facility] [place]: Diagnosis: Hematuria, VEDA, complicated UTI (discharged with 10 days of Keflex)    Pt is seen today in his room. Has no complaints. Denies pain in R knee. He is looking forward to going home.     Initial SBP today was in the 180s.  Went down to 165.  Patient denies headache, blurry vision, weakness.    Tests/Procedures:      Current Outpatient Medications   Medication Sig Dispense Refill   • acetaminophen (Tylenol) 500 mg tablet Take 2 tablets (1,000 mg) by mouth 3 times a day.     • adhesive bandage 6 X 6 \" bandage Apply 1 each topically once daily. if the wound vac stops working or the wound vac dressing comes off, then apply a xeroform dressing to the wound bed and cover with a foam dressing and change daily (Patient not taking: Reported on 11/19/2024) 30 each 0   • ammonium lactate (Amlactin) 12 % cream Apply 1 " "Application topically 2 times a day.     • aspirin 81 mg EC tablet Take 1 tablet (81 mg) by mouth once daily.     • bismuth tribrom-petrolatum,wh (Xeroform Petrolatum Dressing) 4 X 4 \" bandage Apply 1 each topically once daily. if the wound vac stops working or the wound vac dressing comes off, then apply a xeroform dressing to the wound bed and cover with a foam dressing and change daily (Patient not taking: Reported on 11/19/2024) 30 each 0   • cholecalciferol (Vitamin D3) 25 MCG (1000 UT) tablet Take 2 tablets (50 mcg) by mouth once daily.     • escitalopram (Lexapro) 10 mg tablet Take 1 tablet (10 mg) by mouth once daily.     • finasteride (Proscar) 5 mg tablet Take 1 tablet (5 mg) by mouth once daily.     • lisinopril 20 mg tablet Take 1 tablet (20 mg) by mouth once daily.     • memantine (Namenda) 10 mg tablet Take 1 tablet (10 mg) by mouth once daily at bedtime.     • mirtazapine (Remeron) 15 mg tablet Take 1 tablet (15 mg) by mouth once daily at bedtime. 90 tablet 3   • pravastatin (Pravachol) 10 mg tablet Take 1 tablet (10 mg) by mouth once a day on Monday, Wednesday, and Friday.       No current facility-administered medications for this visit.         Discharge Physical Exam:     Vitals:    11/19/24 1808   BP: 165/81   Resp: 16   Temp: 36.7 °C (98 °F)   SpO2: 95%     Physical Exam  Vitals and nursing note reviewed.   Constitutional:       General: He is not in acute distress.     Appearance: Normal appearance. He is not ill-appearing.   HENT:      Head: Normocephalic and atraumatic.      Right Ear: External ear normal.      Left Ear: External ear normal.      Nose: Nose normal.      Mouth/Throat:      Mouth: Mucous membranes are moist.      Pharynx: Oropharynx is clear.   Eyes:      Extraocular Movements: Extraocular movements intact.      Conjunctiva/sclera: Conjunctivae normal.      Pupils: Pupils are equal, round, and reactive to light.   Cardiovascular:      Rate and Rhythm: Normal rate and regular " rhythm.      Pulses: Normal pulses.      Heart sounds: Normal heart sounds. No murmur heard.  Pulmonary:      Effort: Pulmonary effort is normal.      Breath sounds: Normal breath sounds.   Abdominal:      General: Bowel sounds are normal.      Palpations: Abdomen is soft.   Musculoskeletal:         General: Normal range of motion.      Cervical back: Normal range of motion.      Right lower leg: No edema.      Left lower leg: No edema.   Skin:     General: Skin is warm and dry.      Comments: See picture of R medial knee wound: surrounding skin c/d/I, base of wound is beefy red. Wound diameter is around 1 inches  Buttock pressure ulcer: Scabbing, has post inflammatory hyperpigmentation   Neurological:      Mental Status: He is alert.   Psychiatric:         Mood and Affect: Mood normal.         Behavior: Behavior normal.         Thought Content: Thought content normal.          Assessment and plan:  Problem List Items Addressed This Visit          Cardiac and Vasculature    Primary hypertension     -BP high today but it has been 120s to 140s over 70s to 80s.  Continue to monitor  -Continue lisinopril 20 mg daily            Genitourinary and Reproductive    Benign prostatic hyperplasia with incomplete bladder emptying     - was self cathing patient at home.  Now on Booker.  -Nursing to educate  on how to care for Booker catheter.  -Has appointment with urology on 12/4 and also 1/28 (Dr. Monte)  -Continue finasteride 5 mg daily         CKD (chronic kidney disease) stage 3, GFR 30-59 ml/min (Multi)     - Creatinine has increased to 1.8 since September 2024.  Might be a new baseline.  -Continue to follow outpatient.            Musculoskeletal and Injuries    Pressure injury of right buttock, stage 2 (Multi)     -Management as with the above diagnosis         Pressure injury of left buttock, stage 2 (Multi)     -Resolving.  -Barrier cream 3 times daily at home.  Avoid prolonged sitting.  - and Home  care RN to monitor         Traumatic hematoma of right knee     -Wound healing very well.  -Home care RN to follow.  Was being followed by in-house wound care in the facility.  -Nursing to teach caregiver on wound care.  Current orders are:  GENTLY CLEANSE AREA WITH NS, PAT DRY, CUT HYDROFERA BLUE FOAM TO SIZE, MOISTEN WITH NS, SQUEEZE OUT EXCESS NS, COVER WITH FOAM DRSG, CHANGE DAILY AND PRN            Neuro    Dementia without behavioral disturbance (Multi)     Continue memantine 10 mg daily follow-up with Dr. Marroquin            Symptoms and Signs    Debility - Primary       Mr. Cramer 84 y.o. year old male is homebound due to debility and dementia, and leaving the home (no more than once per week) requires considerable taxing effort.       Home care services needed: PT/OT, RN, SW    Discharge Condition: stable.   Discharge Disposition:  May be discharged to home tomorrow with .   Discharge Meds:      Discharge Instructions:  - See above.      Discussed case with: patient,  Wes, facility staff and Geriatric care team.   Counseled in great detail  Wes of the above.     Transitions of Care Critical Issues: none  Imaging follow-up: none  Lab Monitoring Needed: renal function  Specialists Follow-Up Needed:  none  Code status on discharge: DNR-CCA, no intubation    Future Appointments:   As above.    to call [facility] (phone number given) to establish care.     Time Spent  Prep time on day of patient encounter: 5 minutes  Time spent directly with patient, family or caregiver: 30 minutes  Additional Time Spent on Patient Care Activities: 5 minutes  Documentation Time: 7 minutes  Other Time Spent: 0 minutes  Total: 47 minutes      Electronically signed by: Melani Workman MD    Electronically Signed By: Melani Workman MD   11/19/24  6:11 PM

## 2024-11-19 NOTE — ASSESSMENT & PLAN NOTE
- was self cathing patient at home.  Now on Booker.  -Nursing to educate  on how to care for Booker catheter.  -Has appointment with urology on 12/4 and also 1/28 (Dr. Monte)  -Continue finasteride 5 mg daily

## 2024-11-19 NOTE — PROGRESS NOTES
"Service Date: 11/19/2024    Admission Date: 8/21/2024  Discharge Date: 11/20/2024    Level of Care: Respite care  SNF Attending: Tami Marroquin MD  Skilled unit resident care manager phone: [phone]  Skilled unit Fax: [phone]    Primary Care Physician: to be established with [facility]    Hospital Course:   Admitted from: acute care hospital  Facility: [facility]   Dates of hospitalization: 8/17-8/21/24  Hospital Course:  \"admitted following unprovoked mechanical fall down 1 step resulting in R Knee hematoma which rapidly increased in size with a hemorrhagic bullae on the anteromedial portion of the R knee, causing lateralization of the patella. He did not endorse much pain over the area, and had some bruising both proximal and distal to the blister. Exam also notable for bruising on his right chin. Imaging in the ED was unremarkable except for soft tissue swelling indicative of hematoma on CT R knee. CT head, CT maxillofacial bone, CT C spine, XR knee, XR tibia fibula, XR femur, and XR hip and pelvis all unremarkable for acute abnormalities. Gait exam notable for some unsteadiness, and patient has had history of 3 falls in the past month. Labs notable for VEDA on CKD with Cr 1.73>2.06 after fluids (baseline 1.5), as well as asymptomatic bacteriuria on urinalysis (leuk esterase 500+; WBC 21-50). Patient straight caths himself at baseline, as was having some minor bleeding when doing this in hospital. Patient evaluated by orthopedic surgery for hematoma management, and taken for I&D of hematoma on 8/19. Postoperatively, patient was doing well and endorsed minimal pain. Anticoagulation was restarted with ASA 81 BID on POD1, and hemovac drain removed on 8/21.  Throughout admission, exam was also notable for dementia, with poor memory and recall of events leading up to the fall. This seemed to be at his baseline. Patient depends on  for help with memory and medical " "management.    Subacute Course:   During SNF stay, pt had hospitalizations and ER visits. See below:  Admitted from: acute care hospital  Facility: [facility]   Dates of hospitalization: 9/6/24-9/12/24  Hospital Course:  Discharge Diagnosis  Traumatic hematoma of right knee  Per discharge summary  \"Mr. Cramer is a 84 y.o. male with a hx of dementia, HTN, HLD, CKD 3, GERD, peripheral neuropathy, and BPH (-cath 3-4x every day)  who presented to the ED 9/6 from outpatient orthopedic office due to concern for recurrent right knee hematoma, admitted to Medicine 9/7 for elly-operative management.   Per chart review, admitted to [facility] 08/17-08/21 following unprovoked mechanical fall down 1 step resulting in R Knee hematoma which rapidly increased in size with a hemorrhagic bullae on the anteromedial portion of the R knee, causing lateralization of the patella. Patient evaluated by orthopedic surgery for hematoma management, and taken for I&D of hematoma on 8/19. Postoperatively, patient was doing well and endorsed minimal pain. DVT PPx was restarted with ASA 81 BID on POD1, and hemovac drain removed on 8/21. Discharged in stable condition to SNF.   Saw Orthopedic PA 09/06 for post-op check. At this time patient with increased swelling in right knee. Also with blister over the medial knee that popped a few days ago. Since blister has opened he has had a large wound to the medial knee. This has been managed with daily Xeroform dressings at [facility]. Per Ortho appears that shortly after discharge patient had recurrence of his hematoma which lead to a swelling blister tat has since popped. The area of previous blistering is quite large and has eschar forming. Discussed with on-call surgeon and recommenced ER visit for admission for repeat I&D. Per documentation he could potentially also need plastics consult for wound management due to concern for skin viability.   On interview, patient is relatively asx. He " "denies pain to the knee. Per , reports that patient has had increased swelling and erythema surrounding the knee despite daily dressing changes and ACE wrap placement. Patient overtly denies fevers/chill and purulent drainage. He does report RLE tenderness to palpation. He denies chest pain and SOB. He has noted increased warmth to RLE.  On initial assessment pt was afebrile, HDS, with O2 sats appropriate on RA. Overall concerning for re-accumulation of right knee hematoma with overlying ulcer noted to be a ruptured blister. SIRS negative on admission, Blood cultures pending start IV Abx if positive. Successful I&D with ortho 9/7, intra-op cx pending. Pain has been controlled and patient allowed to weightbearing as tolerated. Pt has wound vac placed pending plastic surgery closure, Plastic Surgery recs: Plastic Surgery will Evaluate to decide on definitve vac management vs local advancement flap for tissue coverage. Holding lisinopril until after plastic surgery, if applicable. ANCEF completed 24 hours after surgery on 9/8.   Also, high clinical concern for RLE DVT at this time. Continuing home ASA for dvt ppx. DVT ultrasound not concerning for DVT 9/8. PT/OT following rec mod intensity. Plastic surgery with recommendations to allow wound to heal by secondary intention and follow up in 2 weeks.  Patient with episode of orthostatic hypotension without syncope 9/11 likely iso of dehydration. Patient responded well to 1L LR with no orthostatic hypotension morning of 9/12. Patient's clark removed upon discharge as he can straight cath and requests this. Discharged in stable condition to SNF with plans for plastic surgery and PCP follow up. \"    Admitted from: acute care hospital  Facility: [facility]  Dates of hospitalization: 9/25-9/28/24  Hospital Course:  Mr. Cramer is an 83 y/o man with h/o dementia, HTN, GERD, CKD stage 3, neurogenic bladder for the past 12 years after a spermatocele surgery now " straight caths himself 3-4 times per day.   He is currently at [facility] after a traumatic hematoma in the right knee s/p evacuation and then leading to wound healing issues that are being taken care of at . Has a wound VAC in place. Per patient and , the wound looks clean and much improved.   He presented to the ER with bleeding from the urethra after straight cathing. Per  has happened before usually trauma from the catheter or a UTI. This time his UA is +ve and pt was started on Rocephin in the ER.   There was a concern for anemia and VEDA but looking at his old records and recent  admit, this seems to be within baseline range. Admitted to Medicine Service for further monitoring.  # Hematuria (POA: Yes)  # BPH with urination retention   -H/o neurogenic bladder/BPH  -Pt on straight cath schedule at home done by /patient  - agreeable to placing clark 9/8 to reduce issues with delayed straight caths, pt forgets to cath himself as his  does it for him multiple times a day at home   -UA consistent with acute cystitis with hematuria  (Prior urine cultures Enterobacter) with encephalopathy.   Plan  -Got three doses of ceftriaxone   -Follow cultures-> klebsiella, Susceptible to cipro   -complete 4 more days of antibiotics with PO cipro   -QTc stable   -cont intermittent straight cath   -continue with home finasteride  #CKD3/HTN  seems to be at baseline SCr. No VEDA  - trend BMP PRN  - Continue Lisinopril 20 mg po qday  #Dementia  Continue Memantine once per day and Aricept. Please see Geriatrics notes from June. Per Dr. Banks was going to d/c Memantine but pt still on it.   -D/c Seroquel- pt not on it at home.   -cont Mirtazepine 7.5 mg qhs   -Not on Lexapro per   # Right knee hematoma, Status post incision and drainage x 2  # Knee pain   # R knee wound  # pressure injury to coccyx (POA)  - fell at home down 1 step on 8/17. Found to have R knee hematoma causing  "lateralization of patella. Underwent I&D by ortho on 8/19   - had an area of bleeding medial to the drainage incisions. it blistered and skin became necrotic. Was readmitted on 9/6 and had I and D on 9/7. The necrotic skin was removed and has a wound vac. Will likely not need surgical wound closure. Has f/up with plastics surgery in 2 weeks.   Plan  - is on aspirin 81mg daily for DVT prophylaxis   - on tylenol 1000mg TID for pain   - plan to return to rehab for PT and wound care  - wound care consulted to assess wound vac  - Wound vac to be changed every 3 days, due today   - continue OP follow up with    # Anemia  - hemoglobin decreased from 12 to 9.2 during last hospitalization. Likely related to hematoma.   - now stable   Plan   -trend CBC PRN   # Depression   - cont escitalopram 10mg daily and mirtazapine 15mg daily.   # Vitamin D deficiency  - D level was 39.6 in 6/2024. Is on D3 2000 units daily. Will continue \"     Primary caregiver: patient and   : Wes Santanaker,            [phone]      10/15/2024 ER Visit at [facility] [place]: Diagnosis: Hematuria, VEDA, complicated UTI (discharged with 10 days of Keflex)    Pt is seen today in his room. Has no complaints. Denies pain in R knee. He is looking forward to going home.     Initial SBP today was in the 180s.  Went down to 165.  Patient denies headache, blurry vision, weakness.    Tests/Procedures:      Current Outpatient Medications   Medication Sig Dispense Refill    acetaminophen (Tylenol) 500 mg tablet Take 2 tablets (1,000 mg) by mouth 3 times a day.      adhesive bandage 6 X 6 \" bandage Apply 1 each topically once daily. if the wound vac stops working or the wound vac dressing comes off, then apply a xeroform dressing to the wound bed and cover with a foam dressing and change daily (Patient not taking: Reported on 11/19/2024) 30 each 0    ammonium lactate (Amlactin) 12 % cream Apply 1 Application topically 2 times a day.      " "aspirin 81 mg EC tablet Take 1 tablet (81 mg) by mouth once daily.      bismuth tribrom-petrolatum,wh (Xeroform Petrolatum Dressing) 4 X 4 \" bandage Apply 1 each topically once daily. if the wound vac stops working or the wound vac dressing comes off, then apply a xeroform dressing to the wound bed and cover with a foam dressing and change daily (Patient not taking: Reported on 11/19/2024) 30 each 0    cholecalciferol (Vitamin D3) 25 MCG (1000 UT) tablet Take 2 tablets (50 mcg) by mouth once daily.      escitalopram (Lexapro) 10 mg tablet Take 1 tablet (10 mg) by mouth once daily.      finasteride (Proscar) 5 mg tablet Take 1 tablet (5 mg) by mouth once daily.      lisinopril 20 mg tablet Take 1 tablet (20 mg) by mouth once daily.      memantine (Namenda) 10 mg tablet Take 1 tablet (10 mg) by mouth once daily at bedtime.      mirtazapine (Remeron) 15 mg tablet Take 1 tablet (15 mg) by mouth once daily at bedtime. 90 tablet 3    pravastatin (Pravachol) 10 mg tablet Take 1 tablet (10 mg) by mouth once a day on Monday, Wednesday, and Friday.       No current facility-administered medications for this visit.         Discharge Physical Exam:     Vitals:    11/19/24 1808   BP: 165/81   Resp: 16   Temp: 36.7 °C (98 °F)   SpO2: 95%     Physical Exam  Vitals and nursing note reviewed.   Constitutional:       General: He is not in acute distress.     Appearance: Normal appearance. He is not ill-appearing.   HENT:      Head: Normocephalic and atraumatic.      Right Ear: External ear normal.      Left Ear: External ear normal.      Nose: Nose normal.      Mouth/Throat:      Mouth: Mucous membranes are moist.      Pharynx: Oropharynx is clear.   Eyes:      Extraocular Movements: Extraocular movements intact.      Conjunctiva/sclera: Conjunctivae normal.      Pupils: Pupils are equal, round, and reactive to light.   Cardiovascular:      Rate and Rhythm: Normal rate and regular rhythm.      Pulses: Normal pulses.      Heart " sounds: Normal heart sounds. No murmur heard.  Pulmonary:      Effort: Pulmonary effort is normal.      Breath sounds: Normal breath sounds.   Abdominal:      General: Bowel sounds are normal.      Palpations: Abdomen is soft.   Musculoskeletal:         General: Normal range of motion.      Cervical back: Normal range of motion.      Right lower leg: No edema.      Left lower leg: No edema.   Skin:     General: Skin is warm and dry.      Comments: See picture of R medial knee wound: surrounding skin c/d/I, base of wound is beefy red. Wound diameter is around 1 inches  Buttock pressure ulcer: Scabbing, has post inflammatory hyperpigmentation   Neurological:      Mental Status: He is alert.   Psychiatric:         Mood and Affect: Mood normal.         Behavior: Behavior normal.         Thought Content: Thought content normal.          Assessment and plan:  Problem List Items Addressed This Visit          Cardiac and Vasculature    Primary hypertension     -BP high today but it has been 120s to 140s over 70s to 80s.  Continue to monitor  -Continue lisinopril 20 mg daily            Genitourinary and Reproductive    Benign prostatic hyperplasia with incomplete bladder emptying     - was self cathing patient at home.  Now on Booker.  -Nursing to educate  on how to care for Booker catheter.  -Has appointment with urology on 12/4 and also 1/28 (Dr. Monte)  -Continue finasteride 5 mg daily         CKD (chronic kidney disease) stage 3, GFR 30-59 ml/min (Multi)     - Creatinine has increased to 1.8 since September 2024.  Might be a new baseline.  -Continue to follow outpatient.            Musculoskeletal and Injuries    Pressure injury of right buttock, stage 2 (Multi)     -Management as with the above diagnosis         Pressure injury of left buttock, stage 2 (Multi)     -Resolving.  -Barrier cream 3 times daily at home.  Avoid prolonged sitting.  - and Home care RN to monitor         Traumatic hematoma of  right knee     -Wound healing very well.  -Home care RN to follow.  Was being followed by in-house wound care in the facility.  -Nursing to teach caregiver on wound care.  Current orders are:  GENTLY CLEANSE AREA WITH NS, PAT DRY, CUT HYDROFERA BLUE FOAM TO SIZE, MOISTEN WITH NS, SQUEEZE OUT EXCESS NS, COVER WITH FOAM DRSG, CHANGE DAILY AND PRN            Neuro    Dementia without behavioral disturbance (Multi)     Continue memantine 10 mg daily follow-up with Dr. Marroquin            Symptoms and Signs    Debility - Primary       Mr. Cramer 84 y.o. year old male is homebound due to debility and dementia, and leaving the home (no more than once per week) requires considerable taxing effort.       Home care services needed: PT/OT, RN, SW    Discharge Condition: stable.   Discharge Disposition:  May be discharged to home tomorrow with .   Discharge Meds:      Discharge Instructions:  - See above.      Discussed case with: patient,  Wes, facility staff and Geriatric care team.   Counseled in great detail  Wes of the above.     Transitions of Care Critical Issues: none  Imaging follow-up: none  Lab Monitoring Needed: renal function  Specialists Follow-Up Needed:  none  Code status on discharge: DNR-CCA, no intubation    Future Appointments:   As above.    to call [facility] (phone number given) to establish care.     Time Spent  Prep time on day of patient encounter: 5 minutes  Time spent directly with patient, family or caregiver: 30 minutes  Additional Time Spent on Patient Care Activities: 5 minutes  Documentation Time: 7 minutes  Other Time Spent: 0 minutes  Total: 47 minutes      Electronically signed by: Melani Workman MD

## 2024-11-21 ENCOUNTER — TELEPHONE (OUTPATIENT)
Dept: GERIATRIC MEDICINE | Facility: CLINIC | Age: 84
End: 2024-11-21
Payer: MEDICARE

## 2024-12-26 PROCEDURE — G0180 MD CERTIFICATION HHA PATIENT: HCPCS | Performed by: INTERNAL MEDICINE

## 2025-01-28 ENCOUNTER — OFFICE VISIT (OUTPATIENT)
Dept: GERIATRIC MEDICINE | Facility: CLINIC | Age: 85
End: 2025-01-28
Payer: MEDICARE

## 2025-01-28 VITALS
HEART RATE: 71 BPM | BODY MASS INDEX: 25.96 KG/M2 | TEMPERATURE: 97.2 F | WEIGHT: 175.8 LBS | DIASTOLIC BLOOD PRESSURE: 77 MMHG | SYSTOLIC BLOOD PRESSURE: 134 MMHG

## 2025-01-28 DIAGNOSIS — F32.5 MAJOR DEPRESSIVE DISORDER IN REMISSION, UNSPECIFIED WHETHER RECURRENT (CMS-HCC): ICD-10-CM

## 2025-01-28 DIAGNOSIS — Z87.19 HISTORY OF GASTROESOPHAGEAL REFLUX (GERD): ICD-10-CM

## 2025-01-28 DIAGNOSIS — R31.9 HEMATURIA, UNSPECIFIED TYPE: ICD-10-CM

## 2025-01-28 DIAGNOSIS — E55.9 VITAMIN D DEFICIENCY: ICD-10-CM

## 2025-01-28 DIAGNOSIS — Z00.00 HEALTH CARE MAINTENANCE: ICD-10-CM

## 2025-01-28 DIAGNOSIS — N18.30 STAGE 3 CHRONIC KIDNEY DISEASE, UNSPECIFIED WHETHER STAGE 3A OR 3B CKD (MULTI): ICD-10-CM

## 2025-01-28 DIAGNOSIS — S81.001D OPEN WOUND OF RIGHT KNEE, SUBSEQUENT ENCOUNTER: ICD-10-CM

## 2025-01-28 DIAGNOSIS — I10 PRIMARY HYPERTENSION: ICD-10-CM

## 2025-01-28 DIAGNOSIS — R63.4 WEIGHT LOSS: ICD-10-CM

## 2025-01-28 DIAGNOSIS — F03.B0: ICD-10-CM

## 2025-01-28 DIAGNOSIS — R33.9 URINARY RETENTION: Primary | ICD-10-CM

## 2025-01-28 DIAGNOSIS — D64.9 ANEMIA, UNSPECIFIED TYPE: ICD-10-CM

## 2025-01-28 DIAGNOSIS — R04.0 EPISTAXIS: ICD-10-CM

## 2025-01-28 PROCEDURE — 99214 OFFICE O/P EST MOD 30 MIN: CPT | Performed by: INTERNAL MEDICINE

## 2025-01-28 PROCEDURE — G2211 COMPLEX E/M VISIT ADD ON: HCPCS | Performed by: INTERNAL MEDICINE

## 2025-01-28 PROCEDURE — 3075F SYST BP GE 130 - 139MM HG: CPT | Performed by: INTERNAL MEDICINE

## 2025-01-28 PROCEDURE — 3078F DIAST BP <80 MM HG: CPT | Performed by: INTERNAL MEDICINE

## 2025-01-28 PROCEDURE — 1126F AMNT PAIN NOTED NONE PRSNT: CPT | Performed by: INTERNAL MEDICINE

## 2025-01-28 PROCEDURE — 1159F MED LIST DOCD IN RCRD: CPT | Performed by: INTERNAL MEDICINE

## 2025-01-28 ASSESSMENT — PATIENT HEALTH QUESTIONNAIRE - PHQ9
1. LITTLE INTEREST OR PLEASURE IN DOING THINGS: NOT AT ALL
SUM OF ALL RESPONSES TO PHQ9 QUESTIONS 1 AND 2: 0
1. LITTLE INTEREST OR PLEASURE IN DOING THINGS: NOT AT ALL
2. FEELING DOWN, DEPRESSED OR HOPELESS: NOT AT ALL
SUM OF ALL RESPONSES TO PHQ9 QUESTIONS 1 AND 2: 0
2. FEELING DOWN, DEPRESSED OR HOPELESS: NOT AT ALL

## 2025-01-28 ASSESSMENT — ENCOUNTER SYMPTOMS
DEPRESSION: 0
LOSS OF SENSATION IN FEET: 0
OCCASIONAL FEELINGS OF UNSTEADINESS: 0

## 2025-01-28 ASSESSMENT — PAIN SCALES - GENERAL: PAINLEVEL_OUTOF10: 0-NO PAIN

## 2025-01-28 NOTE — PROGRESS NOTES
"Subjective   Mr. Cramer is 84 y.o. year old male and here for f/u of dementia, epistaxis, hematuria and urinary retention requiring straight catheterization and clark catheter, anemia, ckd, R knee hematoma wound (now healed), htn, weight loss, hyperlipidemia, GERD, vitamin D deficiency, low back pain. Here with  Max.    Last visit 10/15/24  Per pt discussion/summary:   \"When you go back home from [facility]  - stop donepezil   2. The memantine is just daily at bedtime  3. Mirtazapine is now at 15mg  - 1 tab at bedtime   4. Take the omeprazole (prilosec) just as needed for acid reflux  5. Try to schedule follow up visit with Dr. Murray  [phone]  6. Referral placed to  home care  - specifically noting needs wound vac dressing changes  7. Follow up visit in 3-4 months   8. Apply a moisturizing cream to both legs at least once daily      Discharged from [facility] 11/20/24  \"             Cardiac and Vasculature     Primary hypertension       -BP high today but it has been 120s to 140s over 70s to 80s.  Continue to monitor  -Continue lisinopril 20 mg daily                Genitourinary and Reproductive     Benign prostatic hyperplasia with incomplete bladder emptying       - was self cathing patient at home.  Now on Clark.  -Nursing to educate  on how to care for Clark catheter.  -Has appointment with urology on 12/4 and also 1/28 (Dr. Monte)  -Continue finasteride 5 mg daily           CKD (chronic kidney disease) stage 3, GFR 30-59 ml/min (Multi)       - Creatinine has increased to 1.8 since September 2024.  Might be a new baseline.  -Continue to follow outpatient.                Musculoskeletal and Injuries     Pressure injury of right buttock, stage 2 (Multi)       -Management as with the above diagnosis           Pressure injury of left buttock, stage 2 (Multi)       -Resolving.  -Barrier cream 3 times daily at home.  Avoid prolonged sitting.  - and Home care RN to monitor           " "Traumatic hematoma of right knee       -Wound healing very well.  -Home care RN to follow.  Was being followed by in-house wound care in the facility.  -Nursing to teach caregiver on wound care.  Current orders are:  GENTLY CLEANSE AREA WITH NS, PAT DRY, CUT HYDROFERA BLUE FOAM TO SIZE, MOISTEN WITH NS, SQUEEZE OUT EXCESS NS, COVER WITH FOAM DRSG, CHANGE DAILY AND PRN      \"  Went to urgent care 12/28 for URI  \"1. Acute upper respiratory infection, unspecified - ICD9: 465.9, ICD10: J06.9 (primary diagnosis)  Patient is afebrile, hemodynamically stable, nontoxic appearing in room - negative red flags.     - COVID, RSV, and Influenza A&B swab obtained  - Release results to [facility] and alter plan of care if indicated  - Refused in office x-ray imaging at this time    Discussed with patient symptoms consistent with viral etiology, therefore, at this time, no antibiotic indicated. Reviewed the importance of generalized supportive measures via: Rest, perform thorough hand hygiene frequently, keep common surfaces clean, cover your mouth and nose when you cough or sneeze, ensure adequate hydration, warm Chamomile tea with honey and lemon, humidifier, avoid allergy triggers, and use Dilan's Vapor Rub, as needed, on chest or as instructed on package insert. May take over the counter Tylenol, as needed, for headache/fever/pain relief.    All questions and concerns addressed. Encouraged to call office with any questions. Continue to monitor your sleep, appetite, energy level, intake and output. Return to clinic as needed. Follow-up with Primary Care Provider, Family Medicine Provider, or Express/Urgent Care Clinic for re-evaluation within 1 week if symptoms persist, sooner if symptoms worsen. Call [phone] in order to schedule follow-up appointment. Go to the Emergency Room should: Fever, chills, confusion, inability to stay awake, extreme weakness, sudden vision changes, dizziness, drooling, choking sensation, difficulty " "swallowing or breathing, shortness of breath, chest pain, sensation of heart skipping a beat, abdominal pain, inability to tolerate oral fluids/food, or if other concerning symptoms arise. Patient and partner agree with plan and verbalize understanding.  2. Acute cough - ICD9: 786.2, ICD10: R05.1 \"    Saw ENT 1/9/25  \"Dx: Epistaxis   NeoSynephrine HCL 0.5% Nasal Decongestant 2-3 sprays each nostril q4h x 3 days  Nose clamp placed and has no active bleeding at this point in time. Pt is going to follow-up with ENT today for further evaluation. Pt is hemodynamically stable. Pt instructed to report to ED if develop HA, uncontrolled bleeding, persistent bleeding, N/V, difficulty swallowing or any new symptoms. \"    HPI     Per patient and  (obtained in addition due to dementia)  - saw Dr. Hunt today at 8am. Had clark catheter change  - he is recommending suprapubic catheter  - no hematuria with clark currently. Still has clark  - can't do intermittent straight catheterization any more because of frequent hematuria with it  -  has a routine of changing the clark bag.   - knee wound healed  - jarad still comes couple times of week and gives him a shower  - max has someone doing laundry.   - main issue is cooking. Eat out a lot. And stouffers.   - will likely stop seeing Dr. Najera  - memory mostly stable. Learning new things difficult. But overall doing well.   -  doing the clark bag and medicine. Pt can still wash himself up.   -  has had a bad cold. Still coughing. Gets into coughing fits. For couple of weaks.   - has tickle at bottom of throat. Only way can get rid of it is by coughing heavy  - cough is worse in middle of night or first thing in morning.   - has humidifier in living room, mainly for the piano  - nose runs. Nose is stuffed up in morning.   - weight up 12 lbs since rehab. And up 6 lbs compared to last visit with me  - getting boost once daily   - not checking bp at " home  - mood good for most part. Gets bored being inside due to weather     [facility]      Is dependant or requires assistance in the following BADL: forgets to detach his wound vac or forgets to remove the   Is dependant or requires assistance in the following Instrumental ADL: all      Advanced Directives on file: is DNRcc-A. Need to get a copy of his HCPOA and living will and dnr form for EPIC        Medications reviewed and reconciled.     Objective   /77   Pulse 71   Temp 36.2 °C (97.2 °F) (Tympanic)   Wt 79.7 kg (175 lb 12.8 oz)   BMI 25.96 kg/m²     Physical Exam  Constitutional: No Acute Distress; Well Kempt  Eyes: PERRLA, EOMI  Ears: auditory canals clear, TM's +LR b/l  ENT: Pharynx clear, neck supple  Lymphatic: No anterior cervical, supraclavicular adenopathy  Cardiovascular: RRR, +S1, S2, no murmurs appreciated, physiologic JVD.  Extremities: no cyanosis, clubbing. 1+ edema L>R. Pedal pulses intact  Respiratory: clear without rales, rhonchi or wheezes  Gastrointestinal: +BS, soft, nontender  Genitourinary: not examined  Musculoskeletal: slight kyphosis of spine  Integumentary: skin warm, no tenting. Dry, scaled, scarred area on R medial knee  Neurological: non-focal deficit. Get-up-and-go: slightly pushes to stand. Some stooped posture  Gait: stable without device  Psychiatric: affect mostly full         Glucose  74 - 99 mg/dL 92   Comment: The American Diabetes Association (ADA) provides guidance for cutoff values for fasting glucose and random glucose. The ADA defines fasting as no caloric intake for at least 8 hours. Fasting plasma glucose results between 100 to 125 mg/dL indicate increased risk for diabetes (prediabetes).  Fasting plasma glucose results greater than or equal to 126 mg/dL meet the criteria for diagnosis of diabetes. In the absence of unequivocal hyperglycemia, results should be confirmed by repeat testing. In a patient with classic symptoms of  hyperglycemia or hyperglycemic crisis, random plasma glucose results greater than or equal to 200 mg/dL meet the criteria for diagnosis of diabetes.  Reference: Standards of Medical Care in Diabetes 2016, American Diabetes Association. Diabetes Care. 2016.39(Suppl 1).   BUN  9 - 24 mg/dL 34 High    Creatinine  0.73 - 1.22 mg/dL 1.85 High    Sodium  136 - 144 mmol/L 142   Potassium  3.7 - 5.1 mmol/L 4.0   Chloride  98 - 107 mmol/L 106   CO2  22 - 30 mmol/L 24   Anion Gap  8 - 15 mmol/L 12   Calcium, Total  8.5 - 10.2 mg/dL 8.6   Estimated Glomerular Filtration Rate  >=60 mL/min/1.73m² 35 Low      Ref Range & Units 3 mo ago   WBC  3.70 - 11.00 k/uL 6.63   Hemoglobin  13.0 - 17.0 g/dL 10.7 Low    Hematocrit  39.0 - 51.0 % 31.6 Low    MCV  80.0 - 100.0 fL 92.7   RDW-CV  11.5 - 15.0 % 12.1   Platelet Count  150 - 400 k/uL 168   Tests/Procedures:       CT head wo IV contrast 08/17/2024  CT maxillofacial bones wo IV contrast 08/17/2024  CT cervical spine wo IV contrast 08/17/2024  Impression  CT HEAD:  1. No evidence of hemorrhage, skull fracture, or other acute  intracranial trauma/abnormality.  2. Patchy attenuation changes are present in the periventricular and  subcortical white matter of bilateral cerebral hemispheres,  nonspecific findings favored to represent sequela of microvascular  disease.  CT FACIAL BONES:  1. Soft tissue swelling overlies the chin, likely posttraumatic  without absence of the underlying osseous injury. No facial or  orbital bone fracture is present.  2. Large periapical lucency surrounds the roots of the 2nd right  mandibular molar, correlate with dental exam.  CT C-SPINE:  1. No evidence of acute trauma to the cervical spine.  2. Multilevel degenerative changes of the cervical spine are present,  with intervertebral disc height loss, facet osteoarthropathy, and  mild spinal canal and neural foraminal stenosis at several levels due  to disc osteophyte complexes, ligamentum flavum thickening  and  hypertrophic facet changes.    Assessment/Plan   Urinary retention  BPH  - previously was straight catheterizing himself with assistance 4 times daily. But had recurrent hematuria due to significant BPH. Now has clark. [facility] with Dr. Monte at  [facility]. Suprapubic is being planned. Will continue on finasteride to control size of prostate.      3. epistaxis  4. Cough  Had multiple episodes of epistaxis will at [facility] in the fall. Also had one recently requiring use of clamps. Main concern now is ongoing cough, mainly during the night and in morning. Wakes up feeling like something in his throat. Advised starting flonase at night.     5. Anemia  - hemoglobin decreased from 12 to 9.2 during hospitalization in early september. It did improve and was around 11.0 in 11/2024 at [facility]. Will repeat cbc with next blood draw     6. CKD  7. HTN  - cr was up to 2.06 in hospital in early sept. Improved to 1.57 on 9/12. Baseline is around 1.5-1.7. Cr was 1.84 on admission to hospital 9/25 and was 1.76 on discharge 9/28. And was primarily 1.8-2.0 during his stay at [facility] into mid November. Will repeat renal panel with next blood draw. Bp overall stable with lisinopril 20mg daily. Will continue     8. Right knee hematoma and wound, Status post incision and drainage x 2  - fell at home down 1 step on 8/17. Found to have R knee hematoma causing lateralization of patella. Underwent I&D by ortho on 8/19   - had an area of bleeding medial to the drainage incisions. it blistered and skin became necrotic. Was readmitted on 9/6 and had I and D on 9/7. The necrotic skin was removed and had wound vac  - wound is completely healed     9. Dementia- moderate CDR  2.0  - gets help from  with most iadls. Needs some cueing, prompting for bathing and grooming. Needs assistance with clark bag  - neuropsych testing in 10/2023 noted that cognitive profile and history most consistent with Alzheimer's disease likely mixed with vascular  cognitive impairment. MRI in 8/2023 showed hippocampal volume at 4%. Previously followed with both geriatrics and neurology at [facility].  Had been on donepezil 10mg daily and memantine 10mg BID. Partner did not notice any improvement on these meds. We weaned off of donepezil due to weight loss and also possibly pre-syncope.  And memantine was decreased to 10mg HS. Of note, pt was previously on quetiapine at bedtime but caused increased agitation and confusion. Sleep improved in 6/2024 when quetiapine was switched to mirtazapine. Overall cognition stable per pt and . Will continue memantine 10mg HS     10. Weight loss  - down 12 lbs between 1/2024 and 6/2024. Had been on donepezil, which may have contributed to the weight loss at least in part. Was started on mirtazapine 7.5mg HS in 6/2024 to help with appetite and sleep. Now on 15mg daily.  And donepezil stopped on 9/30/24. Weight up 12 lbs since sept. Will monitor     11. HL   - is on pravastatin 10mg 3x per week. If he is on this for primary prevention, could consider stopping this in the future     12. GERD  - has been getting omeprazole 10mg PRN at [facility]. Not have GERD. Continue on this Prn      13. Depression   - on escitalopram 10mg daily. Also on mirtazapine 15mg HS. Mood has been good and stable. Will monitor. Can continue decreasing escitalopram to 5mg in near future     14. Vitamin D deficiency  - D level was 39.6 in 6/2024. Is on D3 2000 units daily. Will continue     15.  Low back pain  - was having this while at [facility]. Started tylenol 1000mg TID scheduled. Now on this prn. Did not mention back pain today.      16. HEALTH CARE MAINTENANCE  - up to date on immunizations per .   - had rsv. Had flu and covid booster in fall     Goals of care counseling discussion   Wishes to be DNR-CC-A. [place] form completed upon admission to [facility]. Will scan into the  system as well   Will address health maintenance issues at next visit       Tami Marroquin,  MD

## 2025-01-28 NOTE — PROGRESS NOTES
Gal Cramer is a 84 y.o. male on day 0 of admission presenting with Fall (on) (from) other stairs and steps, initial encounter.      Subjective   - NAEON. Patient s/p 3 falls within the past month, notably when changing levels on the stairs  - R knee hematoma with hemorrhagic blister which has popped on anteromedial knee. Bruising that extends proximally and distally to the blister    Labs:    Studies:  XR Lumbar spine (8/18):   1. No acute fracture or traumatic subluxation of the lumbar spine.  2. Mild superior endplate deformities from T12-L2 vertebral bodies without bony retropulsion.  3. Multilevel moderate degenerative changes with severe degenerative change at L4-L5.    CT R Knee (8/18):  No acute osseous abnormalities. Soft tissue mass in anterior subcutaneous fat consistent with hematoma    CT Head/Facial Bones/C-Spine (8/18):  CT HEAD:  1. No evidence of hemorrhage, skull fracture, or other acute intracranial trauma/abnormality.  2. Patchy attenuation changes are present in the periventricular and subcortical white matter of bilateral cerebral hemispheres, nonspecific findings favored to represent sequela of microvascular disease.  CT FACIAL BONES:  1. Soft tissue swelling overlies the chin, likely posttraumatic without absence of the underlying osseous injury. No facial or orbital bone fracture is present.  2. Large periapical lucency surrounds the roots of the 2nd right  mandibular molar, correlate with dental exam.    CT C-SPINE:  1. No evidence of acute trauma to the cervical spine.  2. Multilevel degenerative changes of the cervical spine are present, with intervertebral disc height loss, facet osteoarthropathy, and mild spinal canal and neural foraminal stenosis at several levels due to disc osteophyte complexes, ligamentum flavum thickening and hypertrophic facet changes.    XR hip, pelvis, femur, tibia/fibula, knee all unremarkable except for soft tissue swelling of the R anterior knee    [FreeTextEntry1] : this is a 61 year old with strong family hx of Carotid disease ( bilateral), mother passed of cerebral aneurysm. She is here today to establish care  # BANG with climbing stairs.  #chest pain  # Hyperlipidemia  7/18/24 , , HDL 73 TG 89 # family hx of Carotid stenosis (71) # family hx of cerebral aneurysm ( 67)   plan repeat labs / lipids/ lipoa/ cmp/ a1c check CTA neck and head for hx of carotid stenosis and r/o cerebral aneurysm  check CCTA with metoprolol 12 hour x 1 day and morning of.  rtc6-8  months after testing done      Objective     Last Recorded Vitals  /72   Pulse 99   Temp 36.9 °C (98.4 °F) (Temporal)   Resp 16   Wt 81.6 kg (180 lb)   SpO2 99%   Intake/Output last 3 Shifts:    Intake/Output Summary (Last 24 hours) at 8/18/2024 1159  Last data filed at 8/17/2024 2045  Gross per 24 hour   Intake --   Output 700 ml   Net -700 ml       Admission Weight  Weight: 81.6 kg (180 lb) (08/17/24 1729)    Daily Weight  08/17/24 : 81.6 kg (180 lb)    Image Results  XR lumbar spine 2-3 views  Narrative: STUDY:  XR LUMBAR SPINE 2-3 VIEWS; ;  8/18/2024 10:22 am      INDICATION:  Signs/Symptoms:left low back pain after fall.      COMPARISON:  Abdominal radiograph 03/13/2012      ACCESSION NUMBER(S):  [id number]      ORDERING CLINICIAN:  SAVANNAH CARLIN      FINDINGS:  Three views of the lumbar spine.      Mild dextrocurvature of the lumbar spine.      No traumatic subluxation. No acute fractures.      Mild superior endplate deformities from T12-L2 vertebral bodies. No  bony retropulsion into the spinal canal.      Moderate multilevel degenerative change disc height loss and endplate  osteophytosis. Severe degenerative change of the L4-L5. Mild facet  arthropathy at L5-S1.      Impression: 1. No acute fracture or traumatic subluxation of the lumbar spine.  2. Mild superior endplate deformities from T12-L2 vertebral bodies  without bony retropulsion.  3. Multilevel moderate degenerative changes with severe degenerative  change at L4-L5.          I personally reviewed the images/study and I agree with Dr. John Paul Dorman findings as stated. This study was interpreted at [place], [place]      MACRO:  None          Dictation workstation:   [id number]      Physical Exam  Constitutional:       General: He is not in acute distress.     Appearance: Normal appearance. He is normal weight.   HENT:      Head: Normocephalic and atraumatic.      Right Ear: External ear normal.      Left Ear:  External ear normal.      Nose: Nose normal.      Mouth/Throat:      Mouth: Mucous membranes are moist.   Eyes:      General: No scleral icterus.     Extraocular Movements: Extraocular movements intact.      Conjunctiva/sclera: Conjunctivae normal.      Pupils: Pupils are equal, round, and reactive to light.   Cardiovascular:      Rate and Rhythm: Normal rate and regular rhythm.      Pulses: Normal pulses.      Heart sounds: Normal heart sounds. No murmur heard.     No friction rub. No gallop.   Pulmonary:      Effort: Pulmonary effort is normal. No respiratory distress.      Breath sounds: Normal breath sounds. No wheezing.   Abdominal:      General: Abdomen is flat. Bowel sounds are normal. There is no distension.      Palpations: Abdomen is soft. There is no mass.      Tenderness: There is no abdominal tenderness.   Musculoskeletal:         General: Swelling, tenderness (mild) and signs of injury present. No deformity. Normal range of motion.      Cervical back: Normal range of motion.      Right lower leg: No edema.      Left lower leg: No edema.      Comments: RLE edema. R. Knee with large medial hematoma with hemorrhagic blister on admission s.p rupture. Additional small skin scraping on R knee below injury. Surrounding bruising both proximally and distally. R leg cooler than left, pulses palpable distally and neurological exam unremarkable.   Skin:     General: Skin is warm and dry.      Capillary Refill: Capillary refill takes less than 2 seconds.   Neurological:      General: No focal deficit present.      Mental Status: He is alert and oriented to person, place, and time. Mental status is at baseline.      Sensory: No sensory deficit.      Gait: Gait abnormal.      Comments: Some unsteadiness on normal gait. Unable to complete tandem walking 2/2 balance issues. Romberg negative.   Psychiatric:         Mood and Affect: Mood normal.         Behavior: Behavior normal.         Thought Content: Thought content  normal.         Medications  Scheduled medications  [Held by provider] donepezil, 10 mg, oral, Daily  escitalopram, 10 mg, oral, Daily  finasteride, 5 mg, oral, Daily  lidocaine, 1 patch, transdermal, Daily  lisinopril, 20 mg, oral, Daily  memantine, 10 mg, oral, Daily  mirtazapine, 15 mg, oral, Nightly  [START ON 8/19/2024] pravastatin, 10 mg, oral, Every Mon/Wed/Fri  sennosides, 2 tablet, oral, BID      Continuous medications     PRN medications  PRN medications: acetaminophen **OR** acetaminophen **OR** acetaminophen, oxyCODONE    Relevant Results  Results for orders placed or performed during the hospital encounter of 08/17/24 (from the past 24 hour(s))   ECG 12 lead   Result Value Ref Range    Ventricular Rate 76 BPM    Atrial Rate 76 BPM    MD Interval 168 ms    QRS Duration 124 ms    QT Interval 418 ms    QTC Calculation(Bazett) 470 ms    P Axis 77 degrees    R Axis 77 degrees    T Axis 65 degrees    QRS Count 13 beats    Q Onset 226 ms    P Onset 142 ms    P Offset 197 ms    T Offset 435 ms    QTC Fredericia 452 ms   CBC and Auto Differential   Result Value Ref Range    WBC 8.5 4.4 - 11.3 x10*3/uL    nRBC 0.0 0.0 - 0.0 /100 WBCs    RBC 3.52 (L) 4.50 - 5.90 x10*6/uL    Hemoglobin 12.3 (L) 13.5 - 17.5 g/dL    Hematocrit 32.2 (L) 41.0 - 52.0 %    MCV 92 80 - 100 fL    MCH 34.9 (H) 26.0 - 34.0 pg    MCHC 38.2 (H) 32.0 - 36.0 g/dL    RDW 12.3 11.5 - 14.5 %    Platelets 183 150 - 450 x10*3/uL    Neutrophils % 78.0 40.0 - 80.0 %    Immature Granulocytes %, Automated 0.4 0.0 - 0.9 %    Lymphocytes % 13.4 13.0 - 44.0 %    Monocytes % 6.9 2.0 - 10.0 %    Eosinophils % 0.7 0.0 - 6.0 %    Basophils % 0.6 0.0 - 2.0 %    Neutrophils Absolute 6.65 (H) 1.60 - 5.50 x10*3/uL    Immature Granulocytes Absolute, Automated 0.03 0.00 - 0.50 x10*3/uL    Lymphocytes Absolute 1.14 0.80 - 3.00 x10*3/uL    Monocytes Absolute 0.59 0.05 - 0.80 x10*3/uL    Eosinophils Absolute 0.06 0.00 - 0.40 x10*3/uL    Basophils Absolute 0.05 0.00 -  0.10 x10*3/uL   Basic metabolic panel   Result Value Ref Range    Glucose 110 (H) 74 - 99 mg/dL    Sodium 139 136 - 145 mmol/L    Potassium 3.9 3.5 - 5.3 mmol/L    Chloride 103 98 - 107 mmol/L    Bicarbonate 20 (L) 21 - 32 mmol/L    Anion Gap 20 10 - 20 mmol/L    Urea Nitrogen 26 (H) 6 - 23 mg/dL    Creatinine 1.73 (H) 0.50 - 1.30 mg/dL    eGFR 38 (L) >60 mL/min/1.73m*2    Calcium 9.8 8.6 - 10.6 mg/dL   Type And Screen   Result Value Ref Range    ABO TYPE A     Rh TYPE NEG     ANTIBODY SCREEN NEG    Protime-INR   Result Value Ref Range    Protime 11.3 9.8 - 12.8 seconds    INR 1.0 0.9 - 1.1   APTT   Result Value Ref Range    aPTT 25 (L) 27 - 38 seconds   Troponin I, High Sensitivity   Result Value Ref Range    Troponin I, High Sensitivity (CMC) 7 0 - 53 ng/L   ECG 12 lead   Result Value Ref Range    Ventricular Rate 71 BPM    Atrial Rate 72 BPM    RI Interval 190 ms    QRS Duration 130 ms    QT Interval 464 ms    QTC Calculation(Bazett) 504 ms    P Axis 64 degrees    R Axis 66 degrees    T Axis 59 degrees    QRS Count 12 beats    Q Onset 203 ms    P Onset 108 ms    P Offset 166 ms    T Offset 435 ms    QTC Fredericia 491 ms   Renal Function Panel   Result Value Ref Range    Glucose 115 (H) 74 - 99 mg/dL    Sodium 141 136 - 145 mmol/L    Potassium 4.5 3.5 - 5.3 mmol/L    Chloride 105 98 - 107 mmol/L    Bicarbonate 20 (L) 21 - 32 mmol/L    Anion Gap 21 (H) 10 - 20 mmol/L    Urea Nitrogen 29 (H) 6 - 23 mg/dL    Creatinine 2.06 (H) 0.50 - 1.30 mg/dL    eGFR 31 (L) >60 mL/min/1.73m*2    Calcium 9.6 8.6 - 10.6 mg/dL    Phosphorus 3.8 2.5 - 4.9 mg/dL    Albumin 4.2 3.4 - 5.0 g/dL   Magnesium   Result Value Ref Range    Magnesium 1.78 1.60 - 2.40 mg/dL   CBC and Auto Differential   Result Value Ref Range    WBC 8.3 4.4 - 11.3 x10*3/uL    nRBC 0.0 0.0 - 0.0 /100 WBCs    RBC 3.35 (L) 4.50 - 5.90 x10*6/uL    Hemoglobin 11.8 (L) 13.5 - 17.5 g/dL    Hematocrit 30.0 (L) 41.0 - 52.0 %    MCV 90 80 - 100 fL    MCH 35.2 (H) 26.0 -  34.0 pg    MCHC 39.3 (H) 32.0 - 36.0 g/dL    RDW 12.0 11.5 - 14.5 %    Platelets 150 150 - 450 x10*3/uL    Neutrophils % 81.6 40.0 - 80.0 %    Immature Granulocytes %, Automated 0.2 0.0 - 0.9 %    Lymphocytes % 9.3 13.0 - 44.0 %    Monocytes % 8.2 2.0 - 10.0 %    Eosinophils % 0.2 0.0 - 6.0 %    Basophils % 0.5 0.0 - 2.0 %    Neutrophils Absolute 6.72 (H) 1.60 - 5.50 x10*3/uL    Immature Granulocytes Absolute, Automated 0.02 0.00 - 0.50 x10*3/uL    Lymphocytes Absolute 0.77 (L) 0.80 - 3.00 x10*3/uL    Monocytes Absolute 0.68 0.05 - 0.80 x10*3/uL    Eosinophils Absolute 0.02 0.00 - 0.40 x10*3/uL    Basophils Absolute 0.04 0.00 - 0.10 x10*3/uL     XR lumbar spine 2-3 views    Result Date: 8/18/2024  STUDY: XR LUMBAR SPINE 2-3 VIEWS; ;  8/18/2024 10:22 am   INDICATION: Signs/Symptoms:left low back pain after fall.   COMPARISON: Abdominal radiograph 03/13/2012   ACCESSION NUMBER(S): [id number]   ORDERING CLINICIAN: SAVANNAH CARLIN   FINDINGS: Three views of the lumbar spine.   Mild dextrocurvature of the lumbar spine.   No traumatic subluxation. No acute fractures.   Mild superior endplate deformities from T12-L2 vertebral bodies. No bony retropulsion into the spinal canal.   Moderate multilevel degenerative change disc height loss and endplate osteophytosis. Severe degenerative change of the L4-L5. Mild facet arthropathy at L5-S1.       1. No acute fracture or traumatic subluxation of the lumbar spine. 2. Mild superior endplate deformities from T12-L2 vertebral bodies without bony retropulsion. 3. Multilevel moderate degenerative changes with severe degenerative change at L4-L5.     I personally reviewed the images/study and I agree with Dr. John Paul Dorman findings as stated. This study was interpreted at [place], [place]   MACRO: None     Dictation workstation:   [id number]    ECG 12 lead    Result Date: 8/17/2024  Normal sinus rhythm Right bundle branch block Abnormal  ECG When compared with ECG of 13-MAR-2012 08:37, Right bundle branch block is now Present See ED provider note for full interpretation and clinical correlation Confirmed by David Marmolejo ([id number]) on 8/17/2024 10:52:07 PM    ECG 12 lead    Result Date: 8/17/2024  Normal sinus rhythm Nonspecific intraventricular block Abnormal ECG When compared with ECG of 17-AUG-2024 17:31, Nonspecific intraventricular block has replaced Right bundle branch block See ED provider note for full interpretation and clinical correlation Confirmed by David Marmolejo ([id number]) on 8/17/2024 10:52:02 PM    CT knee right wo IV contrast    Result Date: 8/17/2024  STUDY: CT Right Knee; Completed Time:  8/17/2024 at 7:09 PM INDICATION: Right knee pain and swelling; minimal XR findings. COMPARISON: XR right knee, XR right femur, XR right hip and XR right tibia and fibula 8/17/2024. ACCESSION NUMBER(S): [id number] ORDERING CLINICIAN: SAVANNAH CARLIN TECHNIQUE:  Thin section axial images were obtained through the right knee without intravenous contrast.  Orthogonal reconstructed images were obtained and reviewed.  Automated mA/kV exposure control was utilized and patient examination was performed in strict accordance with principles of ALARA. FINDINGS: There is a 3.6 x 8.9 x 11.2 cm soft tissue mass anteriorly in the subcutaneous fat.  It is heterogeneous in attenuation and is consistent with a hematoma.    No acute osseous abnormalities. Soft tissue mass in the anterior subcutaneous fat consistent with a hematoma. Signed by Jose Alejandro Cross MD    CT head wo IV contrast    Result Date: 8/17/2024  Interpreted By:  Miriam Paz, STUDY: CT HEAD WO IV CONTRAST; CT CERVICAL SPINE WO IV CONTRAST; CT FACIAL BONES WO IV CONTRAST;  8/17/2024 7:07 pm   INDICATION: Signs/Symptoms:fall struck head, no AC; Signs/Symptoms:fall; Signs/Symptoms:fall, right chibn pain.   COMPARISON: None.   ACCESSION NUMBER(S): [id number]; [id number]; [id number]    ORDERING CLINICIAN: SAVANNAH CARLIN   TECHNIQUE: Noncontrast axial CT scan of head was performed, with coronal and sagittal reformats provided. The images were reviewed in bone, brain, blood and soft tissue windows.   Thin cut axial CT images through the facial bones were obtained and reconstructed in the coronal and sagittal plane. 3D reconstruction of the facial bones was created on a dedicated workstation and provided for interpretation.   Axial CT images of the cervical spine are obtained. Axial, coronal and sagittal reconstructions are provided for review.   FINDINGS: CT HEAD:   No hyperdense intracranial hemorrhage is identified. There is no mass effect or midline shift.   Gray-white differentiation is intact, without evidence of CT apparent transcortical infarct, although patchy attenuation changes are present in the periventricular and subcortical white matter of bilateral cerebral hemispheres, nonspecific findings favored to represent sequela of microvascular disease.   No abnormal ventricular dilatation is present. Basal cisterns are patent. No extra-axial fluid collections are identified.   Scalp soft tissues do not demonstrate any acute abnormality. Calvarium is unremarkable without evidence of skull fracture.   Mastoid air cells and middle ear cavities are clear.   CT FACIAL BONES:   Bony orbits are intact, without evidence of fracture. Periorbital soft tissues and intraorbital structures are symmetric in appearance without evidence of acute trauma. Patient is status post cataract extraction bilaterally.   No acute facial bone fracture is identified. Nasal bones are unremarkable in appearance.   Paranasal sinuses are well aerated without evidence of air-fluid levels.   Some soft tissue swelling overlies the chin, without evidence of underlying osseous injury. No associated fluid collection or soft tissue gas is present.   Although evaluation of the oral cavity is degraded by beam hardening artifact no  definite evidence of acute trauma to the oral cavity is present. Temporomandibular joints are intact, with asymmetric degenerative changes present in the right.   Large periapical lucency surrounds the roots of the 2nd right maxillary molar.   Parotid and submandibular glands are symmetric in appearance and otherwise unremarkable.   CT C-SPINE:   There is straightening of normal lordotic curvature of the cervical spine, without evidence of significant spondylolisthesis.   Cervical vertebral body heights are preserved without evidence of compression fractures, although some insufficiency endplate changes with smaller Schmorl's nodes are present at several levels. There is no evidence of acute trauma to the posterior elements of the cervical spine.   Craniocervical junction is intact, although degenerative changes at the atlantoaxial articulation with hypertrophic pannus extending into the anterior epidural space of C1 contributing to mild spinal canal narrowing at this level with the effacement of the anterior subarachnoid space.   Facet joints are preserved without evidence of traumatic subluxation or perching, although multilevel degenerate facet osteoarthropathy is evident, most pronounced at C2-C3 and C3-C4 on the left.   Multilevel intervertebral disc height loss is present, moderate to severe at C4-C5 and C5-C6 and mild at other levels.   No high-grade stenosis is identified in the cervical spine, although mild spinal canal narrowing is suspected at C3-C4, C4-C5 and C5-C6 due to disc osteophyte complexes and ligamentum flavum thickening.   Mild spinal canal narrowing is present at C3-C4 bilaterally and C4-C5 on the left due to uncovertebral and facet joint hypertrophy.   Prevertebral and paraspinal soft tissues do not demonstrate any acute abnormalities. Included lung apices are clear.       CT HEAD: 1. No evidence of hemorrhage, skull fracture, or other acute intracranial trauma/abnormality. 2. Patchy  attenuation changes are present in the periventricular and subcortical white matter of bilateral cerebral hemispheres, nonspecific findings favored to represent sequela of microvascular disease.   CT FACIAL BONES: 1. Soft tissue swelling overlies the chin, likely posttraumatic without absence of the underlying osseous injury. No facial or orbital bone fracture is present. 2. Large periapical lucency surrounds the roots of the 2nd right mandibular molar, correlate with dental exam.   CT C-SPINE: 1. No evidence of acute trauma to the cervical spine. 2. Multilevel degenerative changes of the cervical spine are present, with intervertebral disc height loss, facet osteoarthropathy, and mild spinal canal and neural foraminal stenosis at several levels due to disc osteophyte complexes, ligamentum flavum thickening and hypertrophic facet changes.   MACRO: None   Signed by: Miriam Paz 8/17/2024 7:28 PM Dictation workstation:   [id number]    CT maxillofacial bones wo IV contrast    Result Date: 8/17/2024  Interpreted By:  Miriam Paz, STUDY: CT HEAD WO IV CONTRAST; CT CERVICAL SPINE WO IV CONTRAST; CT FACIAL BONES WO IV CONTRAST;  8/17/2024 7:07 pm   INDICATION: Signs/Symptoms:fall struck head, no AC; Signs/Symptoms:fall; Signs/Symptoms:fall, right chibn pain.   COMPARISON: None.   ACCESSION NUMBER(S): [id number]; [id number]; [id number]   ORDERING CLINICIAN: SAVANNAH CARLIN   TECHNIQUE: Noncontrast axial CT scan of head was performed, with coronal and sagittal reformats provided. The images were reviewed in bone, brain, blood and soft tissue windows.   Thin cut axial CT images through the facial bones were obtained and reconstructed in the coronal and sagittal plane. 3D reconstruction of the facial bones was created on a dedicated workstation and provided for interpretation.   Axial CT images of the cervical spine are obtained. Axial, coronal and sagittal reconstructions are provided for review.    FINDINGS: CT HEAD:   No hyperdense intracranial hemorrhage is identified. There is no mass effect or midline shift.   Gray-white differentiation is intact, without evidence of CT apparent transcortical infarct, although patchy attenuation changes are present in the periventricular and subcortical white matter of bilateral cerebral hemispheres, nonspecific findings favored to represent sequela of microvascular disease.   No abnormal ventricular dilatation is present. Basal cisterns are patent. No extra-axial fluid collections are identified.   Scalp soft tissues do not demonstrate any acute abnormality. Calvarium is unremarkable without evidence of skull fracture.   Mastoid air cells and middle ear cavities are clear.   CT FACIAL BONES:   Bony orbits are intact, without evidence of fracture. Periorbital soft tissues and intraorbital structures are symmetric in appearance without evidence of acute trauma. Patient is status post cataract extraction bilaterally.   No acute facial bone fracture is identified. Nasal bones are unremarkable in appearance.   Paranasal sinuses are well aerated without evidence of air-fluid levels.   Some soft tissue swelling overlies the chin, without evidence of underlying osseous injury. No associated fluid collection or soft tissue gas is present.   Although evaluation of the oral cavity is degraded by beam hardening artifact no definite evidence of acute trauma to the oral cavity is present. Temporomandibular joints are intact, with asymmetric degenerative changes present in the right.   Large periapical lucency surrounds the roots of the 2nd right maxillary molar.   Parotid and submandibular glands are symmetric in appearance and otherwise unremarkable.   CT C-SPINE:   There is straightening of normal lordotic curvature of the cervical spine, without evidence of significant spondylolisthesis.   Cervical vertebral body heights are preserved without evidence of compression fractures,  although some insufficiency endplate changes with smaller Schmorl's nodes are present at several levels. There is no evidence of acute trauma to the posterior elements of the cervical spine.   Craniocervical junction is intact, although degenerative changes at the atlantoaxial articulation with hypertrophic pannus extending into the anterior epidural space of C1 contributing to mild spinal canal narrowing at this level with the effacement of the anterior subarachnoid space.   Facet joints are preserved without evidence of traumatic subluxation or perching, although multilevel degenerate facet osteoarthropathy is evident, most pronounced at C2-C3 and C3-C4 on the left.   Multilevel intervertebral disc height loss is present, moderate to severe at C4-C5 and C5-C6 and mild at other levels.   No high-grade stenosis is identified in the cervical spine, although mild spinal canal narrowing is suspected at C3-C4, C4-C5 and C5-C6 due to disc osteophyte complexes and ligamentum flavum thickening.   Mild spinal canal narrowing is present at C3-C4 bilaterally and C4-C5 on the left due to uncovertebral and facet joint hypertrophy.   Prevertebral and paraspinal soft tissues do not demonstrate any acute abnormalities. Included lung apices are clear.       CT HEAD: 1. No evidence of hemorrhage, skull fracture, or other acute intracranial trauma/abnormality. 2. Patchy attenuation changes are present in the periventricular and subcortical white matter of bilateral cerebral hemispheres, nonspecific findings favored to represent sequela of microvascular disease.   CT FACIAL BONES: 1. Soft tissue swelling overlies the chin, likely posttraumatic without absence of the underlying osseous injury. No facial or orbital bone fracture is present. 2. Large periapical lucency surrounds the roots of the 2nd right mandibular molar, correlate with dental exam.   CT C-SPINE: 1. No evidence of acute trauma to the cervical spine. 2. Multilevel  degenerative changes of the cervical spine are present, with intervertebral disc height loss, facet osteoarthropathy, and mild spinal canal and neural foraminal stenosis at several levels due to disc osteophyte complexes, ligamentum flavum thickening and hypertrophic facet changes.   MACRO: None   Signed by: Miriam Paz 8/17/2024 7:28 PM Dictation workstation:   [id number]    CT cervical spine wo IV contrast    Result Date: 8/17/2024  Interpreted By:  Miriam Paz, STUDY: CT HEAD WO IV CONTRAST; CT CERVICAL SPINE WO IV CONTRAST; CT FACIAL BONES WO IV CONTRAST;  8/17/2024 7:07 pm   INDICATION: Signs/Symptoms:fall struck head, no AC; Signs/Symptoms:fall; Signs/Symptoms:fall, right chibn pain.   COMPARISON: None.   ACCESSION NUMBER(S): [id number]; [id number]; [id number]   ORDERING CLINICIAN: SAVANNAH CARLIN   TECHNIQUE: Noncontrast axial CT scan of head was performed, with coronal and sagittal reformats provided. The images were reviewed in bone, brain, blood and soft tissue windows.   Thin cut axial CT images through the facial bones were obtained and reconstructed in the coronal and sagittal plane. 3D reconstruction of the facial bones was created on a dedicated workstation and provided for interpretation.   Axial CT images of the cervical spine are obtained. Axial, coronal and sagittal reconstructions are provided for review.   FINDINGS: CT HEAD:   No hyperdense intracranial hemorrhage is identified. There is no mass effect or midline shift.   Gray-white differentiation is intact, without evidence of CT apparent transcortical infarct, although patchy attenuation changes are present in the periventricular and subcortical white matter of bilateral cerebral hemispheres, nonspecific findings favored to represent sequela of microvascular disease.   No abnormal ventricular dilatation is present. Basal cisterns are patent. No extra-axial fluid collections are identified.   Scalp soft tissues do  not demonstrate any acute abnormality. Calvarium is unremarkable without evidence of skull fracture.   Mastoid air cells and middle ear cavities are clear.   CT FACIAL BONES:   Bony orbits are intact, without evidence of fracture. Periorbital soft tissues and intraorbital structures are symmetric in appearance without evidence of acute trauma. Patient is status post cataract extraction bilaterally.   No acute facial bone fracture is identified. Nasal bones are unremarkable in appearance.   Paranasal sinuses are well aerated without evidence of air-fluid levels.   Some soft tissue swelling overlies the chin, without evidence of underlying osseous injury. No associated fluid collection or soft tissue gas is present.   Although evaluation of the oral cavity is degraded by beam hardening artifact no definite evidence of acute trauma to the oral cavity is present. Temporomandibular joints are intact, with asymmetric degenerative changes present in the right.   Large periapical lucency surrounds the roots of the 2nd right maxillary molar.   Parotid and submandibular glands are symmetric in appearance and otherwise unremarkable.   CT C-SPINE:   There is straightening of normal lordotic curvature of the cervical spine, without evidence of significant spondylolisthesis.   Cervical vertebral body heights are preserved without evidence of compression fractures, although some insufficiency endplate changes with smaller Schmorl's nodes are present at several levels. There is no evidence of acute trauma to the posterior elements of the cervical spine.   Craniocervical junction is intact, although degenerative changes at the atlantoaxial articulation with hypertrophic pannus extending into the anterior epidural space of C1 contributing to mild spinal canal narrowing at this level with the effacement of the anterior subarachnoid space.   Facet joints are preserved without evidence of traumatic subluxation or perching, although  multilevel degenerate facet osteoarthropathy is evident, most pronounced at C2-C3 and C3-C4 on the left.   Multilevel intervertebral disc height loss is present, moderate to severe at C4-C5 and C5-C6 and mild at other levels.   No high-grade stenosis is identified in the cervical spine, although mild spinal canal narrowing is suspected at C3-C4, C4-C5 and C5-C6 due to disc osteophyte complexes and ligamentum flavum thickening.   Mild spinal canal narrowing is present at C3-C4 bilaterally and C4-C5 on the left due to uncovertebral and facet joint hypertrophy.   Prevertebral and paraspinal soft tissues do not demonstrate any acute abnormalities. Included lung apices are clear.       CT HEAD: 1. No evidence of hemorrhage, skull fracture, or other acute intracranial trauma/abnormality. 2. Patchy attenuation changes are present in the periventricular and subcortical white matter of bilateral cerebral hemispheres, nonspecific findings favored to represent sequela of microvascular disease.   CT FACIAL BONES: 1. Soft tissue swelling overlies the chin, likely posttraumatic without absence of the underlying osseous injury. No facial or orbital bone fracture is present. 2. Large periapical lucency surrounds the roots of the 2nd right mandibular molar, correlate with dental exam.   CT C-SPINE: 1. No evidence of acute trauma to the cervical spine. 2. Multilevel degenerative changes of the cervical spine are present, with intervertebral disc height loss, facet osteoarthropathy, and mild spinal canal and neural foraminal stenosis at several levels due to disc osteophyte complexes, ligamentum flavum thickening and hypertrophic facet changes.   MACRO: None   Signed by: Miriam Paz 8/17/2024 7:28 PM Dictation workstation:   [id number]    XR hip right with pelvis when performed 2 or 3 views    Result Date: 8/17/2024  STUDY: Femur Radiographs, Pelvis and Right Hip Radiographs;  08/17/2024 6:18 PM INDICATION: Fall/trauma.  COMPARISON: None available. ACCESSION NUMBER(S): [id number], [id number] ORDERING CLINICIAN: SAVANNAH CARLIN TECHNIQUE:  Two views (five images) of the right femur.  One view(s) of the pelvis.  Two view(s) of the right hip hip. FINDINGS:  Right Femur: There is no displaced fracture.  The alignment is anatomic.  There is marked soft tissue swelling anteriorly over the knee. PELVIS: The pelvic ring is intact.  There is no acute fracture.  There are degenerative changes in the included the lower lumbar spine.  The sacroiliac joints are patent.  There is mild narrowing of the left hip joint Right Hip: There is no displaced fracture.  The alignment is anatomic.  There is mild joint space narrowing with acetabular spurring.  Nonspecific soft tissue reticulations.    No acute bony abnormalities on x-ray examination of the pelvis, right femur and right hip. Marked anterior soft tissue swelling over the right knee. Signed by Vonda Sanchez DO    XR femur right 2+ views    Result Date: 8/17/2024  STUDY: Femur Radiographs, Pelvis and Right Hip Radiographs;  08/17/2024 6:18 PM INDICATION: Fall/trauma. COMPARISON: None available. ACCESSION NUMBER(S): [id number], [id number] ORDERING CLINICIAN: SAVANNAH CARLIN TECHNIQUE:  Two views (five images) of the right femur.  One view(s) of the pelvis.  Two view(s) of the right hip hip. FINDINGS:  Right Femur: There is no displaced fracture.  The alignment is anatomic.  There is marked soft tissue swelling anteriorly over the knee. PELVIS: The pelvic ring is intact.  There is no acute fracture.  There are degenerative changes in the included the lower lumbar spine.  The sacroiliac joints are patent.  There is mild narrowing of the left hip joint Right Hip: There is no displaced fracture.  The alignment is anatomic.  There is mild joint space narrowing with acetabular spurring.  Nonspecific soft tissue reticulations.    No acute bony abnormalities on x-ray examination of the pelvis,  right femur and right hip. Marked anterior soft tissue swelling over the right knee. Signed by Vonda Sanchez DO    XR tibia fibula right 2 views    Result Date: 8/17/2024  STUDY: Knee Radiographs; 08/17/2024 6:17 PM INDICATION: Fall with right knee and leg pain. COMPARISON: None. ACCESSION NUMBER(S): [id number], [id number] ORDERING CLINICIAN: SAVANNAH CARLIN TECHNIQUE:  Right Knee:  Three view(s) of the right knee. Right Tibia/Fibula:  Five view(s) of the right tibia and fibula. FINDINGS:  Right Knee:  There is no displaced fracture.  The alignment is anatomic.  No soft tissue abnormality is seen.  There is no joint effusion. There is prominent soft tissue swelling anterior to the patella. Right Tibia/Fibula:  There is no displaced fracture.  The alignment is anatomic.  No soft tissue abnormality is seen.    Prominent soft tissue swelling anterior to the patella. No fracture or dislocation. Signed by Andre Chanel MD    XR knee right 1-2 views    Result Date: 8/17/2024  STUDY: Knee Radiographs; 08/17/2024 6:17 PM INDICATION: Fall with right knee and leg pain. COMPARISON: None. ACCESSION NUMBER(S): [id number], [id number] ORDERING CLINICIAN: SAVANNAH CARLIN TECHNIQUE:  Right Knee:  Three view(s) of the right knee. Right Tibia/Fibula:  Five view(s) of the right tibia and fibula. FINDINGS:  Right Knee:  There is no displaced fracture.  The alignment is anatomic.  No soft tissue abnormality is seen.  There is no joint effusion. There is prominent soft tissue swelling anterior to the patella. Right Tibia/Fibula:  There is no displaced fracture.  The alignment is anatomic.  No soft tissue abnormality is seen.    Prominent soft tissue swelling anterior to the patella. No fracture or dislocation. Signed by Andre Chanel MD      Assessment/Plan    Gal Cramer is a 84 y.o. male with a hx of dementia, HTN, HLD, GERD, peripheral neuropathy, and BPH (self-cath 3-4x every day) admitted following unprovoked  mechanical fall resulting in R. Knee hematoma.     #Mechanical Fall  #R knee hematoma  - CT knee without fracture but with overlying hematoma causing lateral patellar deviation  - CT Head, maxillofacial bones and hip/femur xrays without acute abnormality  Plan:  PT/OT  Pain control with lidocaine patch, tylenol, oxy 5 prn  Trend CBC  Will julio c hematoma to assess for expansion  Orthopedic surgery consult for eval  DVT prophylaxis held given hematoma. Consider starting after repeat CBC to evaluate hematoma progression     #Dementia  - Poor memory and neurocognitive status at baseline. Patient somewhat confused on exam.  Plan:  Hold donepezil as borderline Qtc (504) in conjunction with lexapro  Continue mirtazapine, escitalapram, and memantine     #HTN  #HLD  Plan:  Continue home statin and lisinopril 20      #BPH  Plan:  Continue home finasteride   Pt to continue straight-cathing per home regimen (3-4x daily)     #VEDA on CKD  - bl Cr ~1.5, Cr 1.73on admit > 2.06 on repeat  - 1L fluid bolus on admission  Plan:  Continue to trend creatinine on repeat RFPs  Urine electrolytes given worsening creatinine after fluid bolus  Consider additional fluid bolus        #Dispo  -PT/OT ordered     F: PRN  E: PRN  N: Regular diet  GI: None indicated  DVT prophylaxis: SCDs.  Code status: Full (CONFIRMED ON ADMISSION)  Surrogate decision maker:  Max [phone]     COY BRISCOE, MS4

## 2025-01-28 NOTE — PATIENT INSTRUCTIONS
Get nasal stopper clips from [employer]     2. Use fluticasone nasal spray  - 2 sprays in each nostril at bedtime     3. Get a humidifier for the bedroom     4. For memory  - increase mentally stimulating activities (puzzles, word searches, sudoku, crosswords, solitaire with cards, coloring)  - continue socialization   - exercise together with max (walking in the hallways)    5. Dr brito is ok with the suprapubic therapy    6. Follow up visit in 6 month  - we will repeat memory test  - and do the medicare annual wellness  - and blood work

## 2025-01-31 DIAGNOSIS — I10 PRIMARY HYPERTENSION: ICD-10-CM

## 2025-01-31 DIAGNOSIS — N18.30 STAGE 3 CHRONIC KIDNEY DISEASE, UNSPECIFIED WHETHER STAGE 3A OR 3B CKD (MULTI): Primary | ICD-10-CM

## 2025-01-31 RX ORDER — LISINOPRIL 20 MG/1
20 TABLET ORAL DAILY
Qty: 90 TABLET | Refills: 3 | Status: SHIPPED | OUTPATIENT
Start: 2025-01-31 | End: 2026-01-31

## 2025-07-29 ENCOUNTER — CLINICAL SUPPORT (OUTPATIENT)
Dept: GERIATRIC MEDICINE | Facility: CLINIC | Age: 85
End: 2025-07-29
Payer: MEDICARE

## 2025-07-29 DIAGNOSIS — F03.B0: ICD-10-CM

## 2025-07-29 DIAGNOSIS — D64.9 ANEMIA, UNSPECIFIED TYPE: ICD-10-CM

## 2025-07-29 DIAGNOSIS — F32.5 MAJOR DEPRESSIVE DISORDER IN REMISSION, UNSPECIFIED WHETHER RECURRENT: ICD-10-CM

## 2025-07-29 DIAGNOSIS — R31.9 HEMATURIA, UNSPECIFIED TYPE: ICD-10-CM

## 2025-07-29 DIAGNOSIS — E55.9 VITAMIN D DEFICIENCY: ICD-10-CM

## 2025-07-29 DIAGNOSIS — I10 PRIMARY HYPERTENSION: ICD-10-CM

## 2025-07-29 DIAGNOSIS — R33.9 URINARY RETENTION: ICD-10-CM

## 2025-07-29 DIAGNOSIS — Z00.00 HEALTH CARE MAINTENANCE: ICD-10-CM

## 2025-07-29 DIAGNOSIS — N18.30 STAGE 3 CHRONIC KIDNEY DISEASE, UNSPECIFIED WHETHER STAGE 3A OR 3B CKD (MULTI): ICD-10-CM

## 2025-07-29 DIAGNOSIS — R04.0 EPISTAXIS: ICD-10-CM

## 2025-07-29 DIAGNOSIS — S81.001D OPEN WOUND OF RIGHT KNEE, SUBSEQUENT ENCOUNTER: ICD-10-CM

## 2025-07-29 DIAGNOSIS — Z87.19 HISTORY OF GASTROESOPHAGEAL REFLUX (GERD): ICD-10-CM

## 2025-07-29 DIAGNOSIS — R63.4 WEIGHT LOSS: ICD-10-CM

## 2025-08-26 ENCOUNTER — OFFICE VISIT (OUTPATIENT)
Dept: GERIATRIC MEDICINE | Facility: CLINIC | Age: 85
End: 2025-08-26
Payer: MEDICARE

## 2025-08-26 VITALS
SYSTOLIC BLOOD PRESSURE: 152 MMHG | TEMPERATURE: 97 F | BODY MASS INDEX: 27.51 KG/M2 | DIASTOLIC BLOOD PRESSURE: 85 MMHG | WEIGHT: 186.3 LBS

## 2025-08-26 DIAGNOSIS — R63.5 WEIGHT GAIN: ICD-10-CM

## 2025-08-26 DIAGNOSIS — R33.8 BENIGN PROSTATIC HYPERPLASIA WITH URINARY RETENTION: ICD-10-CM

## 2025-08-26 DIAGNOSIS — F03.B0: Primary | ICD-10-CM

## 2025-08-26 DIAGNOSIS — I10 PRIMARY HYPERTENSION: ICD-10-CM

## 2025-08-26 DIAGNOSIS — R33.9 URINARY RETENTION: ICD-10-CM

## 2025-08-26 DIAGNOSIS — S42.225D CLOSED 2-PART NONDISPLACED FRACTURE OF SURGICAL NECK OF LEFT HUMERUS WITH ROUTINE HEALING, SUBSEQUENT ENCOUNTER: ICD-10-CM

## 2025-08-26 DIAGNOSIS — F32.5 MAJOR DEPRESSIVE DISORDER IN REMISSION, UNSPECIFIED WHETHER RECURRENT: ICD-10-CM

## 2025-08-26 DIAGNOSIS — N40.1 BENIGN PROSTATIC HYPERPLASIA WITH URINARY RETENTION: ICD-10-CM

## 2025-08-26 PROCEDURE — G2211 COMPLEX E/M VISIT ADD ON: HCPCS | Performed by: INTERNAL MEDICINE

## 2025-08-26 PROCEDURE — 3079F DIAST BP 80-89 MM HG: CPT | Performed by: INTERNAL MEDICINE

## 2025-08-26 PROCEDURE — 1126F AMNT PAIN NOTED NONE PRSNT: CPT | Performed by: INTERNAL MEDICINE

## 2025-08-26 PROCEDURE — 99214 OFFICE O/P EST MOD 30 MIN: CPT | Performed by: INTERNAL MEDICINE

## 2025-08-26 PROCEDURE — 1159F MED LIST DOCD IN RCRD: CPT | Performed by: INTERNAL MEDICINE

## 2025-08-26 PROCEDURE — 3077F SYST BP >= 140 MM HG: CPT | Performed by: INTERNAL MEDICINE

## 2025-08-26 ASSESSMENT — PATIENT HEALTH QUESTIONNAIRE - PHQ9
SUM OF ALL RESPONSES TO PHQ9 QUESTIONS 1 AND 2: 0
1. LITTLE INTEREST OR PLEASURE IN DOING THINGS: NOT AT ALL
2. FEELING DOWN, DEPRESSED OR HOPELESS: NOT AT ALL

## 2025-08-26 ASSESSMENT — MINI MENTAL STATE EXAM
SHOW: PENCIL [OBJECT] ASK: WHAT IS THIS CALLED?: 2 CORRECT
RECALL THE 3 OBJECTS FROM ABOVE (APPLE, TABLE, PENNY) OR (BALL, TREE, FLAG): 0 CORRECT / UNABLE TO SCORE
PLACE DESIGN, ERASER AND PENCIL IN FRONT OF THE PERSON.  SAY:  COPY THIS DESIGN PLEASE.  SHOW: DESIGN. ALLOW: MULTIPLE TRIES. WAIT UNTIL PERSON IS FINISHED AND HANDS IT BACK. SCORE: ONLY FOR DIAGRAM WITH 4-SIDED FIGURE BETWEEN TWO 5-SIDED FIGURES: 1 CORRECT
SUM ALL MMSE QUESTIONS FOR TOTAL SCORE [OUT OF 30].: 20
SPELL THE WORD WORLD FORWARD AND BACKWARDS OR SERIAL 7S: 5 CORRECT
HAND THE PERSON A PENCIL AND PAPER. SAY:  WRITE ANY COMPLETE SENTENCE ON THAT PIECE OF PAPER. (NOTE: THE SENTENCE MUST MAKE SENSE.  IGNORE SPELLING ERRORS): 1 CORRECT
NAME OR REPEAT 3 OBJECTS - (APPLE, TABLE, PENNY) OR (BALL, TREE, FLAG): 3 CORRECT
PLEASE COPY THIS PICTURE (NOTE ALL 10 ANGLES MUST BE PRESENT AND TWO MUST INTERSECT): 0 CORRECT
WHAT IS THE YEAR, SEASON, DATE, DAY, AND MONTH: 1 CORRECT
SAY:  READ THE WORDS ON THE PAGE AND THEN DO WHAT IT SAYS.  THEN HAND THE PERSON THE SHEET WITH CLOSE YOUR EYES ON IT.  IF THE SUBJECT READS AND DOES NOT CLOSE THEIR EYES, REPEAT UP TO THREE TIMES.  SCORE ONLY IF SUBJECT CLOSES EYES.: 3 CORRECT
SAY: I WOULD LIKE YOU TO REPEAT THIS PHRASE AFTER ME: NO IFS, ANDS, OR BUTS.: 1 CORRECT
WHAT STATE, COUNTRY, CITY, HOSPITAL, FLOOR: 3 CORRECT

## 2025-08-26 ASSESSMENT — ENCOUNTER SYMPTOMS
LOSS OF SENSATION IN FEET: 0
OCCASIONAL FEELINGS OF UNSTEADINESS: 0

## 2025-08-26 ASSESSMENT — CLOCK DRAWING TEST (CDT)
QUADRANTS IN THE CORRECT LOCATION: SUCCESSFUL
DIVIDED: SUCCESSFUL
TWO HANDS EXIST ON THE CLOCK: SUCCESSFUL
NUMBERS IN THE CORRECT LOCATION: SUCCESSFUL
TOTAL SCORE: 4
CORRECT TIME WAS DRAWN: UNSUCCESSFUL

## 2025-08-26 ASSESSMENT — PAIN SCALES - GENERAL: PAINLEVEL_OUTOF10: 0-NO PAIN

## 2025-08-27 PROBLEM — L89.312 PRESSURE INJURY OF RIGHT BUTTOCK, STAGE 2 (MULTI): Status: RESOLVED | Noted: 2024-11-19 | Resolved: 2025-08-27

## 2025-08-27 PROBLEM — L89.322 PRESSURE INJURY OF LEFT BUTTOCK, STAGE 2 (MULTI): Status: RESOLVED | Noted: 2024-11-19 | Resolved: 2025-08-27

## 2025-08-27 PROBLEM — L89.300 PRESSURE INJURY OF BUTTOCK, UNSTAGEABLE (MULTI): Status: RESOLVED | Noted: 2024-09-27 | Resolved: 2025-08-27

## (undated) DEVICE — PREP, IODOPHOR, W/ALCOHOL, DURAPREP, W/APPLICATOR, 26 CC

## (undated) DEVICE — COVER, C-ARM W/CLIPS, OEC GE

## (undated) DEVICE — COVER, BACK TABLE, 65 X 90, HVY REINFORCED

## (undated) DEVICE — Device

## (undated) DEVICE — BANDAGE, COFLEX, 4 X 5 YDS, TAN, STERILE, LF

## (undated) DEVICE — DRAPE, INCISE, ANTIMICROBIAL, IOBAN 2, LARGE, 17 X 23 IN, DISPOSABLE, STERILE

## (undated) DEVICE — DRAPE COVER, C ARM, FLOUROSCAN IMAGING SYS

## (undated) DEVICE — COVER, CART, 45 X 27 X 48 IN, CLEAR

## (undated) DEVICE — BANDAGE, ELASTIC, SELF-CLOSE, 6 IN, HONEYCOMB, STERILE

## (undated) DEVICE — SUTURE, MONOCRYL, 2-0, 27 IN, SH/V-20 , UNDYED

## (undated) DEVICE — DRAPE, SHEET, THREE QUARTER, FAN FOLD, 57 X 77 IN

## (undated) DEVICE — DRESSING KIT, V.A.C., W/DRAPE/TUBING, MEDIUM, FOAM 5PK

## (undated) DEVICE — DRAPE, SHEET, U, W/ADHESIVE STRIP, IMPERVIOUS, 60 X 70 IN, DISPOSABLE, LF, STERILE

## (undated) DEVICE — WOUND SYSTEM, DEBRIDEMENT & CLEANING, O.R DUOPAK

## (undated) DEVICE — ELECTRODE, ELECTROSURGICAL, BLADE, STANDARD, 2.75 IN

## (undated) DEVICE — TOWEL, SURGICAL, NEURO, O/R, 16 X 26, BLUE, STERILE

## (undated) DEVICE — MANIFOLD, 4 PORT NEPTUNE STANDARD

## (undated) DEVICE — SUTURE, PDS II, 0, 18 IN, CT-1, VIOLET

## (undated) DEVICE — DRESSING, NON-ADHERENT, OIL EMULSION, CURITY, 3 X 8 IN, STERILE

## (undated) DEVICE — ELECTRODE, ELECTROSURGICAL, BLADE, INSULATED, ENT/IMA, STERILE

## (undated) DEVICE — BANDAGE, GAUZE, CONFORMING, KERLIX, 6 PLY, 4.5 IN X 4.1 YD

## (undated) DEVICE — APPLICATOR, CHLORAPREP, W/ORANGE TINT, 26ML

## (undated) DEVICE — BANDAGE, ELASTIC, MATRIX, SELF-CLOSURE, 4 IN X 5 YD, LF

## (undated) DEVICE — IRRIGATION SET, Y, LARGE BORE

## (undated) DEVICE — PREP, SKIN, DURAPREP, 6 CC

## (undated) DEVICE — STAPLER, SKIN PROXIMATE, 35 WIDE

## (undated) DEVICE — PROTECTOR, NERVE, ULNAR, PINK

## (undated) DEVICE — SPONGE, LAP, XRAY DECT, 18IN X 18IN, W/LOOP, STERILE